# Patient Record
Sex: FEMALE | Race: OTHER | HISPANIC OR LATINO | URBAN - METROPOLITAN AREA
[De-identification: names, ages, dates, MRNs, and addresses within clinical notes are randomized per-mention and may not be internally consistent; named-entity substitution may affect disease eponyms.]

---

## 2017-07-21 ENCOUNTER — EMERGENCY (EMERGENCY)
Facility: HOSPITAL | Age: 77
LOS: 0 days | Discharge: HOME | End: 2017-07-21
Admitting: INTERNAL MEDICINE

## 2017-07-21 DIAGNOSIS — N39.0 URINARY TRACT INFECTION, SITE NOT SPECIFIED: ICD-10-CM

## 2017-07-21 DIAGNOSIS — M81.0 AGE-RELATED OSTEOPOROSIS WITHOUT CURRENT PATHOLOGICAL FRACTURE: ICD-10-CM

## 2017-07-21 DIAGNOSIS — R11.0 NAUSEA: ICD-10-CM

## 2017-07-21 DIAGNOSIS — S72.009A FRACTURE OF UNSPECIFIED PART OF NECK OF UNSPECIFIED FEMUR, INITIAL ENCOUNTER FOR CLOSED FRACTURE: ICD-10-CM

## 2017-07-21 DIAGNOSIS — Z98.890 OTHER SPECIFIED POSTPROCEDURAL STATES: ICD-10-CM

## 2017-07-21 DIAGNOSIS — G20 PARKINSON'S DISEASE: ICD-10-CM

## 2017-07-21 DIAGNOSIS — J45.909 UNSPECIFIED ASTHMA, UNCOMPLICATED: ICD-10-CM

## 2017-07-21 DIAGNOSIS — H72.92 UNSPECIFIED PERFORATION OF TYMPANIC MEMBRANE, LEFT EAR: ICD-10-CM

## 2017-07-21 DIAGNOSIS — E78.5 HYPERLIPIDEMIA, UNSPECIFIED: ICD-10-CM

## 2017-07-21 DIAGNOSIS — R42 DIZZINESS AND GIDDINESS: ICD-10-CM

## 2017-07-21 DIAGNOSIS — E03.9 HYPOTHYROIDISM, UNSPECIFIED: ICD-10-CM

## 2017-07-21 DIAGNOSIS — M47.816 SPONDYLOSIS WITHOUT MYELOPATHY OR RADICULOPATHY, LUMBAR REGION: ICD-10-CM

## 2017-07-21 DIAGNOSIS — K21.9 GASTRO-ESOPHAGEAL REFLUX DISEASE WITHOUT ESOPHAGITIS: ICD-10-CM

## 2017-12-14 ENCOUNTER — INPATIENT (INPATIENT)
Facility: HOSPITAL | Age: 77
LOS: 6 days | Discharge: NURSING, REHAB OR LONG-TERM CARE | End: 2017-12-21
Attending: HOSPITALIST | Admitting: INTERNAL MEDICINE

## 2017-12-14 DIAGNOSIS — M47.816 SPONDYLOSIS WITHOUT MYELOPATHY OR RADICULOPATHY, LUMBAR REGION: ICD-10-CM

## 2017-12-14 DIAGNOSIS — G20 PARKINSON'S DISEASE: ICD-10-CM

## 2017-12-14 DIAGNOSIS — E03.9 HYPOTHYROIDISM, UNSPECIFIED: ICD-10-CM

## 2017-12-14 DIAGNOSIS — S72.009A FRACTURE OF UNSPECIFIED PART OF NECK OF UNSPECIFIED FEMUR, INITIAL ENCOUNTER FOR CLOSED FRACTURE: ICD-10-CM

## 2017-12-14 DIAGNOSIS — R11.0 NAUSEA: ICD-10-CM

## 2017-12-14 DIAGNOSIS — M81.0 AGE-RELATED OSTEOPOROSIS WITHOUT CURRENT PATHOLOGICAL FRACTURE: ICD-10-CM

## 2017-12-22 ENCOUNTER — OUTPATIENT (OUTPATIENT)
Dept: OUTPATIENT SERVICES | Facility: HOSPITAL | Age: 77
LOS: 1 days | Discharge: HOME | End: 2017-12-22

## 2017-12-22 DIAGNOSIS — Z00.00 ENCOUNTER FOR GENERAL ADULT MEDICAL EXAMINATION WITHOUT ABNORMAL FINDINGS: ICD-10-CM

## 2017-12-22 DIAGNOSIS — G20 PARKINSON'S DISEASE: ICD-10-CM

## 2017-12-22 DIAGNOSIS — R11.0 NAUSEA: ICD-10-CM

## 2017-12-22 DIAGNOSIS — S72.009A FRACTURE OF UNSPECIFIED PART OF NECK OF UNSPECIFIED FEMUR, INITIAL ENCOUNTER FOR CLOSED FRACTURE: ICD-10-CM

## 2017-12-22 DIAGNOSIS — M81.0 AGE-RELATED OSTEOPOROSIS WITHOUT CURRENT PATHOLOGICAL FRACTURE: ICD-10-CM

## 2017-12-22 DIAGNOSIS — M47.816 SPONDYLOSIS WITHOUT MYELOPATHY OR RADICULOPATHY, LUMBAR REGION: ICD-10-CM

## 2017-12-22 DIAGNOSIS — E03.9 HYPOTHYROIDISM, UNSPECIFIED: ICD-10-CM

## 2017-12-26 DIAGNOSIS — S72.001A FRACTURE OF UNSPECIFIED PART OF NECK OF RIGHT FEMUR, INITIAL ENCOUNTER FOR CLOSED FRACTURE: ICD-10-CM

## 2017-12-26 DIAGNOSIS — I21.A1 MYOCARDIAL INFARCTION TYPE 2: ICD-10-CM

## 2017-12-26 DIAGNOSIS — M62.82 RHABDOMYOLYSIS: ICD-10-CM
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
## 2017-12-26 DIAGNOSIS — R26.9 UNSPECIFIED ABNORMALITIES OF GAIT AND MOBILITY: ICD-10-CM

## 2017-12-26 DIAGNOSIS — G20 PARKINSON'S DISEASE: ICD-10-CM

## 2017-12-26 DIAGNOSIS — D62 ACUTE POSTHEMORRHAGIC ANEMIA: ICD-10-CM
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
## 2017-12-26 DIAGNOSIS — K59.03 DRUG INDUCED CONSTIPATION: ICD-10-CM

## 2017-12-26 DIAGNOSIS — E03.9 HYPOTHYROIDISM, UNSPECIFIED: ICD-10-CM

## 2017-12-26 DIAGNOSIS — T40.605A ADVERSE EFFECT OF UNSPECIFIED NARCOTICS, INITIAL ENCOUNTER: ICD-10-CM
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
## 2018-05-05 ENCOUNTER — OUTPATIENT (OUTPATIENT)
Dept: OUTPATIENT SERVICES | Facility: HOSPITAL | Age: 78
LOS: 1 days | Discharge: HOME | End: 2018-05-05

## 2018-05-05 DIAGNOSIS — S72.031A DISPLACED MIDCERVICAL FRACTURE OF RIGHT FEMUR, INITIAL ENCOUNTER FOR CLOSED FRACTURE: ICD-10-CM

## 2018-07-09 ENCOUNTER — OUTPATIENT (OUTPATIENT)
Dept: OUTPATIENT SERVICES | Facility: HOSPITAL | Age: 78
LOS: 1 days | Discharge: HOME | End: 2018-07-09

## 2018-07-09 DIAGNOSIS — M54.5 LOW BACK PAIN: ICD-10-CM

## 2018-07-13 ENCOUNTER — OUTPATIENT (OUTPATIENT)
Dept: OUTPATIENT SERVICES | Facility: HOSPITAL | Age: 78
LOS: 1 days | Discharge: HOME | End: 2018-07-13

## 2018-07-16 DIAGNOSIS — Z78.0 ASYMPTOMATIC MENOPAUSAL STATE: ICD-10-CM

## 2018-07-16 DIAGNOSIS — Z13.820 ENCOUNTER FOR SCREENING FOR OSTEOPOROSIS: ICD-10-CM

## 2018-07-16 DIAGNOSIS — M81.0 AGE-RELATED OSTEOPOROSIS WITHOUT CURRENT PATHOLOGICAL FRACTURE: ICD-10-CM

## 2018-07-16 DIAGNOSIS — Z87.310 PERSONAL HISTORY OF (HEALED) OSTEOPOROSIS FRACTURE: ICD-10-CM

## 2019-01-25 PROBLEM — Z00.00 ENCOUNTER FOR PREVENTIVE HEALTH EXAMINATION: Status: ACTIVE | Noted: 2019-01-25

## 2019-01-28 ENCOUNTER — RX RENEWAL (OUTPATIENT)
Age: 79
End: 2019-01-28

## 2019-03-09 ENCOUNTER — RECORD ABSTRACTING (OUTPATIENT)
Age: 79
End: 2019-03-09

## 2019-03-09 DIAGNOSIS — J45.909 UNSPECIFIED ASTHMA, UNCOMPLICATED: ICD-10-CM

## 2019-03-09 DIAGNOSIS — Z85.828 PERSONAL HISTORY OF OTHER MALIGNANT NEOPLASM OF SKIN: ICD-10-CM

## 2019-03-12 ENCOUNTER — APPOINTMENT (OUTPATIENT)
Dept: CARDIOLOGY | Facility: CLINIC | Age: 79
End: 2019-03-12

## 2019-03-12 ENCOUNTER — APPOINTMENT (OUTPATIENT)
Dept: CARDIOLOGY | Facility: CLINIC | Age: 79
End: 2019-03-12
Payer: MEDICARE

## 2019-03-12 VITALS
HEIGHT: 63 IN | DIASTOLIC BLOOD PRESSURE: 70 MMHG | WEIGHT: 115 LBS | BODY MASS INDEX: 20.38 KG/M2 | SYSTOLIC BLOOD PRESSURE: 125 MMHG

## 2019-03-12 PROCEDURE — 99213 OFFICE O/P EST LOW 20 MIN: CPT

## 2019-03-12 RX ORDER — FLUTICASONE PROPIONATE AND SALMETEROL 50; 250 UG/1; UG/1
250-50 POWDER RESPIRATORY (INHALATION)
Refills: 0 | Status: DISCONTINUED | COMMUNITY
End: 2019-03-12

## 2019-03-12 RX ORDER — GABAPENTIN 100 MG/1
100 CAPSULE ORAL
Refills: 0 | Status: DISCONTINUED | COMMUNITY
End: 2019-03-12

## 2019-03-12 NOTE — HISTORY OF PRESENT ILLNESS
[FreeTextEntry1] : pt is feeling better \par  ghazala sx are stable\par  no chest pain\par  no sdyspnoea\par  echo done in 12/18 showed lvef 40% and mod as asymptomatic

## 2019-03-12 NOTE — PHYSICAL EXAM
[General Appearance - Well Developed] : well developed [Normal Appearance] : normal appearance [Well Groomed] : well groomed [General Appearance - Well Nourished] : well nourished [No Deformities] : no deformities [General Appearance - In No Acute Distress] : no acute distress [Heart Rate And Rhythm] : heart rate and rhythm were normal [Heart Sounds] : normal S1 and S2 [Arterial Pulses Normal] : the arterial pulses were normal [Edema] : no peripheral edema present [Veins - Varicosity Changes] : no varicosital changes were noted in the lower extremities [FreeTextEntry1] : syst m as

## 2019-03-12 NOTE — ASSESSMENT
[FreeTextEntry1] : moderate as asymptomatic\par  no chf \par  parkisonism sx are stable \par  htn is well controlled\par  meds reviewed

## 2019-05-07 ENCOUNTER — APPOINTMENT (OUTPATIENT)
Dept: NEUROLOGY | Facility: CLINIC | Age: 79
End: 2019-05-07

## 2019-06-04 ENCOUNTER — MEDICATION RENEWAL (OUTPATIENT)
Age: 79
End: 2019-06-04

## 2019-08-13 ENCOUNTER — APPOINTMENT (OUTPATIENT)
Dept: NEUROLOGY | Facility: CLINIC | Age: 79
End: 2019-08-13
Payer: MEDICARE

## 2019-08-13 VITALS
BODY MASS INDEX: 20.2 KG/M2 | WEIGHT: 114 LBS | HEIGHT: 63 IN | OXYGEN SATURATION: 99 % | DIASTOLIC BLOOD PRESSURE: 70 MMHG | HEART RATE: 75 BPM | TEMPERATURE: 92.6 F | SYSTOLIC BLOOD PRESSURE: 120 MMHG

## 2019-08-13 PROCEDURE — 99214 OFFICE O/P EST MOD 30 MIN: CPT

## 2019-08-13 RX ORDER — ESCITALOPRAM OXALATE 20 MG/1
20 TABLET ORAL DAILY
Qty: 90 | Refills: 2 | Status: DISCONTINUED | COMMUNITY
Start: 2019-03-12 | End: 2019-08-13

## 2019-08-13 RX ORDER — ESCITALOPRAM OXALATE 20 MG/1
20 TABLET ORAL DAILY
Qty: 90 | Refills: 0 | Status: DISCONTINUED | COMMUNITY
Start: 2019-01-28 | End: 2019-08-13

## 2019-08-15 NOTE — PHYSICAL EXAM
[FreeTextEntry1] : She is happy in a good mood with the full facial expression she is not hypophonic and does not have tremor. She does have a slight increased tone mostly to the right side and then she walks her right foot turns in slightly. She is able to stand up from sitting position without using her hand. There is no dysmetria no drift and her gait is stable Romberg is negative.\par As she talks she uses accessory muscles significantly ivy if she is short of breath with long sentences.

## 2019-08-15 NOTE — HISTORY OF PRESENT ILLNESS
[FreeTextEntry1] : Janine is here for followup. She appears to be happy and content with her current status\par She now lives with her daughter and as it appears they are helping her a lot. She did not have a fall she is quite functional and her mood is good.\par She says his gait pattern is also good there memory overall is good she does not have any issues with swallowing. There is no dizzy or light headed spells.\par She completely takes care of herself. She does have a cane but is able to walk without using it.\par She denies having headache.\par She has had regular followup with her primary physician. She says she is a slightly short of breath and range of motion of the neck is also quite limited there is no significant radiating pain to the legs

## 2019-08-15 NOTE — ASSESSMENT
[FreeTextEntry1] : Janine is here for followup she is doing significantly better with a good mood and she is quite functional. We will continue her medications and I will see her in followup in 6 months

## 2019-09-03 ENCOUNTER — APPOINTMENT (OUTPATIENT)
Dept: CARDIOLOGY | Facility: CLINIC | Age: 79
End: 2019-09-03
Payer: MEDICARE

## 2019-09-03 VITALS
DIASTOLIC BLOOD PRESSURE: 70 MMHG | SYSTOLIC BLOOD PRESSURE: 115 MMHG | BODY MASS INDEX: 20.02 KG/M2 | WEIGHT: 113 LBS | HEIGHT: 63 IN

## 2019-09-03 PROCEDURE — 93000 ELECTROCARDIOGRAM COMPLETE: CPT

## 2019-09-03 PROCEDURE — 99213 OFFICE O/P EST LOW 20 MIN: CPT

## 2019-09-03 RX ORDER — NIFEDIPINE 30 MG/1
30 TABLET, FILM COATED, EXTENDED RELEASE ORAL
Refills: 0 | Status: DISCONTINUED | COMMUNITY
End: 2019-09-03

## 2019-09-03 NOTE — HISTORY OF PRESENT ILLNESS
[FreeTextEntry1] : pt is feeling well\par  no chest pains\par  meds reviewed\par  parkinson sx are stable

## 2019-09-19 ENCOUNTER — RX RENEWAL (OUTPATIENT)
Age: 79
End: 2019-09-19

## 2019-10-03 ENCOUNTER — MEDICATION RENEWAL (OUTPATIENT)
Age: 79
End: 2019-10-03

## 2019-10-07 ENCOUNTER — INPATIENT (INPATIENT)
Facility: HOSPITAL | Age: 79
LOS: 6 days | Discharge: NURSING, REHAB OR LONG-TERM CARE | End: 2019-10-14
Attending: INTERNAL MEDICINE | Admitting: INTERNAL MEDICINE
Payer: MEDICARE

## 2019-10-07 VITALS
SYSTOLIC BLOOD PRESSURE: 161 MMHG | WEIGHT: 160.06 LBS | HEART RATE: 100 BPM | TEMPERATURE: 96 F | RESPIRATION RATE: 18 BRPM | OXYGEN SATURATION: 98 % | DIASTOLIC BLOOD PRESSURE: 83 MMHG

## 2019-10-07 LAB
ALBUMIN SERPL ELPH-MCNC: 4.7 G/DL — SIGNIFICANT CHANGE UP (ref 3.5–5.2)
ALP SERPL-CCNC: 72 U/L — SIGNIFICANT CHANGE UP (ref 30–115)
ALT FLD-CCNC: 10 U/L — SIGNIFICANT CHANGE UP (ref 0–41)
ANION GAP SERPL CALC-SCNC: 11 MMOL/L — SIGNIFICANT CHANGE UP (ref 7–14)
ANION GAP SERPL CALC-SCNC: 14 MMOL/L — SIGNIFICANT CHANGE UP (ref 7–14)
APPEARANCE UR: CLEAR — SIGNIFICANT CHANGE UP
AST SERPL-CCNC: 19 U/L — SIGNIFICANT CHANGE UP (ref 0–41)
BACTERIA # UR AUTO: ABNORMAL
BASOPHILS # BLD AUTO: 0.08 K/UL — SIGNIFICANT CHANGE UP (ref 0–0.2)
BASOPHILS NFR BLD AUTO: 1 % — SIGNIFICANT CHANGE UP (ref 0–1)
BILIRUB SERPL-MCNC: 0.3 MG/DL — SIGNIFICANT CHANGE UP (ref 0.2–1.2)
BILIRUB UR-MCNC: NEGATIVE — SIGNIFICANT CHANGE UP
BUN SERPL-MCNC: 19 MG/DL — SIGNIFICANT CHANGE UP (ref 10–20)
BUN SERPL-MCNC: 21 MG/DL — HIGH (ref 10–20)
CALCIUM SERPL-MCNC: 8.8 MG/DL — SIGNIFICANT CHANGE UP (ref 8.5–10.1)
CALCIUM SERPL-MCNC: 9.7 MG/DL — SIGNIFICANT CHANGE UP (ref 8.5–10.1)
CHLORIDE SERPL-SCNC: 101 MMOL/L — SIGNIFICANT CHANGE UP (ref 98–110)
CHLORIDE SERPL-SCNC: 103 MMOL/L — SIGNIFICANT CHANGE UP (ref 98–110)
CO2 SERPL-SCNC: 26 MMOL/L — SIGNIFICANT CHANGE UP (ref 17–32)
CO2 SERPL-SCNC: 26 MMOL/L — SIGNIFICANT CHANGE UP (ref 17–32)
COLOR SPEC: SIGNIFICANT CHANGE UP
CREAT SERPL-MCNC: 0.9 MG/DL — SIGNIFICANT CHANGE UP (ref 0.7–1.5)
CREAT SERPL-MCNC: 1.3 MG/DL — SIGNIFICANT CHANGE UP (ref 0.7–1.5)
DIFF PNL FLD: ABNORMAL
EOSINOPHIL # BLD AUTO: 0.21 K/UL — SIGNIFICANT CHANGE UP (ref 0–0.7)
EOSINOPHIL NFR BLD AUTO: 2.5 % — SIGNIFICANT CHANGE UP (ref 0–8)
EPI CELLS # UR: 2 /HPF — SIGNIFICANT CHANGE UP (ref 0–5)
GLUCOSE SERPL-MCNC: 101 MG/DL — HIGH (ref 70–99)
GLUCOSE SERPL-MCNC: 116 MG/DL — HIGH (ref 70–99)
GLUCOSE UR QL: NEGATIVE — SIGNIFICANT CHANGE UP
HCT VFR BLD CALC: 44.8 % — SIGNIFICANT CHANGE UP (ref 37–47)
HGB BLD-MCNC: 14.4 G/DL — SIGNIFICANT CHANGE UP (ref 12–16)
HYALINE CASTS # UR AUTO: 9 /LPF — HIGH (ref 0–7)
IMM GRANULOCYTES NFR BLD AUTO: 0.8 % — HIGH (ref 0.1–0.3)
KETONES UR-MCNC: SIGNIFICANT CHANGE UP
LEUKOCYTE ESTERASE UR-ACNC: NEGATIVE — SIGNIFICANT CHANGE UP
LYMPHOCYTES # BLD AUTO: 3.08 K/UL — SIGNIFICANT CHANGE UP (ref 1.2–3.4)
LYMPHOCYTES # BLD AUTO: 36.7 % — SIGNIFICANT CHANGE UP (ref 20.5–51.1)
MCHC RBC-ENTMCNC: 31 PG — SIGNIFICANT CHANGE UP (ref 27–31)
MCHC RBC-ENTMCNC: 32.1 G/DL — SIGNIFICANT CHANGE UP (ref 32–37)
MCV RBC AUTO: 96.3 FL — SIGNIFICANT CHANGE UP (ref 81–99)
MONOCYTES # BLD AUTO: 0.73 K/UL — HIGH (ref 0.1–0.6)
MONOCYTES NFR BLD AUTO: 8.7 % — SIGNIFICANT CHANGE UP (ref 1.7–9.3)
NEUTROPHILS # BLD AUTO: 4.23 K/UL — SIGNIFICANT CHANGE UP (ref 1.4–6.5)
NEUTROPHILS NFR BLD AUTO: 50.3 % — SIGNIFICANT CHANGE UP (ref 42.2–75.2)
NITRITE UR-MCNC: POSITIVE
NRBC # BLD: 0 /100 WBCS — SIGNIFICANT CHANGE UP (ref 0–0)
PH UR: 7.5 — SIGNIFICANT CHANGE UP (ref 5–8)
PLATELET # BLD AUTO: 204 K/UL — SIGNIFICANT CHANGE UP (ref 130–400)
POTASSIUM SERPL-MCNC: 4.4 MMOL/L — SIGNIFICANT CHANGE UP (ref 3.5–5)
POTASSIUM SERPL-MCNC: 4.4 MMOL/L — SIGNIFICANT CHANGE UP (ref 3.5–5)
POTASSIUM SERPL-SCNC: 4.4 MMOL/L — SIGNIFICANT CHANGE UP (ref 3.5–5)
POTASSIUM SERPL-SCNC: 4.4 MMOL/L — SIGNIFICANT CHANGE UP (ref 3.5–5)
PROT SERPL-MCNC: 7.1 G/DL — SIGNIFICANT CHANGE UP (ref 6–8)
PROT UR-MCNC: SIGNIFICANT CHANGE UP
RBC # BLD: 4.65 M/UL — SIGNIFICANT CHANGE UP (ref 4.2–5.4)
RBC # FLD: 12.8 % — SIGNIFICANT CHANGE UP (ref 11.5–14.5)
RBC CASTS # UR COMP ASSIST: 4 /HPF — SIGNIFICANT CHANGE UP (ref 0–4)
SODIUM SERPL-SCNC: 140 MMOL/L — SIGNIFICANT CHANGE UP (ref 135–146)
SODIUM SERPL-SCNC: 141 MMOL/L — SIGNIFICANT CHANGE UP (ref 135–146)
SP GR SPEC: >1.05 (ref 1.01–1.02)
TROPONIN T SERPL-MCNC: <0.01 NG/ML — SIGNIFICANT CHANGE UP
TROPONIN T SERPL-MCNC: <0.01 NG/ML — SIGNIFICANT CHANGE UP
UROBILINOGEN FLD QL: SIGNIFICANT CHANGE UP
WBC # BLD: 8.4 K/UL — SIGNIFICANT CHANGE UP (ref 4.8–10.8)
WBC # FLD AUTO: 8.4 K/UL — SIGNIFICANT CHANGE UP (ref 4.8–10.8)
WBC UR QL: 2 /HPF — SIGNIFICANT CHANGE UP (ref 0–5)

## 2019-10-07 PROCEDURE — 71260 CT THORAX DX C+: CPT | Mod: 26

## 2019-10-07 PROCEDURE — 71045 X-RAY EXAM CHEST 1 VIEW: CPT | Mod: 26

## 2019-10-07 PROCEDURE — 73080 X-RAY EXAM OF ELBOW: CPT | Mod: 26,LT

## 2019-10-07 PROCEDURE — 73060 X-RAY EXAM OF HUMERUS: CPT | Mod: 26,LT

## 2019-10-07 PROCEDURE — 72125 CT NECK SPINE W/O DYE: CPT | Mod: 26

## 2019-10-07 PROCEDURE — 74177 CT ABD & PELVIS W/CONTRAST: CPT | Mod: 26

## 2019-10-07 PROCEDURE — 72170 X-RAY EXAM OF PELVIS: CPT | Mod: 26

## 2019-10-07 PROCEDURE — 99223 1ST HOSP IP/OBS HIGH 75: CPT

## 2019-10-07 PROCEDURE — 99291 CRITICAL CARE FIRST HOUR: CPT

## 2019-10-07 PROCEDURE — 70450 CT HEAD/BRAIN W/O DYE: CPT | Mod: 26

## 2019-10-07 PROCEDURE — 73030 X-RAY EXAM OF SHOULDER: CPT | Mod: 26,LT

## 2019-10-07 PROCEDURE — 73000 X-RAY EXAM OF COLLAR BONE: CPT | Mod: 26,LT

## 2019-10-07 RX ORDER — CARBIDOPA AND LEVODOPA 25; 100 MG/1; MG/1
1 TABLET ORAL
Refills: 0 | Status: DISCONTINUED | OUTPATIENT
Start: 2019-10-07 | End: 2019-10-14

## 2019-10-07 RX ORDER — ACETAMINOPHEN 500 MG
650 TABLET ORAL EVERY 6 HOURS
Refills: 0 | Status: COMPLETED | OUTPATIENT
Start: 2019-10-07 | End: 2019-10-10

## 2019-10-07 RX ORDER — DONEPEZIL HYDROCHLORIDE 10 MG/1
10 TABLET, FILM COATED ORAL AT BEDTIME
Refills: 0 | Status: DISCONTINUED | OUTPATIENT
Start: 2019-10-07 | End: 2019-10-07

## 2019-10-07 RX ORDER — LEVOTHYROXINE SODIUM 125 MCG
88 TABLET ORAL DAILY
Refills: 0 | Status: DISCONTINUED | OUTPATIENT
Start: 2019-10-07 | End: 2019-10-14

## 2019-10-07 RX ORDER — SENNA PLUS 8.6 MG/1
2 TABLET ORAL AT BEDTIME
Refills: 0 | Status: DISCONTINUED | OUTPATIENT
Start: 2019-10-07 | End: 2019-10-14

## 2019-10-07 RX ORDER — CHLORHEXIDINE GLUCONATE 213 G/1000ML
1 SOLUTION TOPICAL EVERY 12 HOURS
Refills: 0 | Status: COMPLETED | OUTPATIENT
Start: 2019-10-07 | End: 2019-10-08

## 2019-10-07 RX ORDER — IBUPROFEN 200 MG
600 TABLET ORAL EVERY 6 HOURS
Refills: 0 | Status: DISCONTINUED | OUTPATIENT
Start: 2019-10-07 | End: 2019-10-07

## 2019-10-07 RX ORDER — KETOROLAC TROMETHAMINE 30 MG/ML
15 SYRINGE (ML) INJECTION ONCE
Refills: 0 | Status: DISCONTINUED | OUTPATIENT
Start: 2019-10-07 | End: 2019-10-07

## 2019-10-07 RX ORDER — TETANUS TOXOID, REDUCED DIPHTHERIA TOXOID AND ACELLULAR PERTUSSIS VACCINE, ADSORBED 5; 2.5; 8; 8; 2.5 [IU]/.5ML; [IU]/.5ML; UG/.5ML; UG/.5ML; UG/.5ML
0.5 SUSPENSION INTRAMUSCULAR ONCE
Refills: 0 | Status: COMPLETED | OUTPATIENT
Start: 2019-10-07 | End: 2019-10-07

## 2019-10-07 RX ORDER — MORPHINE SULFATE 50 MG/1
2 CAPSULE, EXTENDED RELEASE ORAL ONCE
Refills: 0 | Status: DISCONTINUED | OUTPATIENT
Start: 2019-10-07 | End: 2019-10-07

## 2019-10-07 RX ORDER — SIMVASTATIN 20 MG/1
20 TABLET, FILM COATED ORAL AT BEDTIME
Refills: 0 | Status: DISCONTINUED | OUTPATIENT
Start: 2019-10-07 | End: 2019-10-07

## 2019-10-07 RX ORDER — ESCITALOPRAM OXALATE 10 MG/1
20 TABLET, FILM COATED ORAL DAILY
Refills: 0 | Status: DISCONTINUED | OUTPATIENT
Start: 2019-10-07 | End: 2019-10-14

## 2019-10-07 RX ORDER — PANTOPRAZOLE SODIUM 20 MG/1
40 TABLET, DELAYED RELEASE ORAL
Refills: 0 | Status: DISCONTINUED | OUTPATIENT
Start: 2019-10-07 | End: 2019-10-14

## 2019-10-07 RX ORDER — DONEPEZIL HYDROCHLORIDE 10 MG/1
10 TABLET, FILM COATED ORAL AT BEDTIME
Refills: 0 | Status: DISCONTINUED | OUTPATIENT
Start: 2019-10-07 | End: 2019-10-14

## 2019-10-07 RX ORDER — CARBIDOPA AND LEVODOPA 25; 100 MG/1; MG/1
1 TABLET ORAL THREE TIMES A DAY
Refills: 0 | Status: DISCONTINUED | OUTPATIENT
Start: 2019-10-07 | End: 2019-10-07

## 2019-10-07 RX ORDER — SIMVASTATIN 20 MG/1
20 TABLET, FILM COATED ORAL AT BEDTIME
Refills: 0 | Status: DISCONTINUED | OUTPATIENT
Start: 2019-10-07 | End: 2019-10-14

## 2019-10-07 RX ORDER — DOCUSATE SODIUM 100 MG
100 CAPSULE ORAL DAILY
Refills: 0 | Status: DISCONTINUED | OUTPATIENT
Start: 2019-10-07 | End: 2019-10-14

## 2019-10-07 RX ORDER — SODIUM CHLORIDE 9 MG/ML
1000 INJECTION, SOLUTION INTRAVENOUS
Refills: 0 | Status: DISCONTINUED | OUTPATIENT
Start: 2019-10-07 | End: 2019-10-08

## 2019-10-07 RX ORDER — OXYCODONE HYDROCHLORIDE 5 MG/1
5 TABLET ORAL EVERY 6 HOURS
Refills: 0 | Status: DISCONTINUED | OUTPATIENT
Start: 2019-10-07 | End: 2019-10-14

## 2019-10-07 RX ORDER — CEFTRIAXONE 500 MG/1
1000 INJECTION, POWDER, FOR SOLUTION INTRAMUSCULAR; INTRAVENOUS ONCE
Refills: 0 | Status: COMPLETED | OUTPATIENT
Start: 2019-10-07 | End: 2019-10-07

## 2019-10-07 RX ORDER — INFLUENZA VIRUS VACCINE 15; 15; 15; 15 UG/.5ML; UG/.5ML; UG/.5ML; UG/.5ML
0.5 SUSPENSION INTRAMUSCULAR ONCE
Refills: 0 | Status: COMPLETED | OUTPATIENT
Start: 2019-10-07 | End: 2019-10-12

## 2019-10-07 RX ORDER — ACETAMINOPHEN 500 MG
650 TABLET ORAL EVERY 6 HOURS
Refills: 0 | Status: DISCONTINUED | OUTPATIENT
Start: 2019-10-07 | End: 2019-10-07

## 2019-10-07 RX ORDER — HEPARIN SODIUM 5000 [USP'U]/ML
5000 INJECTION INTRAVENOUS; SUBCUTANEOUS EVERY 8 HOURS
Refills: 0 | Status: DISCONTINUED | OUTPATIENT
Start: 2019-10-07 | End: 2019-10-14

## 2019-10-07 RX ORDER — TETANUS TOXOID, REDUCED DIPHTHERIA TOXOID AND ACELLULAR PERTUSSIS VACCINE, ADSORBED 5; 2.5; 8; 8; 2.5 [IU]/.5ML; [IU]/.5ML; UG/.5ML; UG/.5ML; UG/.5ML
0.5 SUSPENSION INTRAMUSCULAR ONCE
Refills: 0 | Status: DISCONTINUED | OUTPATIENT
Start: 2019-10-07 | End: 2019-10-07

## 2019-10-07 RX ADMIN — Medication 650 MILLIGRAM(S): at 12:50

## 2019-10-07 RX ADMIN — CARBIDOPA AND LEVODOPA 1 TABLET(S): 25; 100 TABLET ORAL at 13:50

## 2019-10-07 RX ADMIN — MORPHINE SULFATE 2 MILLIGRAM(S): 50 CAPSULE, EXTENDED RELEASE ORAL at 01:55

## 2019-10-07 RX ADMIN — SENNA PLUS 2 TABLET(S): 8.6 TABLET ORAL at 21:38

## 2019-10-07 RX ADMIN — Medication 15 MILLIGRAM(S): at 03:29

## 2019-10-07 RX ADMIN — Medication 650 MILLIGRAM(S): at 17:21

## 2019-10-07 RX ADMIN — Medication 88 MICROGRAM(S): at 06:26

## 2019-10-07 RX ADMIN — DONEPEZIL HYDROCHLORIDE 10 MILLIGRAM(S): 10 TABLET, FILM COATED ORAL at 21:38

## 2019-10-07 RX ADMIN — OXYCODONE HYDROCHLORIDE 5 MILLIGRAM(S): 5 TABLET ORAL at 21:37

## 2019-10-07 RX ADMIN — OXYCODONE HYDROCHLORIDE 5 MILLIGRAM(S): 5 TABLET ORAL at 22:00

## 2019-10-07 RX ADMIN — MORPHINE SULFATE 2 MILLIGRAM(S): 50 CAPSULE, EXTENDED RELEASE ORAL at 04:29

## 2019-10-07 RX ADMIN — Medication 650 MILLIGRAM(S): at 07:02

## 2019-10-07 RX ADMIN — MORPHINE SULFATE 2 MILLIGRAM(S): 50 CAPSULE, EXTENDED RELEASE ORAL at 01:15

## 2019-10-07 RX ADMIN — HEPARIN SODIUM 5000 UNIT(S): 5000 INJECTION INTRAVENOUS; SUBCUTANEOUS at 06:26

## 2019-10-07 RX ADMIN — MORPHINE SULFATE 2 MILLIGRAM(S): 50 CAPSULE, EXTENDED RELEASE ORAL at 01:20

## 2019-10-07 RX ADMIN — CARBIDOPA AND LEVODOPA 1 TABLET(S): 25; 100 TABLET ORAL at 21:38

## 2019-10-07 RX ADMIN — SIMVASTATIN 20 MILLIGRAM(S): 20 TABLET, FILM COATED ORAL at 21:38

## 2019-10-07 RX ADMIN — CARBIDOPA AND LEVODOPA 1 TABLET(S): 25; 100 TABLET ORAL at 17:22

## 2019-10-07 RX ADMIN — MORPHINE SULFATE 2 MILLIGRAM(S): 50 CAPSULE, EXTENDED RELEASE ORAL at 01:04

## 2019-10-07 RX ADMIN — MORPHINE SULFATE 2 MILLIGRAM(S): 50 CAPSULE, EXTENDED RELEASE ORAL at 01:47

## 2019-10-07 RX ADMIN — CEFTRIAXONE 100 MILLIGRAM(S): 500 INJECTION, POWDER, FOR SOLUTION INTRAMUSCULAR; INTRAVENOUS at 04:37

## 2019-10-07 RX ADMIN — CEFTRIAXONE 1000 MILLIGRAM(S): 500 INJECTION, POWDER, FOR SOLUTION INTRAMUSCULAR; INTRAVENOUS at 05:09

## 2019-10-07 RX ADMIN — Medication 600 MILLIGRAM(S): at 07:02

## 2019-10-07 RX ADMIN — Medication 100 MILLIGRAM(S): at 12:20

## 2019-10-07 RX ADMIN — TETANUS TOXOID, REDUCED DIPHTHERIA TOXOID AND ACELLULAR PERTUSSIS VACCINE, ADSORBED 0.5 MILLILITER(S): 5; 2.5; 8; 8; 2.5 SUSPENSION INTRAMUSCULAR at 01:19

## 2019-10-07 RX ADMIN — SODIUM CHLORIDE 90 MILLILITER(S): 9 INJECTION, SOLUTION INTRAVENOUS at 14:01

## 2019-10-07 RX ADMIN — Medication 650 MILLIGRAM(S): at 12:20

## 2019-10-07 RX ADMIN — HEPARIN SODIUM 5000 UNIT(S): 5000 INJECTION INTRAVENOUS; SUBCUTANEOUS at 14:01

## 2019-10-07 RX ADMIN — HEPARIN SODIUM 5000 UNIT(S): 5000 INJECTION INTRAVENOUS; SUBCUTANEOUS at 21:38

## 2019-10-07 RX ADMIN — Medication 650 MILLIGRAM(S): at 17:53

## 2019-10-07 RX ADMIN — MORPHINE SULFATE 2 MILLIGRAM(S): 50 CAPSULE, EXTENDED RELEASE ORAL at 01:00

## 2019-10-07 RX ADMIN — SODIUM CHLORIDE 90 MILLILITER(S): 9 INJECTION, SOLUTION INTRAVENOUS at 06:26

## 2019-10-07 RX ADMIN — Medication 15 MILLIGRAM(S): at 03:24

## 2019-10-07 RX ADMIN — MORPHINE SULFATE 2 MILLIGRAM(S): 50 CAPSULE, EXTENDED RELEASE ORAL at 04:01

## 2019-10-07 RX ADMIN — Medication 600 MILLIGRAM(S): at 06:25

## 2019-10-07 RX ADMIN — Medication 650 MILLIGRAM(S): at 06:26

## 2019-10-07 RX ADMIN — CARBIDOPA AND LEVODOPA 1 TABLET(S): 25; 100 TABLET ORAL at 06:47

## 2019-10-07 RX ADMIN — PANTOPRAZOLE SODIUM 40 MILLIGRAM(S): 20 TABLET, DELAYED RELEASE ORAL at 06:26

## 2019-10-07 RX ADMIN — ESCITALOPRAM OXALATE 20 MILLIGRAM(S): 10 TABLET, FILM COATED ORAL at 12:20

## 2019-10-07 NOTE — H&P ADULT - NSHPPHYSICALEXAM_GEN_ALL_CORE
PHYSICAL EXAM:  GENERAL: A&O, NAD, GCS 15  HEENT: Normocephalic, atraumatic  BACK: No stepoffs, No tenderness   CHEST/LUNG: Bilateral breath sounds  HEART: Regular rate and rhythm  ABDOMEN: Soft, Nondistended, Nontender, Pelvis stable  EXTREMITIES:  Moving all extremities, pulses throughout, + obvious deformity over L clavicle area with associated tenderness.   RECTAL: not performed

## 2019-10-07 NOTE — ED PROVIDER NOTE - NS ED ROS FT
Constitutional: See HPI.  Eyes: No visual changes, eye pain or discharge.  ENMT: No hearing changes, pain, discharge or infections. No neck pain or stiffness.  Cardiac: No chest pain, SOB or edema. No chest pain with exertion.  Respiratory: No cough or respiratory distress.   GI: No nausea, vomiting, diarrhea or abdominal pain.  : No dysuria, frequency or burning.  MS: +left shoulder pain; No myalgia, muscle weakness, joint pain or back pain.  Neuro: No headache or weakness. No LOC.  Skin: No skin rash.  Endo: No hx of DM, thyroid disease  Except as documented in HPI, all other review of systems is negative

## 2019-10-07 NOTE — ED ADULT NURSE NOTE - NSIMPLEMENTINTERV_GEN_ALL_ED
Implemented All Fall with Harm Risk Interventions:  [place] to call system. Call bell, personal items and telephone within reach. Instruct patient to call for assistance. Room bathroom lighting operational. Non-slip footwear when patient is off stretcher. Physically safe environment: no spills, clutter or unnecessary equipment. Stretcher in lowest position, wheels locked, appropriate side rails in place. Provide visual cue, wrist band, yellow gown, etc. Monitor gait and stability. Monitor for mental status changes and reorient to person, place, and time. Review medications for side effects contributing to fall risk. Reinforce activity limits and safety measures with patient and family. Provide visual clues: red socks.

## 2019-10-07 NOTE — H&P ADULT - NSHPLABSRESULTS_GEN_ALL_CORE
14.4   8.40  )-----------( 204      ( 10-07 @ 00:46 )             44.8                    141   |  101   |  19                 Ca: 9.7    BMP:   ----------------------------< 116    Mg: x     (10-07-19 @ 00:46)             4.4    |  26    | 1.3                Ph: x        LFT:     TPro: 7.1 / Alb: 4.7 / TBili: 0.3 / DBili: x / AST: 19 / ALT: 10 / AlkPhos: 72   (10-07-19 @ 00:46)              CARDIAC MARKERS ( 07 Oct 2019 00:46 )  x     / <0.01 ng/mL / x     / x     / x            Urinalysis Basic - ( 07 Oct 2019 03:50 )    Color: Light Yellow / Appearance: Clear / SG: >1.050 / pH: x  Gluc: x / Ketone: Trace  / Bili: Negative / Urobili: <2 mg/dL   Blood: x / Protein: Trace / Nitrite: Positive   Leuk Esterase: Negative / RBC: 4 /HPF / WBC 2 /HPF   Sq Epi: x / Non Sq Epi: 2 /HPF / Bacteria: Many        RADIOLOGY:   < from: CT Head No Cont (10.07.19 @ 02:04) >  IMPRESSION:  No CT evidence of acute intracranial pathology.      < from: CT Cervical Spine No Cont (10.07.19 @ 02:06) >  IMPRESSION:  No acute cervical spine fracture or subluxation.  Acute left clavicular fracture.See CT chest, abdomen and pelvis report   from the same day.  < end of copied text >      < from: CT Chest w/ IV Cont (10.07.19 @ 02:09) >  IMPRESSION:   Acute, displaced midshaft fracture of the left clavicle.  Ascending thoracic aorta aneurysm measuring 4.1 cm.  No CT evidence of acute traumatic intra-abdominal injury.  Additional Findings/Recommendations After Attending Radiologist Review:    Acute fractures of the left third, fourth, fifth, sixth, and seventh   ribs. The sixth rib is fractured in 2 places, flail segment. The third   rib fracture is displaced.  < end of copied text >      < from: CT Abdomen and Pelvis w/ IV Cont (10.07.19 @ 02:09) >  IMPRESSION:   Acute, displaced midshaft fracture of the left clavicle.    Ascending thoracic aorta aneurysm measuring 4.1 cm.    No CT evidence of acute traumatic intra-abdominal injury.    Additional Findings/Recommendations After Attending Radiologist Review:    Acute fractures of the left third, fourth, fifth, sixth, and seventh   ribs. The sixth rib is fractured in 2 places, flail segment. The third   rib fracture is displaced.  < end of copied text >      < from: Xray Pelvis AP only (10.07.19 @ 01:56) >  IMPRESSION:  No acute displaced fracture.  < end of copied text > 14.4   8.40  )-----------( 204      ( 10-07 @ 00:46 )             44.8                    141   |  101   |  19                 Ca: 9.7    BMP:   ----------------------------< 116    Mg: x     (10-07-19 @ 00:46)             4.4    |  26    | 1.3                Ph: x        LFT:     TPro: 7.1 / Alb: 4.7 / TBili: 0.3 / DBili: x / AST: 19 / ALT: 10 / AlkPhos: 72   (10-07-19 @ 00:46)    CARDIAC MARKERS ( 07 Oct 2019 00:46 )  x     / <0.01 ng/mL / x     / x     / x        Urinalysis Basic - ( 07 Oct 2019 03:50 )    Color: Light Yellow / Appearance: Clear / SG: >1.050 / pH: x  Gluc: x / Ketone: Trace  / Bili: Negative / Urobili: <2 mg/dL   Blood: x / Protein: Trace / Nitrite: Positive   Leuk Esterase: Negative / RBC: 4 /HPF / WBC 2 /HPF   Sq Epi: x / Non Sq Epi: 2 /HPF / Bacteria: Many    RADIOLOGY:   < from: CT Head No Cont (10.07.19 @ 02:04) >  IMPRESSION:  No CT evidence of acute intracranial pathology.    < from: CT Cervical Spine No Cont (10.07.19 @ 02:06) >  IMPRESSION:  No acute cervical spine fracture or subluxation.  Acute left clavicular fracture.See CT chest, abdomen and pelvis report   from the same day.  < end of copied text >    < from: CT Chest w/ IV Cont (10.07.19 @ 02:09) >  IMPRESSION:   Acute, displaced midshaft fracture of the left clavicle.  Ascending thoracic aorta aneurysm measuring 4.1 cm.  No CT evidence of acute traumatic intra-abdominal injury.  Additional Findings/Recommendations After Attending Radiologist Review:    Acute fractures of the left third, fourth, fifth, sixth, and seventh   ribs. The sixth rib is fractured in 2 places, flail segment. The third   rib fracture is displaced.  < end of copied text >    < from: CT Abdomen and Pelvis w/ IV Cont (10.07.19 @ 02:09) >  IMPRESSION:   Acute, displaced midshaft fracture of the left clavicle.  Ascending thoracic aorta aneurysm measuring 4.1 cm.  No CT evidence of acute traumatic intra-abdominal injury.  Additional Findings/Recommendations After Attending Radiologist Review:    Acute fractures of the left third, fourth, fifth, sixth, and seventh   ribs. The sixth rib is fractured in 2 places, flail segment. The third   rib fracture is displaced.  < end of copied text >    < from: Xray Pelvis AP only (10.07.19 @ 01:56) >  IMPRESSION:  No acute displaced fracture.  < end of copied text >

## 2019-10-07 NOTE — ED PROVIDER NOTE - CRITICAL CARE PROVIDED
consultation with other physicians/documentation/consult w/ pt's family directly relating to pts condition/additional history taking/interpretation of diagnostic studies

## 2019-10-07 NOTE — ED PROVIDER NOTE - CLINICAL SUMMARY MEDICAL DECISION MAKING FREE TEXT BOX
Pt s/p fall, now with multiple rib fractures and clavicle fracture. Will admit to SICU for pain control.

## 2019-10-07 NOTE — CONSULT NOTE ADULT - SUBJECTIVE AND OBJECTIVE BOX
SICU Consultation Note  =====================================================  HPI:    78 y/o F PMHx Parkinsons and Hypothyroidism presents to the ED s/p mechanical fall unwitnessed. As per family, they heard the patient fall at 11:30 PM and patient was found down lying on her side. +HT, ?LOC, -AC. Patient has had multiple falls as per family 2/2 to parkinsons. Patient does not recall how she fall. Patient also presented with L shoulder pain. PAN Scan performed all negative except L sided 3-7 rib fx . Sixth rib is broken in two places, flail segment. 3rd rib is displaced.  IS pulling >1500, satting well on NC. SICU consulted for respiratory monitoring and pain control. Patient hemodynamically stable.     CT Chest w/ IV Contrast   IMPRESSION:   Acute, displaced midshaft fracture of the left clavicle.  Ascending thoracic aorta aneurysm measuring 4.1 cm.  No CT evidence of acute traumatic intra-abdominal injury.  Additional Findings/Recommendations After Attending Radiologist Review:    Acute fractures of the left third, fourth, fifth, sixth, and seventh   ribs. The sixth rib is fractured in 2 places, flail segment. The third   rib fracture is displaced.        PAST MEDICAL & SURGICAL HISTORY:  Hypothyroid  Parkinsonian syndrome      Allergies  No Known Allergies    SH:  Home Medications:  CARB/LEVO    TAB 25-100MG:  (07 Oct 2019 04:45)  CARB/LEVO ER TAB 50-200MG:  (07 Oct 2019 04:45)  DONEPEZIL    TAB 10MG:  (07 Oct 2019 04:45)  ESCITALOPRAM TAB 20MG:  (07 Oct 2019 04:45)  LEVOTHYROXIN TAB 88MCG:  (07 Oct 2019 04:45)  SIMVASTATIN  TAB 20MG:  (07 Oct 2019 04:45)    Advanced Directives: Full Code     ROS:  [x ] A ten-point review of systems was otherwise negative except as noted.  [ ] Due to altered mental status/intubation, subjective information were not able to be obtained from the patient. History was obtained, to the extent possible, from review of the chart and collateral sources of information.      CURRENT MEDICATIONS:   --------------------------------------------------------------------------------------  Neurologic Medications  acetaminophen   Tablet .. 650 milliGRAM(s) Oral every 6 hours  carbidopa/levodopa  25/100 1 Tablet(s) Oral three times a day  carbidopa/levodopa CR 50/200 1 Tablet(s) Oral <User Schedule>  donepezil 10 milliGRAM(s) Oral at bedtime  escitalopram 20 milliGRAM(s) Oral daily  ibuprofen  Tablet. 600 milliGRAM(s) Oral every 6 hours    Gastrointestinal Medications  pantoprazole    Tablet 40 milliGRAM(s) Oral before breakfast    Hematologic/Oncologic Medications  heparin  Injectable 5000 Unit(s) SubCutaneous every 8 hours    Endocrine/Metabolic Medications  levothyroxine 88 MICROGram(s) Oral daily  simvastatin 20 milliGRAM(s) Oral at bedtime    Topical/Other Medications  chlorhexidine 4% Liquid 1 Application(s) Topical every 12 hours    --------------------------------------------------------------------------------------    Vital Signs Last 24 Hrs  T(F): 96.3  HR: 85  BP: 139/71   RR: 16   SpO2: 98%       I&O's Summary      LABS:                          14.4   8.40  )-----------( 204      ( 07 Oct 2019 00:46 )             44.8     10-07    141  |  101  |  19  ----------------------------<  116<H>  4.4   |  26  |  1.3    Ca    9.7      07 Oct 2019 00:46    TPro  7.1  /  Alb  4.7  /  TBili  0.3  /  DBili  x   /  AST  19  /  ALT  10  /  AlkPhos  72  10-07    LIVER FUNCTIONS - ( 07 Oct 2019 00:46 )  Alb: 4.7 g/dL / Pro: 7.1 g/dL / ALK PHOS: 72 U/L / ALT: 10 U/L / AST: 19 U/L / GGT: x             CARDIAC MARKERS ( 07 Oct 2019 00:46 )  x     / <0.01 ng/mL / x     / x     / x            EXAM:  General/Neuro: A&Ox3. Follows commands. No neuro focal deficits.     Respiratory  Exam: Lungs clear to auscultation, Normal expansion/effort. Pulling >1500 on IS  NC satting >98%. No ecchymosis noted. Tenderness to palpation at rib fx sites     Cardiovascular  Exam: S1, S2.  Regular rate and rhythm.   Cardiac Rhythm: Normal Sinus Rhythm      GI  Exam: Abdomen soft, Non-tender, Non-distended.  Current Diet: Regular diet     Extremities  Exam: Extremities warm, pink, well-perfused.      Derm:  Exam: Good skin turgor, no skin breakdown.      :   Exam: No torres     MSK: L clavicle has visible deformity w/ point tenderness.     Tubes/Lines/Drains  ***  [x] Peripheral IV

## 2019-10-07 NOTE — CONSULT NOTE ADULT - ASSESSMENT
IMPRESSION: Rehab of gait ab fall parkinsons with decline, multitrauma    PRECAUTIONS: [x    ] Cardiac  [  x  ] Respiratory  [    ] Seizures [    ] Contact Isolation  [    ] Droplet Isolation  [    ] Other    Weight Bearing Status: NWB LUE    RECOMMENDATION: INCENTIVE SPIROMETRY    Out of Bed to Chair     DVT/Decubiti Prophylaxis    REHAB PLAN:     [  x   ] Bedside P/T 3-5 times a week   [ x    ] Bedside O/T  2-3 times a week   [     ] No Rehab Therapy Indicated   [     ]  Speech Therapy   Conditioning/ROM                                 ADL  Bed Mobility                                            Conditioning/ROM  Transfers                                                  Bed Mobility  Sitting /Standing Balance                      Transfers                                        Gait Training                                            Sitting/Standing Balance  Stair Training [ x  ]Applicable                 Home equipment Eval                                                                     Splinting  [   ] Only      GOALS:   ADL   [ x   ]   Independent         Transfers  [x    ] Independent            Ambulation  [ x    ] Independent     [   x  ] With device                            [    ]  CG                                               [    ]  CG                                                    [     ] CG                            [    ] Min A                                          [    ] Min A                                                [     ] Min  A          DISCHARGE PLAN:   [     ]  Good candidate for Intensive Rehabilitation/Hospital based                                             Will tolerate 3hrs Intensive Rehab Daily                                       [    x  ]  Short Term Rehab in Skilled Nursing Facility                                 VS                                     [  x    ]  Home with Outpatient or VN services                                  VS                                     [   x   ]  Possible Candidate for Intensive Hospital based Rehab IMPRESSION: Rehab of gait ab fall parkinsons with decline, multitrauma    PRECAUTIONS: [x    ] Cardiac  [  x  ] Respiratory  [    ] Seizures [    ] Contact Isolation  [    ] Droplet Isolation  [    ] Other    Weight Bearing Status: NWB LUE    RECOMMENDATION: INCENTIVE SPIROMETRY    Out of Bed to Chair     DVT/Decubiti Prophylaxis    REHAB PLAN:     [  x   ] Bedside P/T 3-5 times a week   [ x    ] Bedside O/T  2-3 times a week   [     ] No Rehab Therapy Indicated   [     ]  Speech Therapy   Conditioning/ROM                                 ADL  Bed Mobility                                            Conditioning/ROM  Transfers                                                  Bed Mobility  Sitting /Standing Balance                      Transfers                                        Gait Training                                            Sitting/Standing Balance  Stair Training [ x  ]Applicable                 Home equipment Eval                                                                     Splinting  [   ] Only      GOALS:   ADL   [ x   ]   Independent         Transfers  [x    ] Independent            Ambulation  [ x    ] Independent     [   x  ] With device                            [    ]  CG                                               [    ]  CG                                                    [     ] CG                            [    ] Min A                                          [    ] Min A                                                [     ] Min  A          DISCHARGE PLAN:   [  x   ]  Good candidate for Intensive Rehabilitation/Hospital based                                             Will tolerate 3hrs Intensive Rehab Daily                                       [     ]  Short Term Rehab in Skilled Nursing Facility                                                                      [      ]  Home with Outpatient or  services                                                                       [      ]  Possible Candidate for Intensive Hospital based Rehab

## 2019-10-07 NOTE — ED ADULT NURSE NOTE - OBJECTIVE STATEMENT
Pt s/p unwitnessed fall, Pt cannot recall the fall. Has pain LUE (clavicle), redness on L head. No blood thinners. Abrasion on L elbow.

## 2019-10-07 NOTE — CONSULT NOTE ADULT - SUBJECTIVE AND OBJECTIVE BOX
Patient is a 79y old  Female who presents with a chief complaint of fall (07 Oct 2019 10:39)    HPI:  79F, PMHx Parkinsons, Hypothyroid, presents to ED s/p fall. As per family, they heard the pt fall around 1130pm, pt was found down lying onto her left side, +HT, ?LOC, -AC. Pt does not recall how she fell. Pt presents w/ Left shoulder pain. Otherwise no HA, no CP, no SOB, no abdominal pain, no dysuria. (07 Oct 2019 04:27)      PAST MEDICAL & SURGICAL HISTORY:  Hypothyroid  Parkinsonian syndrome      Hospital Course: trauma up-Acute, displaced midshaft fracture of the left clavicle. Acute fractures of the left third, fourth, fifth, sixth, and seventh   	ribs. The sixth rib is fractured in 2 places, flail segment. The third   	rib fracture is displaced.  Seen by ortho- NWB LUE- IN ICU    TODAY'S SUBJECTIVE & REVIEW OF SYMPTOMS:     Constitutional WNL   Cardio WNL   Resp WNL   GI WNL  Heme WNL  Endo WNL  Skin WNL  MSK WNL  Neuro WNL  Cognitive confusion  Psych WNL  + incontinence urine    MEDICATIONS  (STANDING):  acetaminophen   Tablet .. 650 milliGRAM(s) Oral every 6 hours  carbidopa/levodopa  25/100 1 Tablet(s) Oral three times a day  carbidopa/levodopa CR 50/200 1 Tablet(s) Oral <User Schedule>  chlorhexidine 4% Liquid 1 Application(s) Topical every 12 hours  docusate sodium 100 milliGRAM(s) Oral daily  donepezil 10 milliGRAM(s) Oral at bedtime  escitalopram 20 milliGRAM(s) Oral daily  heparin  Injectable 5000 Unit(s) SubCutaneous every 8 hours  influenza   Vaccine 0.5 milliLiter(s) IntraMuscular once  lactated ringers. 1000 milliLiter(s) (90 mL/Hr) IV Continuous <Continuous>  levothyroxine 88 MICROGram(s) Oral daily  pantoprazole    Tablet 40 milliGRAM(s) Oral before breakfast  senna 2 Tablet(s) Oral at bedtime  simvastatin 20 milliGRAM(s) Oral at bedtime    MEDICATIONS  (PRN):      FAMILY HISTORY:      Allergies    No Known Allergies    Intolerances        SOCIAL HISTORY:    [    ] Etoh  [    ] Smoking  [    ] Substance abuse     Home Environment:  [    ] Home Alone  [ x   ] Lives with Family  [    ] Home Health Aid    Dwelling:  [    ] Apartment  [  x  ] Private House  [    ] Adult Home  [    ] Skilled Nursing Facility      [    ] Short Term  [    ] Long Term  [  x  ] Stairs                           [    ] Elevator     FUNCTIONAL STATUS PTA: (Check all that apply)  Ambulation: [ x    ]Independent    [    ] Dependent     [    ] Non-Ambulatory  Assistive Device: [x   ] SA Cane  [    ]  Q Cane  [  x  ] Walker  [    ]  Wheelchair  ADL : [  x  ] Independent  [    ]  Dependent       Vital Signs Last 24 Hrs  T(C): 37 (07 Oct 2019 12:00), Max: 37.2 (07 Oct 2019 10:15)  T(F): 98.6 (07 Oct 2019 12:00), Max: 99 (07 Oct 2019 10:15)  HR: 66 (07 Oct 2019 14:00) (66 - 100)  BP: 107/50 (07 Oct 2019 14:00) (99/42 - 187/85)  BP(mean): 71 (07 Oct 2019 14:00) (56 - 94)  RR: 19 (07 Oct 2019 14:00) (16 - 22)  SpO2: 99% (07 Oct 2019 14:00) (97% - 100%)      PHYSICAL EXAM: Alert & Oriented X2 sl confused  GENERAL: NAD, well-groomed, well-developed  HEAD:  Atraumatic, Normocephalic  EYES: EOMI, PERRLA, conjunctiva and sclera clear  NECK: Supple  CHEST/LUNG: Clear bilaterally  HEART: Regular rate and rhythm  ABDOMEN: Soft, Nontender, Nondistended; Bowel sounds present  EXTREMITIES:  no calf tenderness,no edema BLES  L shoulder ecchymoses    NERVOUS SYSTEM:  Cranial Nerves 2-12 intact [ x   ] Abnormal  [    ]  ROM: WFL all extremities [    ]  Abnormal [     ] L shoulder not tested otherwise WFL  Motor Strength: WFL all extremities  [    ]  Abnormal [ x   ]L shoulder not tested otherwise 4/5- + rigidity  Sensation: intact to light touch [ x   ] Abnormal [    ]      FUNCTIONAL STATUS:  Bed Mobility: [   ]  Independent [    ]  Supervision [  x  ]  Needs Assistance [  ]  N/A  Transfers: [    ]  Independent [    ]  Supervision [    ]  Needs Assistance [   x ]  N/A    Ambulation:  [    ]  Independent [    ]  Supervision [    ]  Needs Assistance [x    ]  N/A   ADL:  [    ]   Independent [ x   ] Requires Assistance [    ] N/A   CG BED MOBS, GOOD SITTING BALANCE    LABS:                        14.4   8.40  )-----------( 204      ( 07 Oct 2019 00:46 )             44.8     10-07    141  |  101  |  19  ----------------------------<  116<H>  4.4   |  26  |  1.3    Ca    9.7      07 Oct 2019 00:46    TPro  7.1  /  Alb  4.7  /  TBili  0.3  /  DBili  x   /  AST  19  /  ALT  10  /  AlkPhos  72  10-07      Urinalysis Basic - ( 07 Oct 2019 03:50 )    Color: Light Yellow / Appearance: Clear / SG: >1.050 / pH: x  Gluc: x / Ketone: Trace  / Bili: Negative / Urobili: <2 mg/dL   Blood: x / Protein: Trace / Nitrite: Positive   Leuk Esterase: Negative / RBC: 4 /HPF / WBC 2 /HPF   Sq Epi: x / Non Sq Epi: 2 /HPF / Bacteria: Many        RADIOLOGY & ADDITIONAL STUDIES:

## 2019-10-07 NOTE — ED PROVIDER NOTE - ATTENDING CONTRIBUTION TO CARE
79y f h/o PD, R hip fx s/p repair in past, p/w L shoulder pain s/p fall. Pt lives in apartment upstairs from her family, granddaughter heard her fall around 11:30PM. Family went upstairs and found her lying face down on R side, awake but appeared stunned. Helped her up, c/o L shoulder pain. +Clavicle deformity appreciated. Pt does not recall details of fall. Unable to say if she tripped or passed out. Uses walker PRN @ baseline, h/o previous falls from PD. Denies any current ha, neck pain, cp/sob, nv, abd pain, back pain, focal numbness or weakness. PE: frail elderly f nad, ncat, perrl/eomi, neck supple, chest- +hematoma overlying distal clavicle, no chest wall crepitus, rrr nl s1s2 no mrg, ctab no wrr, abd soft ntnd no palpable masses no rgr, back no midline or paraspinal ttp, pelvis stable, ext- +LUE +distal clavicle hematoma, unable to range shoulder 2/2 pain, humerus/elbow non-tender, +abrasions noted to L elbow, remainder of LUE with full rom/strength/sensation throughout, dpi, remainder of ext exam nl, neuro aaox3 ext exam as documented, grossly nf exam.

## 2019-10-07 NOTE — H&P ADULT - HISTORY OF PRESENT ILLNESS
79F, PMHx Parkinsons, Hypothyroid, presents to ED s/p fall. As per family, they heard the pt fall around 1130pm, pt was found down lying onto her left side, +HT, ?LOC, -AC. Pt does not recall how she fell. Pt presents w/ Left shoulder pain. Otherwise no HA, no CP, no SOB, no abdominal pain, no dysuria.

## 2019-10-07 NOTE — H&P ADULT - ASSESSMENT
ASSESSMENT/PLAN:   79F, PMHx Parkinsons, Hypothyroid, presents to ED s/p unwitnessed fall at 1130pm, found down by family lying onto her Left side, +HT, ?LOC, -AC, unsure how she fell. Presents w/ L shoulder pain. Pulling 1000 on IS.   - Acute displaced midshaft L clavicle fx   - Acute L 3,4,5,6,7 rib fx  - Ascending thoracic aorta aneurysm 4.1cm        - SICU consult   - incentive spirometry   - pain control   - F/U orthopedics consult for clavicle fx   - F/U CT consult for Ascending thoracic aorta aneurysm 4.1cm Trauma Senior Resident Note, PGY3    ASSESSMENT:  This is a 79y Female presenting as a Trauma Consult , PMHx Parkinsons, Hypothyroid, presents to ED s/p unwitnessed fall at 1130pm, found down by family lying onto her Left side, +HT, ?LOC, -AC, unsure how she fell. Presents w/ L shoulder pain. Pulling 1000 on IS.   - Acute displaced midshaft L clavicle fx   - Acute L 3,4,5,6,7 rib fx  - Ascending thoracic aorta aneurysm 4.1cm      PLAN:   Trauma Labs reviewed, as above, no significant abnormalities  Trauma Imaging reviewed by trauma and ED teams, significant abnormalities as below:  - CXR no PTX/effusion  - CT Chest/Ab/Pelvis - multiple rib frx as above, incidental thoracic aortic aneurysm    Additional studies:  EKG  UA (+) Nitrite  Extremity films:  LUE (-) Frx  - SICU consulted for pain control, consideration of epidural for pain control  Otherwise Po regimen includes Tylenol, Motrin standing, Oxy PRN  - incentive spirometry 10x/h  - F/U orthopedics consult for clavicle fx, Sling LUE  - F/U CT consult for Ascending thoracic aorta aneurysm 4.1cm, incidental finding, normotensive  - PT/Rehab/SW  - Hospitalis C/S placed  Trauma surgery admission    Above plan discussed with Trauma attending, Dr. Cronin, patient/patient family, and ED team  --------------------------------------------------------------------------------------  10-07-19 @ 08:54

## 2019-10-07 NOTE — CONSULT NOTE ADULT - SUBJECTIVE AND OBJECTIVE BOX
HPI:  79F, PMHx Parkinsons, Hypothyroid, presents to ED s/p fall. As per family, they heard the pt fall around 1130pm, pt was found down lying onto her left side, +HT, ?LOC, -AC. Pt does not recall how she fell. Pt presents w/ Left shoulder pain. Otherwise no HA, no CP, no SOB, no abdominal pain, no dysuria. (07 Oct 2019 04:27) Ortho called for consult for left clavicle fracture     PAST MEDICAL & SURGICAL HISTORY:  Hypothyroid  Parkinsonian syndrome      MEDICATIONS  (STANDING):  acetaminophen   Tablet .. 650 milliGRAM(s) Oral every 6 hours  carbidopa/levodopa  25/100 1 Tablet(s) Oral three times a day  carbidopa/levodopa CR 50/200 1 Tablet(s) Oral <User Schedule>  chlorhexidine 4% Liquid 1 Application(s) Topical every 12 hours  docusate sodium 100 milliGRAM(s) Oral daily  donepezil 10 milliGRAM(s) Oral at bedtime  escitalopram 20 milliGRAM(s) Oral daily  heparin  Injectable 5000 Unit(s) SubCutaneous every 8 hours  influenza   Vaccine 0.5 milliLiter(s) IntraMuscular once  lactated ringers. 1000 milliLiter(s) (90 mL/Hr) IV Continuous <Continuous>  levothyroxine 88 MICROGram(s) Oral daily  pantoprazole    Tablet 40 milliGRAM(s) Oral before breakfast  senna 2 Tablet(s) Oral at bedtime  simvastatin 20 milliGRAM(s) Oral at bedtime                            14.4   8.40  )-----------( 204      ( 07 Oct 2019 00:46 )             44.8       10-07    141  |  101  |  19  ----------------------------<  116<H>  4.4   |  26  |  1.3    Ca    9.7      07 Oct 2019 00:46    TPro  7.1  /  Alb  4.7  /  TBili  0.3  /  DBili  x   /  AST  19  /  ALT  10  /  AlkPhos  72  10-07      < from: Xray Shoulder 2 Views, Left (10.07.19 @ 01:58) >  EXAM:  XR ELBOW COMP MIN 3V LT        EXAM:  XR HUMERUS MIN 2 VIEWS LT        EXAM:  XR SHOULDER COMP MIN 2V LT        EXAM:  XR CLAVICLE COMPLETE LT        EXAM:  XR CHEST FRONTAL 1V            PROCEDURE DATE:  10/07/2019            INTERPRETATION:  Clinical History / Reason for exam: Trauma    Comparison : Chest radiograph None.    Technique/Positioning: Single frontal chest radiograph. 2 radiographs of   the left clavicle. 3 radiographs of the left shoulder. 2 radiographs of   the left humerus. 3 radiographs of the left elbow.    Findings:    Skeleton/soft tissues: Acute displaced midshaft fracture of the left   clavicle. Multiple left-sided rib fractures. No shoulder dislocation.   Glenohumeral joint space maintained. No significant elbow joint effusion.  Support devices: None.  Cardiac/mediastinum/hilum: Atherosclerotic aorta. Normal size heart.  Lung parenchyma/Pleura: No focal pulmonary consolidation, pleural   effusion, or pneumothorax.  Impression:    Acute displaced midshaft fracture of the left clavicle. Multiple   left-sided rib fractures.   LUCIANA TORRES M.D., ATTENDING RADIOLOGIST    < end of copied text >      Vital Signs Last 24 Hrs  T(C): 36.9 (07 Oct 2019 09:00), Max: 36.9 (07 Oct 2019 07:21)  T(F): 98.4 (07 Oct 2019 09:00), Max: 98.4 (07 Oct 2019 07:21)  HR: 85 (07 Oct 2019 09:00) (81 - 100)  BP: 133/74 (07 Oct 2019 09:00) (130/63 - 187/85)  BP(mean): --  RR: 18 (07 Oct 2019 09:00) (16 - 18)  SpO2: 98% (07 Oct 2019 09:00) (97% - 99%)      Physical Exam:           Assessment: left clavicle fracture                         sling for comfort                        nwb left ue                         pain control                        outpatient follow up [phone]

## 2019-10-07 NOTE — CONSULT NOTE ADULT - ASSESSMENT
ASSESSMENT/PLAN: 79yFemale s/p mechanical fall. L rib fx 3-7. 6th rib flail segment, 3 rib displaced. L displaced clavicle fracture      Neurologic: Dx of Parkinsons restarted Sinemet and Aricept. Monitor for changes in mental status. Pain controlled with Tylenol/Motrin.     Respiratory: L sided 3-7 rib fx (6th rib flail segment, 3rd rib displaced). No hx of lung disease. NC @ 2L satting 98%. Pulling 1500 on IS, encourage IS 10x per hour. AM CXR.     Cardiovascular: No dx. Keep normotensive. CE x3 ordered, 1st negative.     Gastrointestinal/Nutrition: Regular diet. LR @ 100cc. PPI/Senna.     Genitourinary/Renal: No torres. Voiding. Monitor lytes replete as needed.     Hematologic: HSQ. Monitor H/H.     Infectious Disease: Afebrile. Trend WBC.     Endocrine: No dx     Disposition: SICU ASSESSMENT/PLAN: 79yFemale s/p mechanical fall. L rib fx 3-7. 6th rib flail segment, 3 rib displaced. L displaced clavicle fracture      Neurologic: Dx of Parkinsons restarted Sinemet and Aricept. Monitor for changes in mental status. Pain controlled with Tylenol/Motrin.     Respiratory: L sided 3-7 rib fx (6th rib flail segment, 3rd rib displaced). No hx of lung disease. NC @ 2L satting 98%. Pulling 1500 on IS, encourage IS 10x per hour. AM CXR.     Cardiovascular: No dx. Keep normotensive. CE x3 ordered, 1st negative.     Gastrointestinal/Nutrition: Regular diet. LR @ 100cc. PPI/Senna.     Genitourinary/Renal: No torres. Voiding. Monitor lytes replete as needed.     Hematologic: HSQ. Monitor H/H.     MSK: L displaced clavicle fracture. Ortho consulted.     Infectious Disease: Afebrile. Trend WBC.     Endocrine: No dx     Disposition: SICU ASSESSMENT/PLAN: 79yFemale s/p mechanical fall. L rib fx 3-7. 6th rib flail segment, 3 rib displaced. L displaced clavicle fracture      Neurologic: Dx of Parkinsons restarted Sinemet and Aricept. Monitor for changes in mental status. Pain controlled with Tylenol/Motrin.     Respiratory: L sided 3-7 rib fx (6th rib flail segment, 3rd rib displaced). No hx of lung disease. NC @ 2L satting 98%. Pulling 1500 on IS, encourage IS 10x per hour. AM CXR.     Cardiovascular: No dx. Keep normotensive. CE x3 ordered, 1st negative.     Gastrointestinal/Nutrition: Regular diet. LR @ 100cc. PPI/Senna.     Genitourinary/Renal: No torres. Voiding. Monitor lytes replete as needed. UA nitrite+ and leuko +. Urine culture sent.     Hematologic: HSQ. Monitor H/H.     MSK: L displaced clavicle fracture. Ortho consulted.     Infectious Disease: Afebrile. Trend WBC.     Endocrine: No dx     Disposition: SICU

## 2019-10-07 NOTE — ED PROVIDER NOTE - PHYSICAL EXAMINATION
CONSTITUTIONAL: Well-developed; well-nourished; in no acute distress.   SKIN: warm, dry  HEAD: Normocephalic; no skull indentations, no contusions or lacerations  EYES: no gross trauma bilaterally, no proptosis  ENT: No nasal discharge; airway clear. no racoon eyes no martins sign  NECK: no midline tenderness, normal ROM  CHEST: no crepitus or bruising  CARD: S1, S2 normal; no murmurs, gallops, or rubs. Regular rate and rhythm.   RESP: No wheezes, rales or rhonchi.  ABD: soft ntnd  BACK: no midline tenderness or step offs  PELVIS: no laxity with lateral compression  EXT: + deformity to left clavicle; no tenderness to palpation to glenoid; +abrasion to left elbow and left pinky; ROM intact to left elbow and left wrist, no tenderness to palpation to left elbow or wrist  NEURO: gcs 15, moving all extremities grossly, following commands  PSYCH: Cooperative, appropriate

## 2019-10-07 NOTE — ED PROVIDER NOTE - CARE PLAN
Principal Discharge DX:	Fall  Secondary Diagnosis:	Rib fractures  Secondary Diagnosis:	Clavicular fracture

## 2019-10-07 NOTE — ED PROVIDER NOTE - OBJECTIVE STATEMENT
80 y/o female with pmhx of Parkinson's presents s/p fall. Per family, they heard patient fall around 11:30pm, found her lying face down on right side. Unknown head trauma, LOC. Patient is currently complaining of L shoulder pain - does not recall exactly how she fell. Patient is a poor historian. Denies headache, neck pain, sob, chest pain, abdominal pain, n/v/d, leg pain.

## 2019-10-07 NOTE — H&P ADULT - ATTENDING COMMENTS
Trauma Senior Resident Note, PGY3    ASSESSMENT:  This is a 79y Female presenting as a Trauma Consult , PMHx Parkinsons, Hypothyroid, presents to ED s/p unwitnessed fall at 1130pm, found down by family lying onto her Left side, +HT, ?LOC, -AC, unsure how she fell. Presents w/ L shoulder pain. Pulling 1000 on IS.   - Acute displaced midshaft L clavicle fx   - Acute L 3,4,5,6,7 rib fx  - Ascending thoracic aorta aneurysm 4.1cm      PLAN:   Trauma Labs reviewed, as above, no significant abnormalities  Trauma Imaging reviewed by trauma and ED teams, significant abnormalities as below:  - CXR no PTX/effusion  - CT Chest/Ab/Pelvis - multiple rib frx as above, incidental thoracic aortic aneurysm    Additional studies:  EKG  UA (+) Nitrite  Extremity films:  LUE (-) Frx  - SICU consulted for pain control, consideration of epidural for pain control  Otherwise Po regimen includes Tylenol, Motrin standing, Oxy PRN  - incentive spirometry 10x/h  - F/U orthopedics consult for clavicle fx, Sling LUE  - F/U CT consult for Ascending thoracic aorta aneurysm 4.1cm, incidental finding, normotensive  - PT/Rehab/SW  - Hospitalis C/S placed  Trauma surgery admission

## 2019-10-07 NOTE — ED ADULT NURSE NOTE - CHPI ED NUR SYMPTOMS NEG
no fever/no tingling/no confusion/no deformity/no vomiting/no weakness/no loss of consciousness/no numbness/no bleeding/no abrasion

## 2019-10-08 LAB
ANION GAP SERPL CALC-SCNC: 11 MMOL/L — SIGNIFICANT CHANGE UP (ref 7–14)
ANION GAP SERPL CALC-SCNC: 15 MMOL/L — HIGH (ref 7–14)
APTT BLD: 30.6 SEC — SIGNIFICANT CHANGE UP (ref 27–39.2)
BUN SERPL-MCNC: 13 MG/DL — SIGNIFICANT CHANGE UP (ref 10–20)
BUN SERPL-MCNC: 14 MG/DL — SIGNIFICANT CHANGE UP (ref 10–20)
CALCIUM SERPL-MCNC: 8.4 MG/DL — LOW (ref 8.5–10.1)
CALCIUM SERPL-MCNC: 8.9 MG/DL — SIGNIFICANT CHANGE UP (ref 8.5–10.1)
CHLORIDE SERPL-SCNC: 102 MMOL/L — SIGNIFICANT CHANGE UP (ref 98–110)
CHLORIDE SERPL-SCNC: 98 MMOL/L — SIGNIFICANT CHANGE UP (ref 98–110)
CK MB CFR SERPL CALC: 2.8 NG/ML — SIGNIFICANT CHANGE UP (ref 0.6–6.3)
CK SERPL-CCNC: 280 U/L — HIGH (ref 0–225)
CO2 SERPL-SCNC: 24 MMOL/L — SIGNIFICANT CHANGE UP (ref 17–32)
CO2 SERPL-SCNC: 24 MMOL/L — SIGNIFICANT CHANGE UP (ref 17–32)
CREAT SERPL-MCNC: 0.7 MG/DL — SIGNIFICANT CHANGE UP (ref 0.7–1.5)
CREAT SERPL-MCNC: 0.7 MG/DL — SIGNIFICANT CHANGE UP (ref 0.7–1.5)
GLUCOSE SERPL-MCNC: 104 MG/DL — HIGH (ref 70–99)
GLUCOSE SERPL-MCNC: 89 MG/DL — SIGNIFICANT CHANGE UP (ref 70–99)
HCT VFR BLD CALC: 35.9 % — LOW (ref 37–47)
HCT VFR BLD CALC: 38.8 % — SIGNIFICANT CHANGE UP (ref 37–47)
HGB BLD-MCNC: 11.4 G/DL — LOW (ref 12–16)
HGB BLD-MCNC: 12.3 G/DL — SIGNIFICANT CHANGE UP (ref 12–16)
INR BLD: 1.01 RATIO — SIGNIFICANT CHANGE UP (ref 0.65–1.3)
MAGNESIUM SERPL-MCNC: 2.1 MG/DL — SIGNIFICANT CHANGE UP (ref 1.8–2.4)
MAGNESIUM SERPL-MCNC: 2.1 MG/DL — SIGNIFICANT CHANGE UP (ref 1.8–2.4)
MCHC RBC-ENTMCNC: 30.4 PG — SIGNIFICANT CHANGE UP (ref 27–31)
MCHC RBC-ENTMCNC: 30.6 PG — SIGNIFICANT CHANGE UP (ref 27–31)
MCHC RBC-ENTMCNC: 31.7 G/DL — LOW (ref 32–37)
MCHC RBC-ENTMCNC: 31.8 G/DL — LOW (ref 32–37)
MCV RBC AUTO: 96 FL — SIGNIFICANT CHANGE UP (ref 81–99)
MCV RBC AUTO: 96.5 FL — SIGNIFICANT CHANGE UP (ref 81–99)
NRBC # BLD: 0 /100 WBCS — SIGNIFICANT CHANGE UP (ref 0–0)
NRBC # BLD: 0 /100 WBCS — SIGNIFICANT CHANGE UP (ref 0–0)
PHOSPHATE SERPL-MCNC: 2.7 MG/DL — SIGNIFICANT CHANGE UP (ref 2.1–4.9)
PHOSPHATE SERPL-MCNC: 3.1 MG/DL — SIGNIFICANT CHANGE UP (ref 2.1–4.9)
PLATELET # BLD AUTO: 149 K/UL — SIGNIFICANT CHANGE UP (ref 130–400)
PLATELET # BLD AUTO: 167 K/UL — SIGNIFICANT CHANGE UP (ref 130–400)
POTASSIUM SERPL-MCNC: 4 MMOL/L — SIGNIFICANT CHANGE UP (ref 3.5–5)
POTASSIUM SERPL-MCNC: 4.3 MMOL/L — SIGNIFICANT CHANGE UP (ref 3.5–5)
POTASSIUM SERPL-SCNC: 4 MMOL/L — SIGNIFICANT CHANGE UP (ref 3.5–5)
POTASSIUM SERPL-SCNC: 4.3 MMOL/L — SIGNIFICANT CHANGE UP (ref 3.5–5)
PROTHROM AB SERPL-ACNC: 11.6 SEC — SIGNIFICANT CHANGE UP (ref 9.95–12.87)
RBC # BLD: 3.72 M/UL — LOW (ref 4.2–5.4)
RBC # BLD: 4.04 M/UL — LOW (ref 4.2–5.4)
RBC # FLD: 12.8 % — SIGNIFICANT CHANGE UP (ref 11.5–14.5)
RBC # FLD: 12.9 % — SIGNIFICANT CHANGE UP (ref 11.5–14.5)
SODIUM SERPL-SCNC: 137 MMOL/L — SIGNIFICANT CHANGE UP (ref 135–146)
SODIUM SERPL-SCNC: 137 MMOL/L — SIGNIFICANT CHANGE UP (ref 135–146)
TROPONIN T SERPL-MCNC: <0.01 NG/ML — SIGNIFICANT CHANGE UP
WBC # BLD: 7.86 K/UL — SIGNIFICANT CHANGE UP (ref 4.8–10.8)
WBC # BLD: 9.03 K/UL — SIGNIFICANT CHANGE UP (ref 4.8–10.8)
WBC # FLD AUTO: 7.86 K/UL — SIGNIFICANT CHANGE UP (ref 4.8–10.8)
WBC # FLD AUTO: 9.03 K/UL — SIGNIFICANT CHANGE UP (ref 4.8–10.8)

## 2019-10-08 PROCEDURE — 99291 CRITICAL CARE FIRST HOUR: CPT

## 2019-10-08 PROCEDURE — 93880 EXTRACRANIAL BILAT STUDY: CPT | Mod: 26

## 2019-10-08 PROCEDURE — 71045 X-RAY EXAM CHEST 1 VIEW: CPT | Mod: 26

## 2019-10-08 RX ORDER — CEFTRIAXONE 500 MG/1
1000 INJECTION, POWDER, FOR SOLUTION INTRAMUSCULAR; INTRAVENOUS ONCE
Refills: 0 | Status: COMPLETED | OUTPATIENT
Start: 2019-10-08 | End: 2019-10-08

## 2019-10-08 RX ORDER — CEFTRIAXONE 500 MG/1
1000 INJECTION, POWDER, FOR SOLUTION INTRAMUSCULAR; INTRAVENOUS ONCE
Refills: 0 | Status: DISCONTINUED | OUTPATIENT
Start: 2019-10-08 | End: 2019-10-08

## 2019-10-08 RX ADMIN — OXYCODONE HYDROCHLORIDE 5 MILLIGRAM(S): 5 TABLET ORAL at 20:45

## 2019-10-08 RX ADMIN — ESCITALOPRAM OXALATE 20 MILLIGRAM(S): 10 TABLET, FILM COATED ORAL at 11:32

## 2019-10-08 RX ADMIN — CARBIDOPA AND LEVODOPA 1 TABLET(S): 25; 100 TABLET ORAL at 05:50

## 2019-10-08 RX ADMIN — CEFTRIAXONE 100 MILLIGRAM(S): 500 INJECTION, POWDER, FOR SOLUTION INTRAMUSCULAR; INTRAVENOUS at 11:31

## 2019-10-08 RX ADMIN — Medication 650 MILLIGRAM(S): at 11:30

## 2019-10-08 RX ADMIN — OXYCODONE HYDROCHLORIDE 5 MILLIGRAM(S): 5 TABLET ORAL at 14:30

## 2019-10-08 RX ADMIN — SIMVASTATIN 20 MILLIGRAM(S): 20 TABLET, FILM COATED ORAL at 21:43

## 2019-10-08 RX ADMIN — PANTOPRAZOLE SODIUM 40 MILLIGRAM(S): 20 TABLET, DELAYED RELEASE ORAL at 06:51

## 2019-10-08 RX ADMIN — HEPARIN SODIUM 5000 UNIT(S): 5000 INJECTION INTRAVENOUS; SUBCUTANEOUS at 13:58

## 2019-10-08 RX ADMIN — Medication 650 MILLIGRAM(S): at 05:50

## 2019-10-08 RX ADMIN — CARBIDOPA AND LEVODOPA 1 TABLET(S): 25; 100 TABLET ORAL at 11:32

## 2019-10-08 RX ADMIN — Medication 650 MILLIGRAM(S): at 06:20

## 2019-10-08 RX ADMIN — DONEPEZIL HYDROCHLORIDE 10 MILLIGRAM(S): 10 TABLET, FILM COATED ORAL at 21:43

## 2019-10-08 RX ADMIN — CHLORHEXIDINE GLUCONATE 1 APPLICATION(S): 213 SOLUTION TOPICAL at 05:51

## 2019-10-08 RX ADMIN — Medication 650 MILLIGRAM(S): at 01:21

## 2019-10-08 RX ADMIN — HEPARIN SODIUM 5000 UNIT(S): 5000 INJECTION INTRAVENOUS; SUBCUTANEOUS at 05:50

## 2019-10-08 RX ADMIN — Medication 100 MILLIGRAM(S): at 11:32

## 2019-10-08 RX ADMIN — CARBIDOPA AND LEVODOPA 1 TABLET(S): 25; 100 TABLET ORAL at 20:54

## 2019-10-08 RX ADMIN — HEPARIN SODIUM 5000 UNIT(S): 5000 INJECTION INTRAVENOUS; SUBCUTANEOUS at 21:43

## 2019-10-08 RX ADMIN — Medication 650 MILLIGRAM(S): at 00:51

## 2019-10-08 RX ADMIN — Medication 88 MICROGRAM(S): at 05:50

## 2019-10-08 RX ADMIN — Medication 650 MILLIGRAM(S): at 19:00

## 2019-10-08 RX ADMIN — Medication 650 MILLIGRAM(S): at 18:30

## 2019-10-08 RX ADMIN — OXYCODONE HYDROCHLORIDE 5 MILLIGRAM(S): 5 TABLET ORAL at 14:00

## 2019-10-08 RX ADMIN — OXYCODONE HYDROCHLORIDE 5 MILLIGRAM(S): 5 TABLET ORAL at 04:48

## 2019-10-08 RX ADMIN — Medication 650 MILLIGRAM(S): at 12:00

## 2019-10-08 RX ADMIN — SENNA PLUS 2 TABLET(S): 8.6 TABLET ORAL at 21:43

## 2019-10-08 RX ADMIN — OXYCODONE HYDROCHLORIDE 5 MILLIGRAM(S): 5 TABLET ORAL at 04:18

## 2019-10-08 RX ADMIN — OXYCODONE HYDROCHLORIDE 5 MILLIGRAM(S): 5 TABLET ORAL at 20:15

## 2019-10-08 RX ADMIN — CARBIDOPA AND LEVODOPA 1 TABLET(S): 25; 100 TABLET ORAL at 18:37

## 2019-10-08 NOTE — PHYSICAL THERAPY INITIAL EVALUATION ADULT - GENERAL OBSERVATIONS, REHAB EVAL
13:44-14:35 51 min  pt rec in bed in NAD, EEG tech finishing with pt, pt agreeable to PT, all lines intact, VSS, pt c/o L UE/LE/rib pain, 9/10, RN gave pain meds

## 2019-10-08 NOTE — CHART NOTE - NSCHARTNOTEFT_GEN_A_CORE
SICU Transfer Note:    Transfer from: SICU  Transfer to:  ( x) Surgery    (  ) Telemetry    (  ) Medicine    (  ) Palliative    (  ) Stroke Unit    (  ) _______________      SICU COURSE:  78 y/o F PMHx Parkinsons and Hypothyroidism presents to the ED s/p mechanical fall unwitnessed. As per family, they heard the patient fall at 11:30 PM and patient was found down lying on her side. +HT, ?LOC, -AC. Patient has had multiple falls as per family 2/2 to parkinsons. Patient does not recall how she fall. Patient also presented with L shoulder pain. PAN Scan performed all negative except L sided 3-7 rib fx . Sixth rib is broken in two places. 3rd rib is displaced.  IS pulling >1500, satting well on NC. SICU consulted for respiratory monitoring and pain control. Patient hemodynamically stable.     PAST MEDICAL & SURGICAL HISTORY:  Hypothyroid  Parkinsonian syndrome    Allergies    No Known Allergies    Intolerances      MEDICATIONS  (STANDING):  acetaminophen   Tablet .. 650 milliGRAM(s) Oral every 6 hours  carbidopa/levodopa  25/100 1 Tablet(s) Oral <User Schedule>  carbidopa/levodopa CR 50/200 1 Tablet(s) Oral <User Schedule>  docusate sodium 100 milliGRAM(s) Oral daily  donepezil 10 milliGRAM(s) Oral at bedtime  escitalopram 20 milliGRAM(s) Oral daily  heparin  Injectable 5000 Unit(s) SubCutaneous every 8 hours  influenza   Vaccine 0.5 milliLiter(s) IntraMuscular once  levothyroxine 88 MICROGram(s) Oral daily  pantoprazole    Tablet 40 milliGRAM(s) Oral before breakfast  senna 2 Tablet(s) Oral at bedtime  simvastatin 20 milliGRAM(s) Oral at bedtime    MEDICATIONS  (PRN):  oxyCODONE    IR 5 milliGRAM(s) Oral every 6 hours PRN Moderate Pain (4 - 6)        Vital Signs Last 24 Hrs  T(C): 36.5 (08 Oct 2019 08:00), Max: 37 (07 Oct 2019 12:00)  T(F): 97.7 (08 Oct 2019 08:00), Max: 98.6 (07 Oct 2019 12:00)  HR: 70 (08 Oct 2019 10:00) (58 - 90)  BP: 134/60 (08 Oct 2019 10:00) (107/50 - 166/73)  BP(mean): 82 (08 Oct 2019 10:00) (71 - 125)  RR: 17 (08 Oct 2019 10:00) (14 - 34)  SpO2: 97% (08 Oct 2019 10:00) (93% - 99%)  I&O's Summary    07 Oct 2019 07:01  -  08 Oct 2019 07:00  --------------------------------------------------------  IN: 2540 mL / OUT: 1300 mL / NET: 1240 mL        LABS                                            11.4                  Neurophils% (auto):   x      (10-08 @ 00:37):    7.86 )-----------(149          Lymphocytes% (auto):  x                                             35.9                   Eosinphils% (auto):   x        Manual%: Neutrophils x    ; Lymphocytes x    ; Eosinophils x    ; Bands%: x    ; Blasts x                                    137    |  102    |  14                  Calcium: 8.4   / iCa: x      (10-08 @ 00:37)    ----------------------------<  89        Magnesium: 2.1                              4.0     |  24     |  0.7              Phosphorous: 3.1      Daily     Daily Weight in k.1 (08 Oct 2019 04:00)    Diet, Regular (10-07-19 @ 06:18)      CURRENT MEDS:  Neurologic Medications  acetaminophen   Tablet .. 650 milliGRAM(s) Oral every 6 hours  carbidopa/levodopa  25/100 1 Tablet(s) Oral <User Schedule>  carbidopa/levodopa CR 50/200 1 Tablet(s) Oral <User Schedule>  donepezil 10 milliGRAM(s) Oral at bedtime  escitalopram 20 milliGRAM(s) Oral daily  oxyCODONE    IR 5 milliGRAM(s) Oral every 6 hours PRN Moderate Pain (4 - 6)    Respiratory Medications    Cardiovascular Medications    Gastrointestinal Medications  docusate sodium 100 milliGRAM(s) Oral daily  pantoprazole    Tablet 40 milliGRAM(s) Oral before breakfast  senna 2 Tablet(s) Oral at bedtime    Genitourinary Medications    Hematologic/Oncologic Medications  heparin  Injectable 5000 Unit(s) SubCutaneous every 8 hours  influenza   Vaccine 0.5 milliLiter(s) IntraMuscular once    Antimicrobial/Immunologic Medications    Endocrine/Metabolic Medications  levothyroxine 88 MICROGram(s) Oral daily  simvastatin 20 milliGRAM(s) Oral at bedtime    Topical/Other Medications      ICU Vital Signs Last 24 Hrs  T(C): 36.5 (08 Oct 2019 08:00), Max: 37 (07 Oct 2019 12:00)  T(F): 97.7 (08 Oct 2019 08:00), Max: 98.6 (07 Oct 2019 12:00)  HR: 70 (08 Oct 2019 10:00) (58 - 90)  BP: 134/60 (08 Oct 2019 10:00) (107/50 - 166/73)  BP(mean): 82 (08 Oct 2019 10:00) (71 - 125)  ABP: --  ABP(mean): --  RR: 17 (08 Oct 2019 10:00) (14 - 34)  SpO2: 97% (08 Oct 2019 10:00) (93% - 99%)        I&O's Summary    07 Oct 2019 07:01  -  08 Oct 2019 07:00  --------------------------------------------------------  IN: 2540 mL / OUT: 1300 mL / NET: 1240 mL      I&O's Detail    07 Oct 2019 07:01  -  08 Oct 2019 07:00  --------------------------------------------------------  IN:    lactated ringers.: 1980 mL    Oral Fluid: 560 mL  Total IN: 2540 mL    OUT:    Incontinent per Collection Ba mL  Total OUT: 1300 mL    Total NET: 1240 mL          PHYSICAL EXAM:  NEURO: NAD, alert, oriented x 3, no focal deficits    RESP: lungs clear to auscultation, normal expansion/effort      CV: S1, S2, regular rate and rhythm  Cardiac rhythm: normal sinus rhythm    GI: abdomen soft, nontender, nondistended,    Current diet:  PO    EXTREMITIES: extremities warm, pink, well perfused    DERM: good skin turgor, no skin breakdown      LINES/TUBES/DRAINS:  - peripheral IV      LABS:  CAPILLARY BLOOD GLUCOSE                              11.4   7.86  )-----------( 149      ( 08 Oct 2019 00:37 )             35.9       10    137  |  102  |  14  ----------------------------<  89  4.0   |  24  |  0.7    Ca    8.4<L>      08 Oct 2019 00:37  Phos  3.1     10  Mg     2.1     10-08    TPro  7.1  /  Alb  4.7  /  TBili  0.3  /  DBili  x   /  AST  19  /  ALT  10  /  AlkPhos  72  10        CARDIAC MARKERS ( 08 Oct 2019 00:37 )  x     / <0.01 ng/mL / 280 U/L / x     / 2.8 ng/mL  CARDIAC MARKERS ( 07 Oct 2019 16:26 )  x     / <0.01 ng/mL / x     / x     / x      CARDIAC MARKERS ( 07 Oct 2019 00:46 )  x     / <0.01 ng/mL / x     / x     / x          Urinalysis Basic - ( 07 Oct 2019 03:50 )    Color: Light Yellow / Appearance: Clear / SG: >1.050 / pH: x  Gluc: x / Ketone: Trace  / Bili: Negative / Urobili: <2 mg/dL   Blood: x / Protein: Trace / Nitrite: Positive   Leuk Esterase: Negative / RBC: 4 /HPF / WBC 2 /HPF   Sq Epi: x / Non Sq Epi: 2 /HPF / Bacteria: Many        Culture - Urine (collected 07 Oct 2019 03:50)  Source: .Urine Clean Catch (Midstream)  Preliminary Report (08 Oct 2019 09:02):    >100,000 CFU/ml Gram Negative Rods        ASSESSMENT/PLAN: 79yFemale    Neurologic: Dx of Parkinsons restarted Sinemet and Aricept, also back on Lexapro.  Monitor for changes in mental status. Pain controlled with Tylenol ATC, d/c'd Ibuprofen, added oxy prn.    Respiratory: L sided 3-7 rib fx (6th rib flail segment, 3rd rib displaced). No hx of lung disease. NC @ 2L satting 98%. Pulling 750-1000 on IS.     Cardiovascular: No dx. Keep normotensive. CE neg x3    Gastrointestinal/Nutrition: Regular diet. Kept LR @ 90cc for KALPANA. PPI/Senna.     Genitourinary/Renal: No torres. Voiding.  UA nitrite+ and leuko +. Urine culture still pending  -KALPANA with Cr 1.3 (baseline 0.7 in 2017), continue to trend, kept IVF: LR @ 90/hr, d/c'd ibuprofen.  1600 BMP- Cr 0.9  Lytes-Na 137 // K 4.0 // Phos 3.1 //  Mag 2.1    Hematologic: HSQ. Stable H/H 11.4    MSK: L displaced clavicle fracture. Ortho consulted -> no surgical intervention, recom sling, NWB LUE and f/up as outpt.    Infectious Disease: Afebrile. WBC 8.4> 7.8 trend, + UA-->UA+ve, given 1g ceftriaxone x1    Endocrine: hypothyroid -> cont levothyroxine    T/L/D: PIV     Dispo: to floor    Signed out to Lisa 12:00PM 10/8/19

## 2019-10-08 NOTE — PROGRESS NOTE ADULT - SUBJECTIVE AND OBJECTIVE BOX
RODGER REYNA  [id number]  79y Female    Indication for ICU admission: s/p mechanical fall L rib fx 3-7 (6th rib flailing, 3 rib fx displaced) L displaced clavical fracture     Admit Date:10-07-19  ICU Date: 10/7  OR Date:    No Known Allergies    PAST MEDICAL & SURGICAL HISTORY:  Hypothyroid  Parkinsonian syndrome    Home Medications:  CARB/LEVO    TAB 25-100MG:  (07 Oct 2019 04:45)  CARB/LEVO ER TAB 50-200MG:  (07 Oct 2019 04:45)  DONEPEZIL    TAB 10MG:  (07 Oct 2019 04:45)  ESCITALOPRAM TAB 20MG:  (07 Oct 2019 04:45)  LEVOTHYROXIN TAB 88MCG:  (07 Oct 2019 04:45)  SIMVASTATIN  TAB 20MG:  (07 Oct 2019 04:45)    24HRS EVENT:    CE neg x3, urine culture still pending, Left shoulder is in a sling. No repletions needed.     Neurologic: Dx of Parkinsons restarted Sinemet and Aricept, also back on Lexapro.  Monitor for changes in mental status. Pain controlled with Tylenol ATC, d/c'd Ibuprofen, added oxy prn.    Respiratory: L sided 3-7 rib fx (6th rib flail segment, 3rd rib displaced). No hx of lung disease. NC @ 2L satting 98%. Pulling 750-1000 on IS.     Cardiovascular: No dx. Keep normotensive. CE neg x3    Gastrointestinal/Nutrition: Regular diet. Kept LR @ 90cc for KALPANA. PPI/Senna.     Genitourinary/Renal: No torres. Voiding.  UA nitrite+ and leuko +. Urine culture still pending  -KALPANA with Cr 1.3 (baseline 0.7 in 2017), continue to trend, kept IVF: LR @ 90/hr, d/c'd ibuprofen.  1600 BMP- Cr 0.9  Lytes-Na 137 // K 4.0 // Phos 3.1 //  Mag 2.1    Hematologic: HSQ. Stable H/H 11.4    MSK: L displaced clavicle fracture. Ortho consulted -> no surgical intervention, recom sling, NWB LUE and f/up as outpt.    Infectious Disease: Afebrile. WBC 8.4> 7.8 trend, + UA-->urine cx sent, still pending    Endocrine: hypothyroid -> cont levothyroxine      DVT PTX: Hep sq    GI PTX: PPI    ***Tubes/Lines/Drains  ***  Peripheral IV  Central Venous Line     	Date   Arterial Line		                Date   [] PICC:         	[] Midline		[] Mediport             Urinary Catheter		Indication: Strict I&O    Date Placed:       REVIEW OF SYSTEMS    [ X] A ten-point review of systems was otherwise negative except as noted.  [ ] Due to altered mental status/intubation, subjective information were not able to be obtained from the patient. History was obtained, to the extent possible, from review of the chart and collateral sources of information. RODGER REYNA  [id number]  79y Female    Indication for ICU admission: s/p mechanical fall L rib fx 3-7 (6th rib flailing, 3 rib fx displaced) L displaced clavical fracture     Admit Date:10-07-19  ICU Date: 10/7  OR Date:    No Known Allergies    PAST MEDICAL & SURGICAL HISTORY:  Hypothyroid  Parkinsonian syndrome    Home Medications:  CARB/LEVO    TAB 25-100MG:  (07 Oct 2019 04:45)  CARB/LEVO ER TAB 50-200MG:  (07 Oct 2019 04:45)  DONEPEZIL    TAB 10MG:  (07 Oct 2019 04:45)  ESCITALOPRAM TAB 20MG:  (07 Oct 2019 04:45)  LEVOTHYROXIN TAB 88MCG:  (07 Oct 2019 04:45)  SIMVASTATIN  TAB 20MG:  (07 Oct 2019 04:45)    24HRS EVENT:    CE neg x3, urine culture still pending, Left shoulder is in a sling. No repletions needed.     Neurologic: Dx of Parkinsons restarted Sinemet and Aricept, also back on Lexapro.  Monitor for changes in mental status. Pain controlled with Tylenol ATC, d/c'd Ibuprofen, added oxy prn.    Respiratory: L sided 3-7 rib fx (6th rib flail segment, 3rd rib displaced). No hx of lung disease. NC @ 2L satting 98%. Pulling 750-1000 on IS.     Cardiovascular: No dx. Keep normotensive. CE neg x3    Gastrointestinal/Nutrition: Regular diet. Kept LR @ 90cc for KALPANA. PPI/Senna.     Genitourinary/Renal: No torres. Voiding.  UA nitrite+ and leuko +. Urine culture still pending  -KALPANA with Cr 1.3 (baseline 0.7 in 2017), continue to trend, kept IVF: LR @ 90/hr, d/c'd ibuprofen.  1600 BMP- Cr 0.9  Lytes-Na 137 // K 4.0 // Phos 3.1 //  Mag 2.1    Hematologic: HSQ. Stable H/H 11.4    MSK: L displaced clavicle fracture. Ortho consulted -> no surgical intervention, recom sling, NWB LUE and f/up as outpt.    Infectious Disease: Afebrile. WBC 8.4> 7.8 trend, + UA-->urine cx sent, still pending    Endocrine: hypothyroid -> cont levothyroxine      DVT PTX: Hep sq    GI PTX: PPI    ***Tubes/Lines/Drains  ***  Peripheral IV  Central Venous Line     	Date   Arterial Line		                Date   [] PICC:         	[] Midline		[] Mediport             Urinary Catheter		Indication: Strict I&O    Date Placed:       REVIEW OF SYSTEMS    [ X] A ten-point review of systems was otherwise negative except as noted.  [ ] Due to altered mental status/intubation, subjective information were not able to be obtained from the patient. History was obtained, to the extent possible, from review of the chart and collateral sources of information.      Daily Height in cm: 160.02 (07 Oct 2019 10:15)    Daily Weight in k.1 (08 Oct 2019 04:00)    Diet, Regular (10-07-19 @ 06:18)      CURRENT MEDS:  Neurologic Medications  acetaminophen   Tablet .. 650 milliGRAM(s) Oral every 6 hours  carbidopa/levodopa  25/100 1 Tablet(s) Oral <User Schedule>  carbidopa/levodopa CR 50/200 1 Tablet(s) Oral <User Schedule>  donepezil 10 milliGRAM(s) Oral at bedtime  escitalopram 20 milliGRAM(s) Oral daily  oxyCODONE    IR 5 milliGRAM(s) Oral every 6 hours PRN Moderate Pain (4 - 6)    Respiratory Medications    Cardiovascular Medications    Gastrointestinal Medications  docusate sodium 100 milliGRAM(s) Oral daily  lactated ringers. 1000 milliLiter(s) IV Continuous <Continuous>  pantoprazole    Tablet 40 milliGRAM(s) Oral before breakfast  senna 2 Tablet(s) Oral at bedtime    Genitourinary Medications    Hematologic/Oncologic Medications  heparin  Injectable 5000 Unit(s) SubCutaneous every 8 hours  influenza   Vaccine 0.5 milliLiter(s) IntraMuscular once    Antimicrobial/Immunologic Medications    Endocrine/Metabolic Medications  levothyroxine 88 MICROGram(s) Oral daily  simvastatin 20 milliGRAM(s) Oral at bedtime    Topical/Other Medications      ICU Vital Signs Last 24 Hrs  T(C): 36.7 (08 Oct 2019 04:00), Max: 37.2 (07 Oct 2019 10:15)  T(F): 98 (08 Oct 2019 04:00), Max: 99 (07 Oct 2019 10:15)  HR: 58 (08 Oct 2019 07:00) (58 - 90)  BP: 130/56 (08 Oct 2019 07:00) (99/42 - 166/73)  BP(mean): 97 (08 Oct 2019 07:00) (56 - 125)  ABP: --  ABP(mean): --  RR: 15 (08 Oct 2019 07:00) (14 - 34)  SpO2: 96% (08 Oct 2019 07:00) (93% - 100%)        I&O's Summary    07 Oct 2019 07:01  -  08 Oct 2019 07:00  --------------------------------------------------------  IN: 2540 mL / OUT: 1300 mL / NET: 1240 mL      I&O's Detail    07 Oct 2019 07:01  -  08 Oct 2019 07:00  --------------------------------------------------------  IN:    lactated ringers.: 1980 mL    Oral Fluid: 560 mL  Total IN: 2540 mL    OUT:    Incontinent per Collection Ba mL  Total OUT: 1300 mL    Total NET: 1240 mL          PHYSICAL EXAM:    General/Neuro  alert & oriented x 3, no focal deficits  Pupils: PERRLA, EOMI    Lungs:    clear to auscultation, Normal expansion/effort.     Cardiovascular : S1, S2.  Regular rate and rhythm.   Cardiac Rhythm: Normal Sinus Rhythm    GI: Abdomen soft, Non-tender, Non-distended.      Extremities: Extremities warm, pink, well-perfused. Pulses palp b/l  LUE in sling, clavicle TTP    Derm: Good skin turgor, no skin breakdown.      :       Torres catheter in place.      CXR:       LABS:  CAPILLARY BLOOD GLUCOSE                              11.4   7.86  )-----------( 149      ( 08 Oct 2019 00:37 )             35.9         10-08    137  |  102  |  14  ----------------------------<  89  4.0   |  24  |  0.7    Ca    8.4<L>      08 Oct 2019 00:37  Phos  3.1     10-08  Mg     2.1     10-08    TPro  7.1  /  Alb  4.7  /  TBili  0.3  /  DBili  x   /  AST  19  /  ALT  10  /  AlkPhos  72  10-07        CARDIAC MARKERS ( 08 Oct 2019 00:37 )  x     / <0.01 ng/mL / 280 U/L / x     / 2.8 ng/mL  CARDIAC MARKERS ( 07 Oct 2019 16:26 )  x     / <0.01 ng/mL / x     / x     / x      CARDIAC MARKERS ( 07 Oct 2019 00:46 )  x     / <0.01 ng/mL / x     / x     / x          Urinalysis Basic - ( 07 Oct 2019 03:50 )    Color: Light Yellow / Appearance: Clear / SG: >1.050 / pH: x  Gluc: x / Ketone: Trace  / Bili: Negative / Urobili: <2 mg/dL   Blood: x / Protein: Trace / Nitrite: Positive   Leuk Esterase: Negative / RBC: 4 /HPF / WBC 2 /HPF   Sq Epi: x / Non Sq Epi: 2 /HPF / Bacteria: Many

## 2019-10-08 NOTE — PROGRESS NOTE ADULT - ASSESSMENT
ASSESSMENT:    Neurologic: Dx of Parkinsons restarted Sinemet and Aricept, also back on Lexapro.  Monitor for changes in mental status. Pain controlled with Tylenol ATC, d/c'd Ibuprofen, added oxy prn.    Respiratory: L sided 3-7 rib fx (6th rib flail segment, 3rd rib displaced). No hx of lung disease. NC @ 2L satting 98%. Pulling 750-1000 on IS.     Cardiovascular: No dx. Keep normotensive. CE neg x3    Gastrointestinal/Nutrition: Regular diet. Kept LR @ 90cc for KALPANA. PPI/Senna.     Genitourinary/Renal: No torres. Voiding.  UA nitrite+ and leuko +. Urine culture still pending  -KALPANA with Cr 1.3 (baseline 0.7 in 2017), continue to trend, kept IVF: LR @ 90/hr, d/c'd ibuprofen.  1600 BMP- Cr 0.9  Lytes-Na 137 // K 4.0 // Phos 3.1 //  Mag 2.1    Hematologic: HSQ. Stable H/H 11.4    MSK: L displaced clavicle fracture. Ortho consulted -> no surgical intervention, recom sling, NWB LUE and f/up as outpt.    Infectious Disease: Afebrile. WBC 8.4> 7.8 trend, + UA-->urine cx sent, still pending    Endocrine: hypothyroid -> cont levothyroxine

## 2019-10-09 PROCEDURE — 71045 X-RAY EXAM CHEST 1 VIEW: CPT | Mod: 26

## 2019-10-09 PROCEDURE — 93306 TTE W/DOPPLER COMPLETE: CPT | Mod: 26

## 2019-10-09 PROCEDURE — 95816 EEG AWAKE AND DROWSY: CPT | Mod: 26

## 2019-10-09 PROCEDURE — 99232 SBSQ HOSP IP/OBS MODERATE 35: CPT

## 2019-10-09 RX ADMIN — CARBIDOPA AND LEVODOPA 1 TABLET(S): 25; 100 TABLET ORAL at 21:31

## 2019-10-09 RX ADMIN — DONEPEZIL HYDROCHLORIDE 10 MILLIGRAM(S): 10 TABLET, FILM COATED ORAL at 21:31

## 2019-10-09 RX ADMIN — Medication 650 MILLIGRAM(S): at 17:10

## 2019-10-09 RX ADMIN — SENNA PLUS 2 TABLET(S): 8.6 TABLET ORAL at 21:31

## 2019-10-09 RX ADMIN — Medication 650 MILLIGRAM(S): at 05:33

## 2019-10-09 RX ADMIN — CARBIDOPA AND LEVODOPA 1 TABLET(S): 25; 100 TABLET ORAL at 17:11

## 2019-10-09 RX ADMIN — Medication 88 MICROGRAM(S): at 05:33

## 2019-10-09 RX ADMIN — Medication 650 MILLIGRAM(S): at 06:10

## 2019-10-09 RX ADMIN — Medication 650 MILLIGRAM(S): at 23:47

## 2019-10-09 RX ADMIN — CARBIDOPA AND LEVODOPA 1 TABLET(S): 25; 100 TABLET ORAL at 11:07

## 2019-10-09 RX ADMIN — ESCITALOPRAM OXALATE 20 MILLIGRAM(S): 10 TABLET, FILM COATED ORAL at 13:22

## 2019-10-09 RX ADMIN — HEPARIN SODIUM 5000 UNIT(S): 5000 INJECTION INTRAVENOUS; SUBCUTANEOUS at 21:32

## 2019-10-09 RX ADMIN — HEPARIN SODIUM 5000 UNIT(S): 5000 INJECTION INTRAVENOUS; SUBCUTANEOUS at 13:22

## 2019-10-09 RX ADMIN — Medication 650 MILLIGRAM(S): at 11:07

## 2019-10-09 RX ADMIN — CARBIDOPA AND LEVODOPA 1 TABLET(S): 25; 100 TABLET ORAL at 05:33

## 2019-10-09 RX ADMIN — PANTOPRAZOLE SODIUM 40 MILLIGRAM(S): 20 TABLET, DELAYED RELEASE ORAL at 05:33

## 2019-10-09 RX ADMIN — Medication 62.5 MILLIMOLE(S): at 05:34

## 2019-10-09 RX ADMIN — HEPARIN SODIUM 5000 UNIT(S): 5000 INJECTION INTRAVENOUS; SUBCUTANEOUS at 05:33

## 2019-10-09 RX ADMIN — Medication 100 MILLIGRAM(S): at 11:08

## 2019-10-09 RX ADMIN — SIMVASTATIN 20 MILLIGRAM(S): 20 TABLET, FILM COATED ORAL at 21:31

## 2019-10-09 NOTE — OCCUPATIONAL THERAPY INITIAL EVALUATION ADULT - PLANNED THERAPY INTERVENTIONS, OT EVAL
balance training/motor coordination training/ROM/parent/caregiver training.../ADL retraining/bed mobility training/fine motor coordination training/strengthening/stretching/transfer training/cognitive, visual perceptual/neuromuscular re-education

## 2019-10-09 NOTE — PROGRESS NOTE ADULT - SUBJECTIVE AND OBJECTIVE BOX
GENERAL SURGERY PROGRESS NOTE     RODGER REYNA  79y  Female  Hospital day :2d    OVERNIGHT EVENTS: Patient downgraded to floor. Pain well    T(F): 98.2 (10-08-19 @ 23:00), Max: 99.3 (10-08-19 @ 21:31)  HR: 71 (10-08-19 @ 23:00) (58 - 84)  BP: 142/67 (10-08-19 @ 23:00) (112/66 - 149/71)  RR: 18 (10-08-19 @ 23:00) (14 - 30)  SpO2: 96% (10-08-19 @ 20:00) (95% - 98%)    URINE:   10-07-19 @ 07:01  -  10-08-19 @ 07:00  --------------------------------------------------------  OUT: 1300 mL       GI proph:  pantoprazole    Tablet 40 milliGRAM(s) Oral before breakfast    AC/ proph: heparin  Injectable 5000 Unit(s) SubCutaneous every 8 hours    ABx:     PHYSICAL EXAM:  GENERAL: NAD, well-appearing  CHEST/LUNG: Clear to auscultation bilaterally, left chest wall tenderness  HEART: Regular rate and rhythm  ABDOMEN: Soft, Nontender, Nondistended;   EXTREMITIES:  No clubbing, cyanosis, or edema. Left art in sling      LABS  Labs:  CAPILLARY BLOOD GLUCOSE                              12.3   9.03  )-----------( 167      ( 08 Oct 2019 20:36 )             38.8         10-08    137  |  98  |  13  ----------------------------<  104<H>  4.3   |  24  |  0.7      Calcium, Total Serum: 8.9 mg/dL (10-08-19 @ 20:36)          Coags:     11.60  ----< 1.01    ( 08 Oct 2019 20:36 )     30.6        CARDIAC MARKERS ( 08 Oct 2019 00:37 )  x     / <0.01 ng/mL / 280 U/L / x     / 2.8 ng/mL  CARDIAC MARKERS ( 07 Oct 2019 16:26 )  x     / <0.01 ng/mL / x     / x     / x              Urinalysis Basic - ( 07 Oct 2019 03:50 )    Color: Light Yellow / Appearance: Clear / SG: >1.050 / pH: x  Gluc: x / Ketone: Trace  / Bili: Negative / Urobili: <2 mg/dL   Blood: x / Protein: Trace / Nitrite: Positive   Leuk Esterase: Negative / RBC: 4 /HPF / WBC 2 /HPF   Sq Epi: x / Non Sq Epi: 2 /HPF / Bacteria: Many        Culture - Urine (collected 07 Oct 2019 03:50)  Source: .Urine Clean Catch (Midstream)  Preliminary Report (08 Oct 2019 09:02):    >100,000 CFU/ml Gram Negative Rods          RADIOLOGY & ADDITIONAL TESTS:      < from: [facility] Duplex Carotid, Shadi (10.08.19 @ 13:19) >  Impression:    20-39% stenosis of the right internal carotid artery.    20-39% stenosis of the left internal carotid artery.    < end of copied text >

## 2019-10-09 NOTE — OCCUPATIONAL THERAPY INITIAL EVALUATION ADULT - NS ASR FOLLOW COMMAND OT EVAL
increased time to perform tasks due to parkinsons at baseline/75% of the time/able to follow single-step instructions

## 2019-10-09 NOTE — OCCUPATIONAL THERAPY INITIAL EVALUATION ADULT - GENERAL OBSERVATIONS, REHAB EVAL
pt received seated in b/s chair in NAD, +IV lock, +LUE sling readjusted prior to evaluation, pt agreeable to OT eval, left seated in b/s chair RN aware

## 2019-10-09 NOTE — PROGRESS NOTE ADULT - ASSESSMENT
79y Female presenting as a Trauma Consult , PMHx Parkinsons, Hypothyroid, presents to ED s/p unwitnessed fall at 1130pm, found down by family lying onto her Left side, +HT, ?LOC, -AC, unsure how she fell. Presents w/ L shoulder pain. Pulling 1000 on IS. Downgraded. Pain controlled. Carotid duplex negative.  - Acute displaced midshaft L clavicle fx   - Acute L 3,4,5,6,7 rib fx  - Ascending thoracic aorta aneurysm 4.1cm      PLAN:  -Pain management  -Encourage IS - inspiring 1000 mL  -PT: rehab facility  -F/U OT  -F/U Echo  -F/U EEG  -Ortho: out patient follow up with Dr. Pranay Rodriguez 79y Female presenting as a Trauma Consult , PMHx Parkinsons, Hypothyroid, presents to ED s/p unwitnessed fall at 1130pm, found down by family lying onto her Left side, +HT, ?LOC, -AC, unsure how she fell. Presents w/ L shoulder pain. Pulling 1000 on IS. Downgraded. Pain controlled. Carotid duplex negative.  - Acute displaced midshaft L clavicle fx   - Acute L 3,4,5,6,7 rib fx  - Ascending thoracic aorta aneurysm 4.1cm      PLAN:  -Pain management  -Encourage IS - inspiring 1000 mL  -PT: rehab facility  -F/U OT  -F/U Echo  -F/U EEG  -Ortho: out patient follow up with Dr. Pranay Rodriguez  -hypophosphatemia: repleted

## 2019-10-09 NOTE — OCCUPATIONAL THERAPY INITIAL EVALUATION ADULT - ADDITIONAL COMMENTS
pt reports she lives with her daughter, she did not perform stairs unless family was present to assist her, she was able to get to the bathroom and back independently with use of RW, family assisted with ADLs as needed

## 2019-10-09 NOTE — OCCUPATIONAL THERAPY INITIAL EVALUATION ADULT - RANGE OF MOTION EXAMINATION, UPPER EXTREMITY
LUE elbow ~1/2 AROM until painful, did not attempt increased PROM due to pain, wrist/digits WFL, shoulder not assessed secondary to NWB and no ROM at this time/Right UE Active ROM was WNL (within normal limits)

## 2019-10-10 PROCEDURE — 99222 1ST HOSP IP/OBS MODERATE 55: CPT

## 2019-10-10 PROCEDURE — 72141 MRI NECK SPINE W/O DYE: CPT | Mod: 26

## 2019-10-10 PROCEDURE — 70551 MRI BRAIN STEM W/O DYE: CPT | Mod: 26

## 2019-10-10 PROCEDURE — 99231 SBSQ HOSP IP/OBS SF/LOW 25: CPT

## 2019-10-10 RX ORDER — INSULIN GLARGINE 100 [IU]/ML
20 INJECTION, SOLUTION SUBCUTANEOUS
Qty: 2 | Refills: 0
Start: 2019-10-10 | End: 2019-11-08

## 2019-10-10 RX ORDER — INSULIN LISPRO 100/ML
8 VIAL (ML) SUBCUTANEOUS
Qty: 1 | Refills: 0
Start: 2019-10-10 | End: 2019-11-08

## 2019-10-10 RX ADMIN — CARBIDOPA AND LEVODOPA 1 TABLET(S): 25; 100 TABLET ORAL at 18:58

## 2019-10-10 RX ADMIN — PANTOPRAZOLE SODIUM 40 MILLIGRAM(S): 20 TABLET, DELAYED RELEASE ORAL at 05:26

## 2019-10-10 RX ADMIN — HEPARIN SODIUM 5000 UNIT(S): 5000 INJECTION INTRAVENOUS; SUBCUTANEOUS at 21:02

## 2019-10-10 RX ADMIN — Medication 100 MILLIGRAM(S): at 11:36

## 2019-10-10 RX ADMIN — DONEPEZIL HYDROCHLORIDE 10 MILLIGRAM(S): 10 TABLET, FILM COATED ORAL at 21:02

## 2019-10-10 RX ADMIN — ESCITALOPRAM OXALATE 20 MILLIGRAM(S): 10 TABLET, FILM COATED ORAL at 11:36

## 2019-10-10 RX ADMIN — HEPARIN SODIUM 5000 UNIT(S): 5000 INJECTION INTRAVENOUS; SUBCUTANEOUS at 13:34

## 2019-10-10 RX ADMIN — CARBIDOPA AND LEVODOPA 1 TABLET(S): 25; 100 TABLET ORAL at 11:36

## 2019-10-10 RX ADMIN — Medication 88 MICROGRAM(S): at 05:26

## 2019-10-10 RX ADMIN — SENNA PLUS 2 TABLET(S): 8.6 TABLET ORAL at 21:02

## 2019-10-10 RX ADMIN — CARBIDOPA AND LEVODOPA 1 TABLET(S): 25; 100 TABLET ORAL at 21:02

## 2019-10-10 RX ADMIN — HEPARIN SODIUM 5000 UNIT(S): 5000 INJECTION INTRAVENOUS; SUBCUTANEOUS at 05:26

## 2019-10-10 RX ADMIN — Medication 650 MILLIGRAM(S): at 05:26

## 2019-10-10 RX ADMIN — SIMVASTATIN 20 MILLIGRAM(S): 20 TABLET, FILM COATED ORAL at 21:01

## 2019-10-10 RX ADMIN — CARBIDOPA AND LEVODOPA 1 TABLET(S): 25; 100 TABLET ORAL at 05:26

## 2019-10-10 NOTE — PROGRESS NOTE ADULT - ASSESSMENT
79y Female presenting as a Trauma Consult , PMHx Parkinsons, Hypothyroid, presents to ED s/p unwitnessed fall at 1130pm, found down by family lying onto her Left side, +HT, ?LOC, -AC, unsure how she fell. Presents w/ L shoulder pain. Pulling 1000 on IS. Downgraded. Pain controlled. Carotid duplex negative.  - Acute displaced midshaft L clavicle fx   - Acute L 3,4,5,6,7 rib fx  - Ascending thoracic aorta aneurysm 4.1cm      PLAN:  -Pain management  -Encourage IS - inspiring 1000 mL  -PT: rehab facility  -OT: return to prior level of independence  -Ortho: out patient follow up with Dr. Pranay Rodriguez 79y Female presenting as a Trauma Consult , PMHx Parkinsons, Hypothyroid, presents to ED s/p unwitnessed fall at 1130pm, found down by family lying onto her Left side, +HT, ?LOC, -AC, unsure how she fell. Presents w/ L shoulder pain. Pulling 1000 on IS. Downgraded. Pain controlled. Carotid duplex negative.  - Acute displaced midshaft L clavicle fx   - Acute L 3,4,5,6,7 rib fx  - Ascending thoracic aorta aneurysm 4.1cm      PLAN:  -Pain management  -Encourage IS - inspiring 1000 mL  -PT: rehab facility  -OT: return to prior level of independence  -Ortho: out patient follow up with Dr. Pranay Rodriguez  -Neurology: EEG negative for epileptiform activity. Neuro will follow.

## 2019-10-10 NOTE — CHART NOTE - NSCHARTNOTEFT_GEN_A_CORE
Called by RN due to change in patient's behavior and possible hallucination. I quickly addressed the patient, and no neurological deficits were identified. Patient is AAOx2, not knowing her current location, which could be contributed to her hospital stay or her history of Parkinsons. We will continue to follow closely.     Trauma Surgery  [phone]

## 2019-10-10 NOTE — PROGRESS NOTE ADULT - SUBJECTIVE AND OBJECTIVE BOX
GENERAL SURGERY PROGRESS NOTE     RODGER REYNA  79y  Female  Hospital day :3d    OVERNIGHT EVENTS: No acute events    T(F): 99.3 (10-10-19 @ 00:30), Max: 99.3 (10-10-19 @ 00:30)  HR: 69 (10-10-19 @ 00:30) (69 - 79)  BP: 155/70 (10-10-19 @ 00:30) (113/74 - 155/70)  RR: 18 (10-10-19 @ 00:30) (18 - 20)        URINE:   10-08-19 @ 07:01  -  10-09-19 @ 07:00  --------------------------------------------------------  OUT: 600 mL       GI proph:  pantoprazole    Tablet 40 milliGRAM(s) Oral before breakfast    AC/ proph: heparin  Injectable 5000 Unit(s) SubCutaneous every 8 hours    ABx:     PHYSICAL EXAM:  GENERAL: NAD, well-appearing  CHEST/LUNG: Clear to auscultation bilaterally. Chest wall tenderness  HEART: Regular rate and rhythm  ABDOMEN: Soft, Nontender, Nondistended;   EXTREMITIES:  No clubbing, cyanosis, or edema. Left arm sling      LABS  Labs:  CAPILLARY BLOOD GLUCOSE                              12.3   9.03  )-----------( 167      ( 08 Oct 2019 20:36 )             38.8         10-08    137  |  98  |  13  ----------------------------<  104<H>  4.3   |  24  |  0.7          LFTs:         Coags:     11.60  ----< 1.01    ( 08 Oct 2019 20:36 )     30.6                        RADIOLOGY & ADDITIONAL TESTS:      ECHO: LVED 60-65%, normal LV function, sclerotic aortic valve with normal function, mild Aortic valve regurg  EEG: generalized slowing moderate - severe

## 2019-10-10 NOTE — CONSULT NOTE ADULT - SUBJECTIVE AND OBJECTIVE BOX
CC: Abnormal EEG      HPI:  80 YO female with parkinson's disease sents to ED s/p fall. She did not recall how she fell. REEG was negative for epileptiform activity and showed generalized slowing.      Social history  Negative x 3      Neuro Exam:  Orientation: oriented to person place and time, speech ,fluent  Cranial Nerves:Intact  Motor:   5/5 throughout no drift               No resting tremors or cogwheeling  Sensory exam: intact   Coordination:. no dysmetria or limb ataxia  Deep tendon reflexes: brisk reflexes throughout  gait: deferred    NIHSS:     Allergies    No Known Allergies    Intolerances      MEDICATIONS  (STANDING):  acetaminophen   Tablet .. 650 milliGRAM(s) Oral every 6 hours  carbidopa/levodopa  25/100 1 Tablet(s) Oral <User Schedule>  carbidopa/levodopa CR 50/200 1 Tablet(s) Oral <User Schedule>  docusate sodium 100 milliGRAM(s) Oral daily  donepezil 10 milliGRAM(s) Oral at bedtime  escitalopram 20 milliGRAM(s) Oral daily  heparin  Injectable 5000 Unit(s) SubCutaneous every 8 hours  influenza   Vaccine 0.5 milliLiter(s) IntraMuscular once  levothyroxine 88 MICROGram(s) Oral daily  pantoprazole    Tablet 40 milliGRAM(s) Oral before breakfast  senna 2 Tablet(s) Oral at bedtime  simvastatin 20 milliGRAM(s) Oral at bedtime    MEDICATIONS  (PRN):  oxyCODONE    IR 5 milliGRAM(s) Oral every 6 hours PRN Moderate Pain (4 - 6)      LABS:                        12.3   9.03  )-----------( 167      ( 08 Oct 2019 20:36 )             38.8     10-08    137  |  98  |  13  ----------------------------<  104<H>  4.3   |  24  |  0.7    Ca    8.9      08 Oct 2019 20:36  Phos  2.7     10-08  Mg     2.1     10-08      PT/INR - ( 08 Oct 2019 20:36 )   PT: 11.60 sec;   INR: 1.01 ratio         PTT - ( 08 Oct 2019 20:36 )  PTT:30.6 sec        Neuro Imaging:  < from: CT Head No Cont (10.07.19 @ 02:04) >  FINDINGS:    The ventricles and cerebral sulci are age-appropriate.    There is no evidence of acute intracranial hemorrhage, mass effect or   midline shift.    Gray-white differentiation is maintained.    There is no fracture to the calvarium. Mastoid air cells are well aerated   bilaterally. The visualized portions of the sinuses are unremarkable.      IMPRESSION:    No CT evidence of acute intracranial pathology.    < end of copied text >    EEG:   < from: EEG (10.08.19 @ 14:00) >  History  Possible Seizures  Syncope, Hypothyroid, Parkinsonian syndrome, Rib fx, Clavicle fx    Medications    Medication  Date  Oxycodone  2019/10/08  Protonix  2019/10/08  Synthroid  2019/10/08  Heparin  2019/10/08  Lexapro  2019/10/08  Colace  2019/10/08  Rocephin  2019/10/08  Sinemet  2019/10/08  Tylenol  2019/10/08    State  Awake and Drowsy    Symmetry  Symmetric    Organization  Less than optimally organized     PDR   Continuous  Background: 6 hz    Generalized Slowing  Yes  moderate - severe    Focal Slowing  No    Breach Artifact:   No      Activation Procedure  Hyper Ventilation  No    Photic Stimulation  No    Epileptiform Activity  No    Events:  No    Impression  Abnormal due to the presence of: generalized slowing as above    Clinical Correlation & Recommendations   Consistent with diffuse cerebral electrophysiological dysfunction   secondary to nonspecific cause.        < end of copied text >      Echo:   Carotid Doppler: N/A  Telemetry: CC: Abnormal EEG      HPI:  80 YO female with parkinson's disease sents to ED s/p fall. She did not recall how she fell. REEG was negative for epileptiform activity and showed generalized slowing.  sees Dr. Gannon as outpt for IPD on Sinemet 25/100 mg TID.  Reports mixed results with sinemet.  Denies any acute neck pain currently.  Has had some cognitive issues recently but able to provide detailed history.  Has had progressive ataxia over the last several weeks to months despite sinemet.  Pt unclear if any recent neuroimaging.  Denies any incontinence.    Social history  Negative x 3    Neuro Exam:  Orientation: oriented to person place and time, speech ,fluent  Cranial Nerves:Intact  Motor:   5/5 throughout no drift               No resting tremors or cogwheeling               Spastic LE > UE with scissoring	  Sensory exam: intact   Coordination:. no dysmetria or limb ataxia  Deep tendon reflexes: brisk reflexes throughout 3+ with upgoing L toe, no clonus, no Hoff.    gait: deferred    NIHSS:     Allergies    No Known Allergies    Intolerances    MEDICATIONS  (STANDING):  acetaminophen   Tablet .. 650 milliGRAM(s) Oral every 6 hours  carbidopa/levodopa  25/100 1 Tablet(s) Oral <User Schedule>  carbidopa/levodopa CR 50/200 1 Tablet(s) Oral <User Schedule>  docusate sodium 100 milliGRAM(s) Oral daily  donepezil 10 milliGRAM(s) Oral at bedtime  escitalopram 20 milliGRAM(s) Oral daily  heparin  Injectable 5000 Unit(s) SubCutaneous every 8 hours  influenza   Vaccine 0.5 milliLiter(s) IntraMuscular once  levothyroxine 88 MICROGram(s) Oral daily  pantoprazole    Tablet 40 milliGRAM(s) Oral before breakfast  senna 2 Tablet(s) Oral at bedtime  simvastatin 20 milliGRAM(s) Oral at bedtime    MEDICATIONS  (PRN):  oxyCODONE    IR 5 milliGRAM(s) Oral every 6 hours PRN Moderate Pain (4 - 6)    LABS:                        12.3   9.03  )-----------( 167      ( 08 Oct 2019 20:36 )             38.8     10-08    137  |  98  |  13  ----------------------------<  104<H>  4.3   |  24  |  0.7    Ca    8.9      08 Oct 2019 20:36  Phos  2.7     10-08  Mg     2.1     10-08      PT/INR - ( 08 Oct 2019 20:36 )   PT: 11.60 sec;   INR: 1.01 ratio         PTT - ( 08 Oct 2019 20:36 )  PTT:30.6 sec      Neuro Imaging:  < from: CT Head No Cont (10.07.19 @ 02:04) >  FINDINGS:    The ventricles and cerebral sulci are age-appropriate.    There is no evidence of acute intracranial hemorrhage, mass effect or   midline shift.    Gray-white differentiation is maintained.    There is no fracture to the calvarium. Mastoid air cells are well aerated   bilaterally. The visualized portions of the sinuses are unremarkable.      IMPRESSION:    No CT evidence of acute intracranial pathology.    < end of copied text >    EEG:   < from: EEG (10.08.19 @ 14:00) >  History  Possible Seizures  Syncope, Hypothyroid, Parkinsonian syndrome, Rib fx, Clavicle fx    Medications    Medication  Date  Oxycodone  2019/10/08  Protonix  2019/10/08  Synthroid  2019/10/08  Heparin  2019/10/08  Lexapro  2019/10/08  Colace  2019/10/08  Rocephin  2019/10/08  Sinemet  2019/10/08  Tylenol  2019/10/08    State  Awake and Drowsy    Symmetry  Symmetric    Organization  Less than optimally organized     PDR   Continuous  Background: 6 hz    Generalized Slowing  Yes  moderate - severe    Focal Slowing  No    Breach Artifact:   No      Activation Procedure  Hyper Ventilation  No    Photic Stimulation  No    Epileptiform Activity  No    Events:  No    Impression  Abnormal due to the presence of: generalized slowing as above    Clinical Correlation & Recommendations   Consistent with diffuse cerebral electrophysiological dysfunction   secondary to nonspecific cause.        < end of copied text >      Echo:   Carotid Doppler: N/A  Telemetry:

## 2019-10-10 NOTE — CONSULT NOTE ADULT - ASSESSMENT
78 YO female with parkinson's disease presented to ED s/p fall. She did not recall how she fell.  REEG was negative for epileptiform activity and showed generalized slowing.     Neuro attending note will follow 80 YO female with parkinson's disease presented to ED s/p fall. She did not recall how she fell.  REEG was negative for epileptiform activity and showed generalized slowing. Has evidence of myelopathy on exam with increased tone and hyperreflexia.  r/o upper motor process.      Plan:  - MRI Brain and Cervical Spine w/w/o malcom  - Sinemet IR 25/100 mg PO TID (home dose)  - Sinemet CR 50/200 mg PO QHS   - PT eval and treat pending MRI

## 2019-10-10 NOTE — CHART NOTE - NSCHARTNOTEFT_GEN_A_CORE
SURGERY FLOOR TRANSFER NOTE TO MEDICINE    SUBJECTIVE: 79 year old female pmhx as below presented s/p fall sustaining left clavicle fracture and left 3-7 rib fractures, monitored in SICU for respiratory status and pain control, downgraded, non-weightbearing in sling LUE, per ortho nonop treatment and outpatient follow up. Patient treated for UTI on this admission. KALPANA resolved. Syncope workup done, EEG showed slowing, upon neurology exam some deficits noted, MRIs ordered. Patient transferred to medical service for further neurology work up.     PMHX  Hypothyroid  Parkinsonian syndrome    No Known Allergies    carbidopa/levodopa  25/100 1 Tablet(s) Oral <User Schedule>  carbidopa/levodopa CR 50/200 1 Tablet(s) Oral <User Schedule>  docusate sodium 100 milliGRAM(s) Oral daily  donepezil 10 milliGRAM(s) Oral at bedtime  escitalopram 20 milliGRAM(s) Oral daily  heparin  Injectable 5000 Unit(s) SubCutaneous every 8 hours  influenza   Vaccine 0.5 milliLiter(s) IntraMuscular once  levothyroxine 88 MICROGram(s) Oral daily  oxyCODONE    IR 5 milliGRAM(s) Oral every 6 hours PRN  pantoprazole    Tablet 40 milliGRAM(s) Oral before breakfast  senna 2 Tablet(s) Oral at bedtime  simvastatin 20 milliGRAM(s) Oral at bedtime      OBJECTIVE:  Vital Signs  T(C): 36.6 (10 Oct 2019 07:30), Max: 37.4 (10 Oct 2019 00:30)  T(F): 97.9 (10 Oct 2019 07:30), Max: 99.3 (10 Oct 2019 00:30)  HR: 71 (10 Oct 2019 07:30) (69 - 71)  BP: 167/74 (10 Oct 2019 07:30) (155/70 - 167/74)  RR: 19 (10 Oct 2019 07:30) (18 - 19)        I&O's Summary    09 Oct 2019 07:01  -  10 Oct 2019 07:00  --------------------------------------------------------  IN: 320 mL / OUT: 1500 mL / NET: -1180 mL    10 Oct 2019 07:01  -  10 Oct 2019 16:07  --------------------------------------------------------  IN: 320 mL / OUT: 300 mL / NET: 20 mL                              12.3   9.03  )-----------( 167      ( 08 Oct 2019 20:36 )             38.8       10-08    137  |  98  |  13  ----------------------------<  104<H>  4.3   |  24  |  0.7    Ca    8.9      08 Oct 2019 20:36  Phos  2.7     10-08  Mg     2.1     10-08        MEDICATIONS  (STANDING):  carbidopa/levodopa  25/100 1 Tablet(s) Oral <User Schedule>  carbidopa/levodopa CR 50/200 1 Tablet(s) Oral <User Schedule>  docusate sodium 100 milliGRAM(s) Oral daily  donepezil 10 milliGRAM(s) Oral at bedtime  escitalopram 20 milliGRAM(s) Oral daily  heparin  Injectable 5000 Unit(s) SubCutaneous every 8 hours  influenza   Vaccine 0.5 milliLiter(s) IntraMuscular once  levothyroxine 88 MICROGram(s) Oral daily  pantoprazole    Tablet 40 milliGRAM(s) Oral before breakfast  senna 2 Tablet(s) Oral at bedtime  simvastatin 20 milliGRAM(s) Oral at bedtime    MEDICATIONS  (PRN):  oxyCODONE    IR 5 milliGRAM(s) Oral every 6 hours PRN Moderate Pain (4 - 6)      Basic Metabolic Panel: 20:00 (10-10-19 @ 13:25)  Complete Blood Count + Automated Diff: 20:00 (10-10-19 @ 13:25)  Magnesium, Serum: 20:00 (10-10-19 @ 13:25)  Phosphorus Level, Serum: 20:00 (10-10-19 @ 13:25)      A/P: 79 year old female pmhx as below presented s/p fall sustaining left clavicle fracture and left 3-7 rib fractures, neuro deficits on exam    -GABREILA SALDANA in sling, outpatient follow up with Dr. Rodriguez  -Pain control for rib fractures, continue incentive spirometer  -MRI brain and cspine pending  -AM labs pending  -follow up with [facility] after work up for possible rehab admission    Patient accepted to medical service by Dr. Cota    Sign out given to Dr. Zabala (spectra [phone])

## 2019-10-11 LAB
ANION GAP SERPL CALC-SCNC: 8 MMOL/L — SIGNIFICANT CHANGE UP (ref 7–14)
BASOPHILS # BLD AUTO: 0.06 K/UL — SIGNIFICANT CHANGE UP (ref 0–0.2)
BASOPHILS NFR BLD AUTO: 0.7 % — SIGNIFICANT CHANGE UP (ref 0–1)
BUN SERPL-MCNC: 11 MG/DL — SIGNIFICANT CHANGE UP (ref 10–20)
CALCIUM SERPL-MCNC: 8.5 MG/DL — SIGNIFICANT CHANGE UP (ref 8.5–10.1)
CHLORIDE SERPL-SCNC: 103 MMOL/L — SIGNIFICANT CHANGE UP (ref 98–110)
CO2 SERPL-SCNC: 30 MMOL/L — SIGNIFICANT CHANGE UP (ref 17–32)
CREAT SERPL-MCNC: 0.7 MG/DL — SIGNIFICANT CHANGE UP (ref 0.7–1.5)
EOSINOPHIL # BLD AUTO: 0.24 K/UL — SIGNIFICANT CHANGE UP (ref 0–0.7)
EOSINOPHIL NFR BLD AUTO: 2.9 % — SIGNIFICANT CHANGE UP (ref 0–8)
GLUCOSE SERPL-MCNC: 96 MG/DL — SIGNIFICANT CHANGE UP (ref 70–99)
HCT VFR BLD CALC: 35.9 % — LOW (ref 37–47)
HGB BLD-MCNC: 11.5 G/DL — LOW (ref 12–16)
IMM GRANULOCYTES NFR BLD AUTO: 0.4 % — HIGH (ref 0.1–0.3)
LYMPHOCYTES # BLD AUTO: 1.65 K/UL — SIGNIFICANT CHANGE UP (ref 1.2–3.4)
LYMPHOCYTES # BLD AUTO: 19.6 % — LOW (ref 20.5–51.1)
MAGNESIUM SERPL-MCNC: 2.2 MG/DL — SIGNIFICANT CHANGE UP (ref 1.8–2.4)
MCHC RBC-ENTMCNC: 30.5 PG — SIGNIFICANT CHANGE UP (ref 27–31)
MCHC RBC-ENTMCNC: 32 G/DL — SIGNIFICANT CHANGE UP (ref 32–37)
MCV RBC AUTO: 95.2 FL — SIGNIFICANT CHANGE UP (ref 81–99)
MONOCYTES # BLD AUTO: 0.88 K/UL — HIGH (ref 0.1–0.6)
MONOCYTES NFR BLD AUTO: 10.5 % — HIGH (ref 1.7–9.3)
NEUTROPHILS # BLD AUTO: 5.55 K/UL — SIGNIFICANT CHANGE UP (ref 1.4–6.5)
NEUTROPHILS NFR BLD AUTO: 65.9 % — SIGNIFICANT CHANGE UP (ref 42.2–75.2)
NRBC # BLD: 0 /100 WBCS — SIGNIFICANT CHANGE UP (ref 0–0)
PHOSPHATE SERPL-MCNC: 2.6 MG/DL — SIGNIFICANT CHANGE UP (ref 2.1–4.9)
PLATELET # BLD AUTO: 173 K/UL — SIGNIFICANT CHANGE UP (ref 130–400)
POTASSIUM SERPL-MCNC: 3.9 MMOL/L — SIGNIFICANT CHANGE UP (ref 3.5–5)
POTASSIUM SERPL-SCNC: 3.9 MMOL/L — SIGNIFICANT CHANGE UP (ref 3.5–5)
RBC # BLD: 3.77 M/UL — LOW (ref 4.2–5.4)
RBC # FLD: 13 % — SIGNIFICANT CHANGE UP (ref 11.5–14.5)
SODIUM SERPL-SCNC: 141 MMOL/L — SIGNIFICANT CHANGE UP (ref 135–146)
WBC # BLD: 8.41 K/UL — SIGNIFICANT CHANGE UP (ref 4.8–10.8)
WBC # FLD AUTO: 8.41 K/UL — SIGNIFICANT CHANGE UP (ref 4.8–10.8)

## 2019-10-11 PROCEDURE — 99233 SBSQ HOSP IP/OBS HIGH 50: CPT

## 2019-10-11 PROCEDURE — 99232 SBSQ HOSP IP/OBS MODERATE 35: CPT

## 2019-10-11 RX ADMIN — OXYCODONE HYDROCHLORIDE 5 MILLIGRAM(S): 5 TABLET ORAL at 14:02

## 2019-10-11 RX ADMIN — HEPARIN SODIUM 5000 UNIT(S): 5000 INJECTION INTRAVENOUS; SUBCUTANEOUS at 05:10

## 2019-10-11 RX ADMIN — SENNA PLUS 2 TABLET(S): 8.6 TABLET ORAL at 21:19

## 2019-10-11 RX ADMIN — HEPARIN SODIUM 5000 UNIT(S): 5000 INJECTION INTRAVENOUS; SUBCUTANEOUS at 21:19

## 2019-10-11 RX ADMIN — DONEPEZIL HYDROCHLORIDE 10 MILLIGRAM(S): 10 TABLET, FILM COATED ORAL at 21:19

## 2019-10-11 RX ADMIN — ESCITALOPRAM OXALATE 20 MILLIGRAM(S): 10 TABLET, FILM COATED ORAL at 13:31

## 2019-10-11 RX ADMIN — CARBIDOPA AND LEVODOPA 1 TABLET(S): 25; 100 TABLET ORAL at 13:30

## 2019-10-11 RX ADMIN — Medication 88 MICROGRAM(S): at 05:10

## 2019-10-11 RX ADMIN — OXYCODONE HYDROCHLORIDE 5 MILLIGRAM(S): 5 TABLET ORAL at 13:28

## 2019-10-11 RX ADMIN — SIMVASTATIN 20 MILLIGRAM(S): 20 TABLET, FILM COATED ORAL at 21:19

## 2019-10-11 RX ADMIN — CARBIDOPA AND LEVODOPA 1 TABLET(S): 25; 100 TABLET ORAL at 05:10

## 2019-10-11 RX ADMIN — Medication 100 MILLIGRAM(S): at 13:31

## 2019-10-11 RX ADMIN — PANTOPRAZOLE SODIUM 40 MILLIGRAM(S): 20 TABLET, DELAYED RELEASE ORAL at 05:10

## 2019-10-11 RX ADMIN — CARBIDOPA AND LEVODOPA 1 TABLET(S): 25; 100 TABLET ORAL at 17:06

## 2019-10-11 RX ADMIN — HEPARIN SODIUM 5000 UNIT(S): 5000 INJECTION INTRAVENOUS; SUBCUTANEOUS at 13:31

## 2019-10-11 RX ADMIN — CARBIDOPA AND LEVODOPA 1 TABLET(S): 25; 100 TABLET ORAL at 21:19

## 2019-10-11 NOTE — PROGRESS NOTE ADULT - SUBJECTIVE AND OBJECTIVE BOX
Neurology Progress Note    Interval History:      HPI:  79F, PMHx Parkinsons, Hypothyroid, presents to ED s/p fall. As per family, they heard the pt fall around 1130pm, pt was found down lying onto her left side, +HT, ?LOC, -AC. Pt does not recall how she fell. Pt presents w/ Left shoulder pain. Otherwise no HA, no CP, no SOB, no abdominal pain, no dysuria. (07 Oct 2019 04:27)      PAST MEDICAL & SURGICAL HISTORY:  Hypothyroid  Parkinsonian syndrome          Medications:  carbidopa/levodopa  25/100 1 Tablet(s) Oral <User Schedule>  carbidopa/levodopa CR 50/200 1 Tablet(s) Oral <User Schedule>  docusate sodium 100 milliGRAM(s) Oral daily  donepezil 10 milliGRAM(s) Oral at bedtime  escitalopram 20 milliGRAM(s) Oral daily  heparin  Injectable 5000 Unit(s) SubCutaneous every 8 hours  influenza   Vaccine 0.5 milliLiter(s) IntraMuscular once  levothyroxine 88 MICROGram(s) Oral daily  oxyCODONE    IR 5 milliGRAM(s) Oral every 6 hours PRN  pantoprazole    Tablet 40 milliGRAM(s) Oral before breakfast  senna 2 Tablet(s) Oral at bedtime  simvastatin 20 milliGRAM(s) Oral at bedtime      Vital Signs Last 24 Hrs  T(C): 37.1 (11 Oct 2019 07:44), Max: 37.1 (10 Oct 2019 23:00)  T(F): 98.7 (11 Oct 2019 07:44), Max: 98.8 (10 Oct 2019 23:00)  HR: 68 (11 Oct 2019 07:44) (64 - 79)  BP: 153/70 (11 Oct 2019 07:44) (130/62 - 154/74)  BP(mean): --  RR: 18 (11 Oct 2019 07:44) (18 - 18)  SpO2: --    Neurological Exam:   Mental status: Awake, alert and oriented x3. Naming, repetition and comprehension intact.  Attention/concentration intact.  No dysarthria, no aphasia.  Fund of knowledge appropriate.    Cranial nerves: Pupils equally round and reactive to light, extraocular muscles intact, face symmetric, hearing intact to finger rub, tongue was midline.  Motor:  MRC grading 5/5 R UE/LE and L LE (Left UE limited due to pain).   strength 5/5. Increased tone in lower extremities. No abnormal movements.    Sensation: Intact.   Reflexes: 3+ in R UE/LE and L LE (Left UE limited due to pain), downgoing toes bilaterally.      Labs:  CBC Full  -  ( 11 Oct 2019 06:28 )  WBC Count : 8.41 K/uL  RBC Count : 3.77 M/uL  Hemoglobin : 11.5 g/dL  Hematocrit : 35.9 %  Platelet Count - Automated : 173 K/uL  Mean Cell Volume : 95.2 fL  Mean Cell Hemoglobin : 30.5 pg  Mean Cell Hemoglobin Concentration : 32.0 g/dL  Auto Neutrophil # : 5.55 K/uL  Auto Lymphocyte # : 1.65 K/uL  Auto Monocyte # : 0.88 K/uL  Auto Eosinophil # : 0.24 K/uL  Auto Basophil # : 0.06 K/uL  Auto Neutrophil % : 65.9 %  Auto Lymphocyte % : 19.6 %  Auto Monocyte % : 10.5 %  Auto Eosinophil % : 2.9 %  Auto Basophil % : 0.7 %    10-11    141  |  103  |  11  ----------------------------<  96  3.9   |  30  |  0.7    Ca    8.5      11 Oct 2019 06:28  Phos  2.6     10-11  Mg     2.2     10-11 Neurology Progress Note    Interval History:  Pt stable.  No new deficits overnight.  Per family has had progressive decline in ambulation over the last several months with recurrent falls.     HPI:  79F, PMHx Parkinsons, Hypothyroid, presents to ED s/p fall. As per family, they heard the pt fall around 1130pm, pt was found down lying onto her left side, +HT, ?LOC, -AC. Pt does not recall how she fell. Pt presents w/ Left shoulder pain. Otherwise no HA, no CP, no SOB, no abdominal pain, no dysuria. (07 Oct 2019 04:27)      PAST MEDICAL & SURGICAL HISTORY:  Hypothyroid  Parkinsonian syndrome    Medications:  carbidopa/levodopa  25/100 1 Tablet(s) Oral <User Schedule>  carbidopa/levodopa CR 50/200 1 Tablet(s) Oral <User Schedule>  docusate sodium 100 milliGRAM(s) Oral daily  donepezil 10 milliGRAM(s) Oral at bedtime  escitalopram 20 milliGRAM(s) Oral daily  heparin  Injectable 5000 Unit(s) SubCutaneous every 8 hours  influenza   Vaccine 0.5 milliLiter(s) IntraMuscular once  levothyroxine 88 MICROGram(s) Oral daily  oxyCODONE    IR 5 milliGRAM(s) Oral every 6 hours PRN  pantoprazole    Tablet 40 milliGRAM(s) Oral before breakfast  senna 2 Tablet(s) Oral at bedtime  simvastatin 20 milliGRAM(s) Oral at bedtime      Vital Signs Last 24 Hrs  T(C): 37.1 (11 Oct 2019 07:44), Max: 37.1 (10 Oct 2019 23:00)  T(F): 98.7 (11 Oct 2019 07:44), Max: 98.8 (10 Oct 2019 23:00)  HR: 68 (11 Oct 2019 07:44) (64 - 79)  BP: 153/70 (11 Oct 2019 07:44) (130/62 - 154/74)  BP(mean): --  RR: 18 (11 Oct 2019 07:44) (18 - 18)  SpO2: --    Neurological Exam:   Mental status: Awake, alert and oriented x3. Naming, repetition and comprehension intact.  Attention/concentration intact.  No dysarthria, no aphasia.  Fund of knowledge appropriate.    Cranial nerves: Pupils equally round and reactive to light, extraocular muscles intact, face symmetric, hearing intact to finger rub, tongue was midline.  Motor:  MRC grading 5/5 R UE/LE and L LE (Left UE limited due to pain).   strength 5/5. Increased tone in lower extremities. No abnormal movements.    Sensation: Intact.   Reflexes: 3+ in R UE/LE and L LE (Left UE limited due to pain), downgoing toes bilaterally.      Labs:  CBC Full  -  ( 11 Oct 2019 06:28 )  WBC Count : 8.41 K/uL  RBC Count : 3.77 M/uL  Hemoglobin : 11.5 g/dL  Hematocrit : 35.9 %  Platelet Count - Automated : 173 K/uL  Mean Cell Volume : 95.2 fL  Mean Cell Hemoglobin : 30.5 pg  Mean Cell Hemoglobin Concentration : 32.0 g/dL  Auto Neutrophil # : 5.55 K/uL  Auto Lymphocyte # : 1.65 K/uL  Auto Monocyte # : 0.88 K/uL  Auto Eosinophil # : 0.24 K/uL  Auto Basophil # : 0.06 K/uL  Auto Neutrophil % : 65.9 %  Auto Lymphocyte % : 19.6 %  Auto Monocyte % : 10.5 %  Auto Eosinophil % : 2.9 %  Auto Basophil % : 0.7 %    10-11    141  |  103  |  11  ----------------------------<  96  3.9   |  30  |  0.7    Ca    8.5      11 Oct 2019 06:28  Phos  2.6     10-11  Mg     2.2     10-11

## 2019-10-11 NOTE — PROGRESS NOTE ADULT - ASSESSMENT
Patient is a 79y old  Female who presents with a chief complaint of Fall (11 Oct 2019 11:33)    #Fall secondary to suspicion for syncope (unable to recall event and found on floor)   neurology following     #Spinal stenosis   neurosurgery consult     #CKD 2 stable , creatinine at 0.7     #Parkinson's disease on sinemet    #Hypothyroid on synthroid    #Dyslipidemia on zocor     #mild aortic regurgitaion stable     #Anemia no indication for transfusion     #Acute displaced midshaft fracture of the left clavicle    #Left sided rib fracture no acute intervention    Progress Note Handoff    Pending:  neurosurgery eval  Family discussion: patient is of sound mind and verbalizes understanding , family aware of plan of care    Disposition: [facility]

## 2019-10-11 NOTE — PROGRESS NOTE ADULT - ASSESSMENT
Assessment: 79 year old female with history of parkinson's disease and hypothyroidism presented to ED s/p fall. She did not recall how she fell but denies loss of consciousness.  REEG was negative for epileptiform activity and showed generalized slowing. MRI Cervical Spine revealed severe spinal canal stenosis without cord compression at C6-C7 and osteoarthritic changes. MRI Brain revealed no evidence of acute intracranial pathology but does show mild chronic microvascular ischemic changes. Her neurological examination is unremarkable except for hypertonicity and hyperreflexia with negative Babinsky.    Plan: Will discuss with attending Assessment: 79 year old female with history of parkinson's disease and hypothyroidism presented to ED s/p fall. She did not recall how she fell but denies loss of consciousness.  REEG was negative for epileptiform activity and showed generalized slowing. MRI Cervical Spine revealed severe spinal canal stenosis without cord compression at C6-C7 and osteoarthritic changes. MRI Brain revealed no evidence of acute intracranial pathology but does show mild chronic microvascular ischemic changes. Her neurological examination is unremarkable except for hypertonicity and hyperreflexia with negative Babinsky. Symptoms likely due to worsening Parkinson's Disease.    Plan:   Increase Levodopa/Carbidopa dose as follows for Parkinson's control  Tablet Sinemet 25/100mg 4 times daily per oral at 8 am, 12 pm, 4 pm, and 8 pm  Tablet Sinemet CR 50/200mg once daily at 12 am midnight. Assessment: 79 year old female with history of parkinson's disease and hypothyroidism presented to ED s/p fall. She did not recall how she fell but denies loss of consciousness.  REEG was negative for epileptiform activity and showed generalized slowing. MRI Cervical Spine revealed severe spinal canal stenosis without cord compression at C6-C7 and osteoarthritic changes. MRI Brain revealed no evidence of acute intracranial pathology but does show mild chronic microvascular ischemic changes. Her neurological examination is unremarkable except for hypertonicity and hyperreflexia with negative Babinsky. Symptoms likely due to worsening Parkinson's Disease.    Plan:   Increase Levodopa/Carbidopa dose as follows for Parkinson's control  Tablet Sinemet 25/100mg 4 times daily per oral at 8 am, 12 pm, 4 pm, and 8 pm  Tablet Sinemet CR 50/200mg once daily at 12 am midnight.  f/u with Dr. Gannon in 2 - 4 wks after discharge

## 2019-10-11 NOTE — PROGRESS NOTE ADULT - SUBJECTIVE AND OBJECTIVE BOX
[facility]YNA  79[id number]      Patient is a 79y old  Female who presents with a chief complaint of Fall (11 Oct 2019 11:33)      INTERVAL HPI/OVERNIGHT EVENTS:    no over night events, sitting in chair    REVIEW OF SYSTEMS:  CONSTITUTIONAL: No fever, weight loss, or fatigue  EYES: No eye pain, visual disturbances, or discharge  ENMT:  No difficulty hearing, tinnitus, vertigo; No sinus or throat pain  NECK: No pain or stiffness  BREASTS: No pain, masses, or nipple discharge  RESPIRATORY: No cough, wheezing, chills or hemoptysis; No shortness of breath  CARDIOVASCULAR: No chest pain, palpitations, dizziness, or leg swelling  GASTROINTESTINAL: No abdominal or epigastric pain. No nausea, vomiting, or hematemesis; No diarrhea or constipation. No melena or hematochezia.  GENITOURINARY: No dysuria, frequency, hematuria, or incontinence  NEUROLOGICAL: No headaches, memory loss, loss of strength, numbness, or tremors  SKIN: No itching, burning, rashes, or lesions   LYMPH NODES: No enlarged glands  ENDOCRINE: No heat or cold intolerance; No hair loss  MUSCULOSKELETAL: left side pain , including lue  PSYCHIATRIC: No depression, anxiety, mood swings, or difficulty sleeping  HEME/LYMPH: No easy bruising, or bleeding gums  ALLERY AND IMMUNOLOGIC: No hives or eczema  FAMILY HISTORY:    T(C): 37.1 (10-11-19 @ 07:44), Max: 37.1 (10-10-19 @ 23:00)  HR: 68 (10-11-19 @ 07:44) (64 - 79)  BP: 153/70 (10-11-19 @ 07:44) (130/62 - 154/74)  RR: 18 (10-11-19 @ 07:44) (18 - 18)  SpO2: --  Wt(kg): --Vital Signs Last 24 Hrs  T(C): 37.1 (11 Oct 2019 07:44), Max: 37.1 (10 Oct 2019 23:00)  T(F): 98.7 (11 Oct 2019 07:44), Max: 98.8 (10 Oct 2019 23:00)  HR: 68 (11 Oct 2019 07:44) (64 - 79)  BP: 153/70 (11 Oct 2019 07:44) (130/62 - 154/74)  BP(mean): --  RR: 18 (11 Oct 2019 07:44) (18 - 18)  SpO2: --    PHYSICAL EXAM:  GENERAL: NAD,   HEAD:  Atraumatic, Normocephalic  EYES: EOMI, PERRLA, conjunctiva and sclera clear  ENMT: No tonsillar erythema, exudates, or enlargement; Moist mucous membranes, Good dentition, No lesions  NECK: Supple, No JVD, Normal thyroid  NERVOUS SYSTEM:   Motor Strength 5/5 B/L upper and lower extremities; hypereflexia  PULM: Clear to auscultation bilaterally  CARDIAC: Regular rate and rhythm; No murmurs, rubs, or gallops  GI: Soft, Nontender, Nondistended; Bowel sounds present  EXTREMITIES:  2+ Peripheral Pulses, No clubbing, cyanosis, or edema, lue in sling  LYMPH: No lymphadenopathy noted  SKIN: No rashes or lesions      LABS:                            11.5   8.41  )-----------( 173      ( 11 Oct 2019 06:28 )             35.9   10-11    141  |  103  |  11  ----------------------------<  96  3.9   |  30  |  0.7    Ca    8.5      11 Oct 2019 06:28  Phos  2.6     10-11  Mg     2.2     10-11              carbidopa/levodopa  25/100 1 Tablet(s) Oral <User Schedule>  carbidopa/levodopa CR 50/200 1 Tablet(s) Oral <User Schedule>  docusate sodium 100 milliGRAM(s) Oral daily  donepezil 10 milliGRAM(s) Oral at bedtime  escitalopram 20 milliGRAM(s) Oral daily  heparin  Injectable 5000 Unit(s) SubCutaneous every 8 hours  influenza   Vaccine 0.5 milliLiter(s) IntraMuscular once  levothyroxine 88 MICROGram(s) Oral daily  oxyCODONE    IR 5 milliGRAM(s) Oral every 6 hours PRN  pantoprazole    Tablet 40 milliGRAM(s) Oral before breakfast  senna 2 Tablet(s) Oral at bedtime  simvastatin 20 milliGRAM(s) Oral at bedtime      HEALTH ISSUES - PROBLEM Dx:          Case Discussed with House Staff     Spectra [phone]

## 2019-10-12 PROCEDURE — 99232 SBSQ HOSP IP/OBS MODERATE 35: CPT

## 2019-10-12 RX ADMIN — ESCITALOPRAM OXALATE 20 MILLIGRAM(S): 10 TABLET, FILM COATED ORAL at 11:17

## 2019-10-12 RX ADMIN — Medication 88 MICROGRAM(S): at 05:30

## 2019-10-12 RX ADMIN — HEPARIN SODIUM 5000 UNIT(S): 5000 INJECTION INTRAVENOUS; SUBCUTANEOUS at 05:30

## 2019-10-12 RX ADMIN — HEPARIN SODIUM 5000 UNIT(S): 5000 INJECTION INTRAVENOUS; SUBCUTANEOUS at 21:43

## 2019-10-12 RX ADMIN — OXYCODONE HYDROCHLORIDE 5 MILLIGRAM(S): 5 TABLET ORAL at 14:57

## 2019-10-12 RX ADMIN — SIMVASTATIN 20 MILLIGRAM(S): 20 TABLET, FILM COATED ORAL at 21:43

## 2019-10-12 RX ADMIN — PANTOPRAZOLE SODIUM 40 MILLIGRAM(S): 20 TABLET, DELAYED RELEASE ORAL at 05:30

## 2019-10-12 RX ADMIN — OXYCODONE HYDROCHLORIDE 5 MILLIGRAM(S): 5 TABLET ORAL at 08:33

## 2019-10-12 RX ADMIN — CARBIDOPA AND LEVODOPA 1 TABLET(S): 25; 100 TABLET ORAL at 05:30

## 2019-10-12 RX ADMIN — CARBIDOPA AND LEVODOPA 1 TABLET(S): 25; 100 TABLET ORAL at 11:17

## 2019-10-12 RX ADMIN — INFLUENZA VIRUS VACCINE 0.5 MILLILITER(S): 15; 15; 15; 15 SUSPENSION INTRAMUSCULAR at 11:19

## 2019-10-12 RX ADMIN — Medication 100 MILLIGRAM(S): at 11:17

## 2019-10-12 RX ADMIN — CARBIDOPA AND LEVODOPA 1 TABLET(S): 25; 100 TABLET ORAL at 17:22

## 2019-10-12 RX ADMIN — CARBIDOPA AND LEVODOPA 1 TABLET(S): 25; 100 TABLET ORAL at 21:43

## 2019-10-12 RX ADMIN — HEPARIN SODIUM 5000 UNIT(S): 5000 INJECTION INTRAVENOUS; SUBCUTANEOUS at 14:19

## 2019-10-12 RX ADMIN — OXYCODONE HYDROCHLORIDE 5 MILLIGRAM(S): 5 TABLET ORAL at 21:42

## 2019-10-12 RX ADMIN — SENNA PLUS 2 TABLET(S): 8.6 TABLET ORAL at 21:43

## 2019-10-12 RX ADMIN — DONEPEZIL HYDROCHLORIDE 10 MILLIGRAM(S): 10 TABLET, FILM COATED ORAL at 21:43

## 2019-10-12 RX ADMIN — OXYCODONE HYDROCHLORIDE 5 MILLIGRAM(S): 5 TABLET ORAL at 09:03

## 2019-10-12 RX ADMIN — Medication 10 MILLIGRAM(S): at 19:42

## 2019-10-12 NOTE — PROGRESS NOTE ADULT - ASSESSMENT
Patient is a 79y old  Female who presents with a chief complaint of Fall (11 Oct 2019 11:33)    #Fall secondary to suspicion for syncope (unable to recall event and found on floor)   neurology following  no further intervnetion    #Spinal stenosis   neurosurgery consult  still pending ,     #CKD 2 stable    #Parkinson's disease on sinemet    #Hypothyroid on synthroid    #Dyslipidemia on zocor     #mild aortic regurgitaion stable     #Anemia no indication for transfusion     #Acute displaced midshaft fracture of the left clavicle    #Left sided rib fracture no acute intervention    Progress Note Handoff    Pending:  if no neurosurgery intervention , xfer to 4a  Family discussion: patient is of sound mind and verbalizes understanding , family aware of plan of care    Disposition: 4a

## 2019-10-12 NOTE — CONSULT NOTE ADULT - ATTENDING COMMENTS
As above.  Although there is spinal canal stenosis, there is no spinal cord compression.  Patient is asymptomatic.  No acute neurosurgical intervention. May follow up as outpatient if desired.
agree with above  seen and examined  no acute ortho intervention  sling and nwb lue  can fu as outpatient  cont care per medicine
I have personally seen and examined this patient.  I have fully participated in the care of this patient.  I have reviewed all pertinent clinical information, influding history, physical exam, plan and note.   I have reviewed all pertinent clinical information and reviewed all relevant imaging and diagnostic studies personally.  80 yo w/ h/o IPD currently admitted for mechanical fall with increase ataxia with findings c/w myelopathy r/o upper motor process.  Recommendations as above.

## 2019-10-12 NOTE — PROGRESS NOTE ADULT - SUBJECTIVE AND OBJECTIVE BOX
[facility]ODGER REYNA  79[facility]  [id number]      Patient is a 79y old  Female who presents with a chief complaint of Fall (11 Oct 2019 11:33)      INTERVAL HPI/OVERNIGHT EVENTS:    no acute events overnight    REVIEW OF SYSTEMS:  CONSTITUTIONAL: No fever, weight loss, or fatigue  EYES: No eye pain, visual disturbances, or discharge  ENMT:  No difficulty hearing, tinnitus, vertigo; No sinus or throat pain  NECK: No pain or stiffness  BREASTS: No pain, masses, or nipple discharge  RESPIRATORY: No cough, wheezing, chills or hemoptysis; No shortness of breath  CARDIOVASCULAR: No chest pain, palpitations, dizziness, or leg swelling  GASTROINTESTINAL: No abdominal or epigastric pain. No nausea, vomiting, or hematemesis; No diarrhea or constipation. No melena or hematochezia.  GENITOURINARY: No dysuria, frequency, hematuria, or incontinence  NEUROLOGICAL: No headaches, memory loss, loss of strength, numbness, or tremors  SKIN: No itching, burning, rashes, or lesions   LYMPH NODES: No enlarged glands  ENDOCRINE: No heat or cold intolerance; No hair loss  MUSCULOSKELETAL: left sided pain , controlled  PSYCHIATRIC: No depression, anxiety, mood swings, or difficulty sleeping  HEME/LYMPH: No easy bruising, or bleeding gums  ALLERY AND IMMUNOLOGIC: No hives or eczema  FAMILY HISTORY:    T(C): 36.1 (10-12-19 @ 07:26), Max: 36.9 (10-12-19 @ 00:06)  HR: 73 (10-12-19 @ 07:26) (73 - 77)  BP: 177/79 (10-12-19 @ 07:26) (153/65 - 177/79)  RR: 18 (10-12-19 @ 07:26) (18 - 18)  SpO2: --  Wt(kg): --Vital Signs Last 24 Hrs  T(C): 36.1 (12 Oct 2019 07:26), Max: 36.9 (12 Oct 2019 00:06)  T(F): 97 (12 Oct 2019 07:26), Max: 98.5 (12 Oct 2019 00:06)  HR: 73 (12 Oct 2019 07:26) (73 - 77)  BP: 177/79 (12 Oct 2019 07:26) (153/65 - 177/79)  BP(mean): --  RR: 18 (12 Oct 2019 07:26) (18 - 18)  SpO2: --    PHYSICAL EXAM:  GENERAL: NAD,   HEAD:  Atraumatic, Normocephalic  EYES: EOMI, PERRLA, conjunctiva and sclera clear  ENMT: No tonsillar erythema, exudates, or enlargement; Moist mucous membranes, Good dentition, No lesions  NECK: Supple, No JVD, Normal thyroid  NERVOUS SYSTEM:   Motor Strength 5/5 B/L upper and lower extremities;   PULM: Clear to auscultation bilaterally  CARDIAC: Regular rate and rhythm; No murmurs, rubs, or gallops  GI: Soft, Nontender, Nondistended; Bowel sounds present  EXTREMITIES:  2+ Peripheral Pulses, No clubbing, cyanosis, or edema, lue in sling  LYMPH: No lymphadenopathy noted  SKIN: No rashes or lesions  LABS:                            11.5   8.41  )-----------( 173      ( 11 Oct 2019 06:28 )             35.9   10-11    141  |  103  |  11  ----------------------------<  96  3.9   |  30  |  0.7    Ca    8.5      11 Oct 2019 06:28  Phos  2.6     10-11  Mg     2.2     10-11        bisacodyl Suppository 10 milliGRAM(s) Rectal daily PRN  carbidopa/levodopa  25/100 1 Tablet(s) Oral <User Schedule>  carbidopa/levodopa CR 50/200 1 Tablet(s) Oral <User Schedule>  docusate sodium 100 milliGRAM(s) Oral daily  donepezil 10 milliGRAM(s) Oral at bedtime  escitalopram 20 milliGRAM(s) Oral daily  heparin  Injectable 5000 Unit(s) SubCutaneous every 8 hours  levothyroxine 88 MICROGram(s) Oral daily  oxyCODONE    IR 5 milliGRAM(s) Oral every 6 hours PRN  pantoprazole    Tablet 40 milliGRAM(s) Oral before breakfast  senna 2 Tablet(s) Oral at bedtime  simvastatin 20 milliGRAM(s) Oral at bedtime      HEALTH ISSUES - PROBLEM Dx:          Case Discussed with House Staff   45 minutes spent on total encounter; more than 50% of the visit was spent counseling and/or coordinating care by the attending physician.   Spectra [phone]

## 2019-10-12 NOTE — CONSULT NOTE ADULT - SUBJECTIVE AND OBJECTIVE BOX
HPI:  79F, PMHx Parkinsons, Hypothyroid, presents to ED s/p fall. As per family, they heard the pt fall around 1130pm, pt was found down lying onto her left side, +HT, ?LOC, -AC. Pt does not recall how she fell. Pt presents w/ Left shoulder pain. Otherwise no HA, no CP, no SOB, no abdominal pain, no dysuria. (07 Oct 2019 04:27)    Pt seen and examined at bedside with daughter present.  PT c/o chronic neck pain.  Denies radicular symptoms,  paresthesias, tingling or numbness.  c/o pain to left clavicle 2/2 acute fracture.  Pt in arm sling. Follows commands.      < from: MR Cervical Spine No Cont (10.10.19 @ 16:17) >  Impression:    Severe spinal canal stenosis without cord compression at C6-C7.   Additional moderate degenerative changes as above. No cord signal   abnormality.    < end of copied text >  < from: CT Cervical Spine No Cont (10.07.19 @ 02:06) >  IMPRESSION:    No acute cervical spine fracture or subluxation.    Acute left clavicular fracture.See CT chest, abdomen and pelvis report   from the same day.    < end of copied text >          PAST MEDICAL & SURGICAL HISTORY:  Hypothyroid  Parkinsonian syndrome      Home Medications:  CARB/LEVO    TAB 25-100MG:  (07 Oct 2019 04:45)  CARB/LEVO ER TAB 50-200MG:  (07 Oct 2019 04:45)  DONEPEZIL    TAB 10MG:  (07 Oct 2019 04:45)  ESCITALOPRAM TAB 20MG:  (07 Oct 2019 04:45)  LEVOTHYROXIN TAB 88MCG:  (07 Oct 2019 04:45)  SIMVASTATIN  TAB 20MG:  (07 Oct 2019 04:45)      Allergies    No Known Allergies    Intolerances        MEDICATIONS  (STANDING):  carbidopa/levodopa  25/100 1 Tablet(s) Oral <User Schedule>  carbidopa/levodopa CR 50/200 1 Tablet(s) Oral <User Schedule>  docusate sodium 100 milliGRAM(s) Oral daily  donepezil 10 milliGRAM(s) Oral at bedtime  escitalopram 20 milliGRAM(s) Oral daily  heparin  Injectable 5000 Unit(s) SubCutaneous every 8 hours  levothyroxine 88 MICROGram(s) Oral daily  pantoprazole    Tablet 40 milliGRAM(s) Oral before breakfast  senna 2 Tablet(s) Oral at bedtime  simvastatin 20 milliGRAM(s) Oral at bedtime    MEDICATIONS  (PRN):  bisacodyl Suppository 10 milliGRAM(s) Rectal daily PRN Constipation  oxyCODONE    IR 5 milliGRAM(s) Oral every 6 hours PRN Moderate Pain (4 - 6)      ICU Vital Signs Last 24 Hrs  T(C): 35.8 (12 Oct 2019 16:11), Max: 36.9 (12 Oct 2019 00:06)  T(F): 96.4 (12 Oct 2019 16:11), Max: 98.5 (12 Oct 2019 00:06)  HR: 83 (12 Oct 2019 16:11) (73 - 83)  BP: 154/74 (12 Oct 2019 16:11) (153/65 - 177/79)  BP(mean): --  ABP: --  ABP(mean): --  RR: 18 (12 Oct 2019 16:11) (18 - 18)  SpO2: --      I&O's Detail    11 Oct 2019 07:01  -  12 Oct 2019 07:00  --------------------------------------------------------  IN:    Oral Fluid: 680 mL  Total IN: 680 mL    OUT:    Incontinent per Collection Ba mL  Total OUT: 200 mL    Total NET: 480 mL      12 Oct 2019 07:01  -  12 Oct 2019 18:25  --------------------------------------------------------  IN:    Oral Fluid: 360 mL  Total IN: 360 mL    OUT:  Total OUT: 0 mL    Total NET: 360 mL          CBC Full  -  ( 11 Oct 2019 06:28 )  WBC Count : 8.41 K/uL  RBC Count : 3.77 M/uL  Hemoglobin : 11.5 g/dL  Hematocrit : 35.9 %  Platelet Count - Automated : 173 K/uL  Mean Cell Volume : 95.2 fL  Mean Cell Hemoglobin : 30.5 pg  Mean Cell Hemoglobin Concentration : 32.0 g/dL  Auto Neutrophil # : 5.55 K/uL  Auto Lymphocyte # : 1.65 K/uL  Auto Monocyte # : 0.88 K/uL  Auto Eosinophil # : 0.24 K/uL  Auto Basophil # : 0.06 K/uL  Auto Neutrophil % : 65.9 %  Auto Lymphocyte % : 19.6 %  Auto Monocyte % : 10.5 %  Auto Eosinophil % : 2.9 %  Auto Basophil % : 0.7 %    10-11    141  |  103  |  11  ----------------------------<  96  3.9   |  30  |  0.7    Ca    8.5      11 Oct 2019 06:28  Phos  2.6     10-11  Mg     2.2     10-11              AAOX3. Verbal function intact  follows commands  PERRL  tracking  Motor: MAEx3  LUE in arm sling, moves fingers  5-/5 power in RUE  LUE power limited 2/2 pain from clavicle fracture  b/l LE's gross motor strength intact  Sensation: intact to touch in all extremities      Assessment/Plan:  No neurosurgical intervention  follow up as outpatient if needed  d/w Dr Pablo

## 2019-10-12 NOTE — CONSULT NOTE ADULT - CONSULT REASON
unwitnessed mechanical fall L sided 3-7 rib fx   respiratory monitoring and pain control
Abnormal EEG
cervical spine stenosis
clavicle fx
fall/multitrauma

## 2019-10-13 PROCEDURE — 99232 SBSQ HOSP IP/OBS MODERATE 35: CPT

## 2019-10-13 RX ORDER — SODIUM CHLORIDE 9 MG/ML
1000 INJECTION INTRAMUSCULAR; INTRAVENOUS; SUBCUTANEOUS ONCE
Refills: 0 | Status: COMPLETED | OUTPATIENT
Start: 2019-10-13 | End: 2019-10-13

## 2019-10-13 RX ADMIN — SENNA PLUS 2 TABLET(S): 8.6 TABLET ORAL at 21:23

## 2019-10-13 RX ADMIN — CARBIDOPA AND LEVODOPA 1 TABLET(S): 25; 100 TABLET ORAL at 05:17

## 2019-10-13 RX ADMIN — CARBIDOPA AND LEVODOPA 1 TABLET(S): 25; 100 TABLET ORAL at 11:09

## 2019-10-13 RX ADMIN — HEPARIN SODIUM 5000 UNIT(S): 5000 INJECTION INTRAVENOUS; SUBCUTANEOUS at 05:17

## 2019-10-13 RX ADMIN — DONEPEZIL HYDROCHLORIDE 10 MILLIGRAM(S): 10 TABLET, FILM COATED ORAL at 21:22

## 2019-10-13 RX ADMIN — HEPARIN SODIUM 5000 UNIT(S): 5000 INJECTION INTRAVENOUS; SUBCUTANEOUS at 13:25

## 2019-10-13 RX ADMIN — OXYCODONE HYDROCHLORIDE 5 MILLIGRAM(S): 5 TABLET ORAL at 15:15

## 2019-10-13 RX ADMIN — OXYCODONE HYDROCHLORIDE 5 MILLIGRAM(S): 5 TABLET ORAL at 21:27

## 2019-10-13 RX ADMIN — PANTOPRAZOLE SODIUM 40 MILLIGRAM(S): 20 TABLET, DELAYED RELEASE ORAL at 05:17

## 2019-10-13 RX ADMIN — CARBIDOPA AND LEVODOPA 1 TABLET(S): 25; 100 TABLET ORAL at 21:22

## 2019-10-13 RX ADMIN — SODIUM CHLORIDE 1000 MILLILITER(S): 9 INJECTION INTRAMUSCULAR; INTRAVENOUS; SUBCUTANEOUS at 15:56

## 2019-10-13 RX ADMIN — HEPARIN SODIUM 5000 UNIT(S): 5000 INJECTION INTRAVENOUS; SUBCUTANEOUS at 21:24

## 2019-10-13 RX ADMIN — CARBIDOPA AND LEVODOPA 1 TABLET(S): 25; 100 TABLET ORAL at 17:34

## 2019-10-13 RX ADMIN — OXYCODONE HYDROCHLORIDE 5 MILLIGRAM(S): 5 TABLET ORAL at 14:48

## 2019-10-13 RX ADMIN — SIMVASTATIN 20 MILLIGRAM(S): 20 TABLET, FILM COATED ORAL at 21:23

## 2019-10-13 RX ADMIN — Medication 88 MICROGRAM(S): at 05:17

## 2019-10-13 RX ADMIN — Medication 100 MILLIGRAM(S): at 11:09

## 2019-10-13 RX ADMIN — OXYCODONE HYDROCHLORIDE 5 MILLIGRAM(S): 5 TABLET ORAL at 22:08

## 2019-10-13 RX ADMIN — ESCITALOPRAM OXALATE 20 MILLIGRAM(S): 10 TABLET, FILM COATED ORAL at 11:09

## 2019-10-13 NOTE — DIETITIAN INITIAL EVALUATION ADULT. - OTHER INFO
80y/o female with pmhx noted above present s/p fall secondary to suspicion for syncope. An MRI of the cervical spine revealed severe spinal canal stenosis without cord compression at C6-C7 and osteoarthritic changes. MRI Brain revealed no evidence of acute intracranial pathology but does show mild chronic microvascular ischemic changes. Skin is intact (Feliz Score-20).      Subjective Data: I contacted the patient's daughter name Maritza [phone]. Per daughter, the patient followed a high fiber diet at home secondary to issues with constipation; consumed two meals at 75%; received chocolate or strawberry ensure one to two times daily, centrum silver, Vitamin D3, Vitamin E and CoQ10 supplements daily.

## 2019-10-13 NOTE — DIETITIAN INITIAL EVALUATION ADULT. - ADD RECOMMEND
1.Provide Chocolate Ensure Enlive Q8hrs 2.If poor appetite persist, recommend provide an appetite stimulant

## 2019-10-13 NOTE — DIETITIAN INITIAL EVALUATION ADULT. - FACTORS AFF FOOD INTAKE
An interview with the patient could not be completed at this time since the patient is asleep. Per observation, the patient's lunch tray is untouched. Per EMR, the patient consumes 40% of meals.

## 2019-10-13 NOTE — DIETITIAN INITIAL EVALUATION ADULT. - ENERGY NEEDS
Estimated Calorie Needs: [id number] x AF 1.2-1.3=1182-1281kcal/day   Estimated Protein Needs: 54-65grams/day (1-1.2grams/kg of admit weight)  Estimated Fluids Needs: 1182-1281mL/day (1mL/kcal)

## 2019-10-13 NOTE — PROGRESS NOTE ADULT - SUBJECTIVE AND OBJECTIVE BOX
Hospital Day:  6d    Subjective:    Patient is a 79y old Female who presents with a chief complaint of Fall (11 Oct 2019 11:33)  Patient reports no new complaints and asks to go home.    Past Medical Hx:   Hypothyroid  Parkinsonian syndrome    Past Sx:    Allergies:  No Known Allergies    Current Meds:   Standng Meds:  carbidopa/levodopa  25/100 1 Tablet(s) Oral <User Schedule>  carbidopa/levodopa CR 50/200 1 Tablet(s) Oral <User Schedule>  docusate sodium 100 milliGRAM(s) Oral daily  donepezil 10 milliGRAM(s) Oral at bedtime  escitalopram 20 milliGRAM(s) Oral daily  heparin  Injectable 5000 Unit(s) SubCutaneous every 8 hours  levothyroxine 88 MICROGram(s) Oral daily  pantoprazole    Tablet 40 milliGRAM(s) Oral before breakfast  senna 2 Tablet(s) Oral at bedtime  simvastatin 20 milliGRAM(s) Oral at bedtime    PRN Meds:  bisacodyl Suppository 10 milliGRAM(s) Rectal daily PRN Constipation  oxyCODONE    IR 5 milliGRAM(s) Oral every 6 hours PRN Moderate Pain (4 - 6)    HOME MEDICATIONS:  CARB/LEVO    TAB 25-100MG:   CARB/LEVO ER TAB 50-200MG:   DONEPEZIL    TAB 10MG:   ESCITALOPRAM TAB 20MG:   LEVOTHYROXIN TAB 88MCG:   SIMVASTATIN  TAB 20MG:       Vital Signs:   T(F): 98 (10-13-19 @ 07:10), Max: 99.3 (10-12-19 @ 23:53)  HR: 67 (10-13-19 @ 07:10) (67 - 83)  BP: 147/67 (10-13-19 @ 07:10) (147/67 - 170/71)  RR: 18 (10-13-19 @ 07:10) (18 - 18)  SpO2: --      10-12-19 @ 07:01  -  10-13-19 @ 07:00  --------------------------------------------------------  IN: 360 mL / OUT: 0 mL / NET: 360 mL        Physical Exam:   GENERAL: NAD  HEENT: NCAT  CHEST/LUNG: CTAB  HEART: Regular rate and rhythm; s1 s2 appreciated, No murmurs, rubs, or gallops  ABDOMEN: Soft, Nontender, Nondistended; Bowel sounds present  EXTREMITIES: No LE edema b/l  NERVOUS SYSTEM:  Alert & Oriented X3      Assessment and Plan:     Patient is a 79y old  Female who presents with a chief complaint of Fall (11 Oct 2019 11:33)    #Fall secondary to suspicion for syncope (unable to recall event and found on floor)   - neurology following; recommends no further intervention    #Spinal stenosis   - neurosurgery recommends no acute intervention and f/u outpatient    #CKD 2   - stable    #Parkinson's disease   - sinemet    #Hypothyroid   - synthroid    #Dyslipidemia   - zocor      #Acute displaced midshaft fracture of the left clavicle  - Splinted  - No further intervention per trauma    #Left sided rib fracture no acute intervention  - No further intervention per trauma    DVT PPX: heparin subq  GI PPX: protonix  Disposition: 4a

## 2019-10-13 NOTE — DIETITIAN INITIAL EVALUATION ADULT. - RD TO REMAIN AVAILABLE
yes/Intervention: 1.Meals and Snacks 2.Medical Food Supplement 3.Nutrition Related Medication   Monitor/Evaluate: Monitor diet order, energy intake, nutrition focused physical findings, anemia profile

## 2019-10-13 NOTE — DIETITIAN INITIAL EVALUATION ADULT. - PHYSICAL APPEARANCE
BMI-20; A nutrition focused physical examination could not be completed at this time since the patient is asleep. No bowel movement is noted; currently on a bowel regimen.

## 2019-10-14 ENCOUNTER — TRANSCRIPTION ENCOUNTER (OUTPATIENT)
Age: 79
End: 2019-10-14

## 2019-10-14 ENCOUNTER — INPATIENT (INPATIENT)
Facility: HOSPITAL | Age: 79
LOS: 14 days | Discharge: NURSING, REHAB OR LONG-TERM CARE | End: 2019-10-29
Attending: PHYSICAL MEDICINE & REHABILITATION | Admitting: PHYSICAL MEDICINE & REHABILITATION
Payer: MEDICARE

## 2019-10-14 VITALS
HEART RATE: 66 BPM | SYSTOLIC BLOOD PRESSURE: 166 MMHG | DIASTOLIC BLOOD PRESSURE: 72 MMHG | RESPIRATION RATE: 17 BRPM | TEMPERATURE: 97 F

## 2019-10-14 PROBLEM — E03.9 HYPOTHYROIDISM, UNSPECIFIED: Chronic | Status: ACTIVE | Noted: 2019-10-07

## 2019-10-14 LAB
APPEARANCE UR: ABNORMAL
BACTERIA # UR AUTO: NEGATIVE — SIGNIFICANT CHANGE UP
BILIRUB UR-MCNC: NEGATIVE — SIGNIFICANT CHANGE UP
COLOR SPEC: YELLOW — SIGNIFICANT CHANGE UP
DIFF PNL FLD: SIGNIFICANT CHANGE UP
EPI CELLS # UR: 1 /HPF — SIGNIFICANT CHANGE UP (ref 0–5)
GLUCOSE UR QL: NEGATIVE — SIGNIFICANT CHANGE UP
HYALINE CASTS # UR AUTO: 0 /LPF — SIGNIFICANT CHANGE UP (ref 0–7)
KETONES UR-MCNC: NEGATIVE — SIGNIFICANT CHANGE UP
LEUKOCYTE ESTERASE UR-ACNC: NEGATIVE — SIGNIFICANT CHANGE UP
NITRITE UR-MCNC: NEGATIVE — SIGNIFICANT CHANGE UP
PH UR: 7.5 — SIGNIFICANT CHANGE UP (ref 5–8)
PROT UR-MCNC: NEGATIVE — SIGNIFICANT CHANGE UP
RBC CASTS # UR COMP ASSIST: 9 /HPF — HIGH (ref 0–4)
SP GR SPEC: 1.02 — SIGNIFICANT CHANGE UP (ref 1.01–1.02)
UROBILINOGEN FLD QL: ABNORMAL
WBC UR QL: 2 /HPF — SIGNIFICANT CHANGE UP (ref 0–5)

## 2019-10-14 PROCEDURE — 93010 ELECTROCARDIOGRAM REPORT: CPT

## 2019-10-14 PROCEDURE — 99239 HOSP IP/OBS DSCHRG MGMT >30: CPT

## 2019-10-14 RX ORDER — DOCUSATE SODIUM 100 MG
100 CAPSULE ORAL
Refills: 0 | Status: DISCONTINUED | OUTPATIENT
Start: 2019-10-14 | End: 2019-10-29

## 2019-10-14 RX ORDER — OXYCODONE HYDROCHLORIDE 5 MG/1
5 TABLET ORAL EVERY 4 HOURS
Refills: 0 | Status: DISCONTINUED | OUTPATIENT
Start: 2019-10-14 | End: 2019-10-16

## 2019-10-14 RX ORDER — ENOXAPARIN SODIUM 100 MG/ML
40 INJECTION SUBCUTANEOUS DAILY
Refills: 0 | Status: DISCONTINUED | OUTPATIENT
Start: 2019-10-14 | End: 2019-10-29

## 2019-10-14 RX ORDER — CARBIDOPA AND LEVODOPA 25; 100 MG/1; MG/1
1 TABLET ORAL
Qty: 0 | Refills: 0 | DISCHARGE
Start: 2019-10-14

## 2019-10-14 RX ORDER — CARBIDOPA AND LEVODOPA 25; 100 MG/1; MG/1
0 TABLET ORAL
Qty: 90 | Refills: 0 | DISCHARGE

## 2019-10-14 RX ORDER — ACETAMINOPHEN 500 MG
2 TABLET ORAL
Qty: 240 | Refills: 0
Start: 2019-10-14 | End: 2019-11-12

## 2019-10-14 RX ORDER — OXYCODONE HYDROCHLORIDE 5 MG/1
10 TABLET ORAL EVERY 4 HOURS
Refills: 0 | Status: DISCONTINUED | OUTPATIENT
Start: 2019-10-14 | End: 2019-10-16

## 2019-10-14 RX ORDER — CARBIDOPA AND LEVODOPA 25; 100 MG/1; MG/1
0 TABLET ORAL
Qty: 270 | Refills: 0 | DISCHARGE

## 2019-10-14 RX ORDER — LEVOTHYROXINE SODIUM 125 MCG
88 TABLET ORAL DAILY
Refills: 0 | Status: DISCONTINUED | OUTPATIENT
Start: 2019-10-14 | End: 2019-10-29

## 2019-10-14 RX ORDER — DONEPEZIL HYDROCHLORIDE 10 MG/1
10 TABLET, FILM COATED ORAL AT BEDTIME
Refills: 0 | Status: DISCONTINUED | OUTPATIENT
Start: 2019-10-14 | End: 2019-10-29

## 2019-10-14 RX ORDER — SENNA PLUS 8.6 MG/1
2 TABLET ORAL AT BEDTIME
Refills: 0 | Status: DISCONTINUED | OUTPATIENT
Start: 2019-10-14 | End: 2019-10-29

## 2019-10-14 RX ORDER — ESCITALOPRAM OXALATE 10 MG/1
20 TABLET, FILM COATED ORAL DAILY
Refills: 0 | Status: DISCONTINUED | OUTPATIENT
Start: 2019-10-14 | End: 2019-10-29

## 2019-10-14 RX ORDER — MAGNESIUM HYDROXIDE 400 MG/1
30 TABLET, CHEWABLE ORAL DAILY
Refills: 0 | Status: DISCONTINUED | OUTPATIENT
Start: 2019-10-14 | End: 2019-10-28

## 2019-10-14 RX ORDER — ACETAMINOPHEN 500 MG
650 TABLET ORAL EVERY 6 HOURS
Refills: 0 | Status: DISCONTINUED | OUTPATIENT
Start: 2019-10-14 | End: 2019-10-14

## 2019-10-14 RX ORDER — CARBIDOPA AND LEVODOPA 25; 100 MG/1; MG/1
1 TABLET ORAL
Refills: 0 | Status: DISCONTINUED | OUTPATIENT
Start: 2019-10-14 | End: 2019-10-15

## 2019-10-14 RX ORDER — SIMVASTATIN 20 MG/1
20 TABLET, FILM COATED ORAL AT BEDTIME
Refills: 0 | Status: DISCONTINUED | OUTPATIENT
Start: 2019-10-14 | End: 2019-10-29

## 2019-10-14 RX ORDER — ACETAMINOPHEN 500 MG
650 TABLET ORAL EVERY 6 HOURS
Refills: 0 | Status: DISCONTINUED | OUTPATIENT
Start: 2019-10-14 | End: 2019-10-15

## 2019-10-14 RX ORDER — PANTOPRAZOLE SODIUM 20 MG/1
40 TABLET, DELAYED RELEASE ORAL
Refills: 0 | Status: DISCONTINUED | OUTPATIENT
Start: 2019-10-14 | End: 2019-10-29

## 2019-10-14 RX ORDER — LANOLIN ALCOHOL/MO/W.PET/CERES
5 CREAM (GRAM) TOPICAL AT BEDTIME
Refills: 0 | Status: DISCONTINUED | OUTPATIENT
Start: 2019-10-14 | End: 2019-10-29

## 2019-10-14 RX ADMIN — Medication 650 MILLIGRAM(S): at 18:55

## 2019-10-14 RX ADMIN — DONEPEZIL HYDROCHLORIDE 10 MILLIGRAM(S): 10 TABLET, FILM COATED ORAL at 21:20

## 2019-10-14 RX ADMIN — CARBIDOPA AND LEVODOPA 1 TABLET(S): 25; 100 TABLET ORAL at 05:39

## 2019-10-14 RX ADMIN — CARBIDOPA AND LEVODOPA 1 TABLET(S): 25; 100 TABLET ORAL at 18:09

## 2019-10-14 RX ADMIN — HEPARIN SODIUM 5000 UNIT(S): 5000 INJECTION INTRAVENOUS; SUBCUTANEOUS at 11:39

## 2019-10-14 RX ADMIN — ESCITALOPRAM OXALATE 20 MILLIGRAM(S): 10 TABLET, FILM COATED ORAL at 11:39

## 2019-10-14 RX ADMIN — Medication 5 MILLIGRAM(S): at 21:20

## 2019-10-14 RX ADMIN — SIMVASTATIN 20 MILLIGRAM(S): 20 TABLET, FILM COATED ORAL at 21:20

## 2019-10-14 RX ADMIN — Medication 88 MICROGRAM(S): at 05:38

## 2019-10-14 RX ADMIN — SENNA PLUS 2 TABLET(S): 8.6 TABLET ORAL at 21:20

## 2019-10-14 RX ADMIN — PANTOPRAZOLE SODIUM 40 MILLIGRAM(S): 20 TABLET, DELAYED RELEASE ORAL at 05:38

## 2019-10-14 RX ADMIN — Medication 100 MILLIGRAM(S): at 11:39

## 2019-10-14 RX ADMIN — CARBIDOPA AND LEVODOPA 1 TABLET(S): 25; 100 TABLET ORAL at 11:39

## 2019-10-14 RX ADMIN — HEPARIN SODIUM 5000 UNIT(S): 5000 INJECTION INTRAVENOUS; SUBCUTANEOUS at 05:39

## 2019-10-14 RX ADMIN — CARBIDOPA AND LEVODOPA 1 TABLET(S): 25; 100 TABLET ORAL at 21:20

## 2019-10-14 RX ADMIN — Medication 650 MILLIGRAM(S): at 08:32

## 2019-10-14 RX ADMIN — Medication 100 MILLIGRAM(S): at 18:09

## 2019-10-14 NOTE — DISCHARGE NOTE PROVIDER - NSDCFUSCHEDAPPT_GEN_ALL_CORE_FT
RODGER REYNA ; 12/12/2019 ; [facility] [address] RODGER REYNA ; 12/12/2019 ; [facility] [address]

## 2019-10-14 NOTE — DISCHARGE NOTE PROVIDER - HOSPITAL COURSE
79 year old female presenting for a fall possibly due to syncope.    She was seen by the neurology team and EEG and MRI showed no obvious cause of syncope. Neuro team recommend no further intervention.         An MRI cervical spine showed c6-c7 spinal stenosis. She was seen neurosurgery  who recommends no acute intervention and f/u outpatient        As a result of the fall she had an acute displaced  midshaft fracture of the left clavicle which was splinted with No further intervention per trauma. She also had Left sided rib fracture no acute intervention

## 2019-10-14 NOTE — DISCHARGE NOTE PROVIDER - CARE PROVIDER_API CALL
Chintan Pablo)  Neurological Surgery  [address] [place] [address]  Phone: [phone]  Fax: [phone]  Follow Up Time:

## 2019-10-14 NOTE — PROGRESS NOTE ADULT - SUBJECTIVE AND OBJECTIVE BOX
RODGER REYNA 79y Female  MRN#: [id number]       SUBJECTIVE  Patient is a 79y old Female who presents with a chief complaint of Fall (11 Oct 2019 11:33)  Currently admitted to medicine with the primary diagnosis of Fall  .   Today is hospital day 7d, and this morning she is _________ and reports ________ overnight events.         OBJECTIVE  PAST MEDICAL & SURGICAL HISTORY  Hypothyroid  Parkinsonian syndrome    ALLERGIES:  No Known Allergies    MEDICATIONS:  STANDING MEDICATIONS  carbidopa/levodopa  25/100 1 Tablet(s) Oral <User Schedule>  carbidopa/levodopa CR 50/200 1 Tablet(s) Oral <User Schedule>  docusate sodium 100 milliGRAM(s) Oral daily  donepezil 10 milliGRAM(s) Oral at bedtime  escitalopram 20 milliGRAM(s) Oral daily  heparin  Injectable 5000 Unit(s) SubCutaneous every 8 hours  levothyroxine 88 MICROGram(s) Oral daily  pantoprazole    Tablet 40 milliGRAM(s) Oral before breakfast  senna 2 Tablet(s) Oral at bedtime  simvastatin 20 milliGRAM(s) Oral at bedtime    PRN MEDICATIONS  bisacodyl Suppository 10 milliGRAM(s) Rectal daily PRN  oxyCODONE    IR 5 milliGRAM(s) Oral every 6 hours PRN      VITAL SIGNS: Last 24 Hours  T(C): 37.2 (13 Oct 2019 23:59), Max: 37.2 (13 Oct 2019 23:59)  T(F): 98.9 (13 Oct 2019 23:59), Max: 98.9 (13 Oct 2019 23:59)  HR: 72 (13 Oct 2019 23:59) (67 - 74)  BP: 159/73 (13 Oct 2019 23:59) (147/67 - 162/73)  BP(mean): --  RR: 18 (13 Oct 2019 23:59) (18 - 18)  SpO2: --    LABS:                        RADIOLOGY:      PHYSICAL EXAM:    GENERAL: NAD, well-developed, AAOx3  HEENT:  Atraumatic, Normocephalic. EOMI, PERRLA, conjunctiva and sclera clear, No JVD  PULMONARY: Clear to auscultation bilaterally; No wheeze  CARDIOVASCULAR: Regular rate and rhythm; No murmurs, rubs, or gallops  GASTROINTESTINAL: Soft, Nontender, Nondistended; Bowel sounds present  MUSCULOSKELETAL:  2+ Peripheral Pulses, No clubbing, cyanosis, or edema  NEUROLOGY: non-focal  SKIN: No rashes or lesions      ADMISSION SUMMARY  Patient is a 79y old Female who presents with a chief complaint of Fall (11 Oct 2019 11:33)  Currently admitted to medicine with the primary diagnosis of Fall  Hospital course has been as follows:      ASSESSMENT & PLAN    1. FALL;RIB FRACTURES;CLAVICULAR FRACTURE      2.    3. Hypothyroid  Parkinsonian syndrome RODGER REYNA 79y Female  MRN#: [id number]       SUBJECTIVE  Patient is a 79y old Female who presents with a chief complaint of Fall (11 Oct 2019 11:33)  Currently admitted to medicine with the primary diagnosis of Fall  .   Today is hospital day 7d, and this morning she is well. She has no complaints other than being annoyed that people are disrupting her sleep. She also has left shoulder pain    OBJECTIVE  PAST MEDICAL & SURGICAL HISTORY  Hypothyroid  Parkinsonian syndrome    ALLERGIES:  No Known Allergies    MEDICATIONS:  STANDING MEDICATIONS  carbidopa/levodopa  25/100 1 Tablet(s) Oral <User Schedule>  carbidopa/levodopa CR 50/200 1 Tablet(s) Oral <User Schedule>  docusate sodium 100 milliGRAM(s) Oral daily  donepezil 10 milliGRAM(s) Oral at bedtime  escitalopram 20 milliGRAM(s) Oral daily  heparin  Injectable 5000 Unit(s) SubCutaneous every 8 hours  levothyroxine 88 MICROGram(s) Oral daily  pantoprazole    Tablet 40 milliGRAM(s) Oral before breakfast  senna 2 Tablet(s) Oral at bedtime  simvastatin 20 milliGRAM(s) Oral at bedtime    PRN MEDICATIONS  bisacodyl Suppository 10 milliGRAM(s) Rectal daily PRN  oxyCODONE    IR 5 milliGRAM(s) Oral every 6 hours PRN      VITAL SIGNS: Last 24 Hours  T(C): 37.2 (13 Oct 2019 23:59), Max: 37.2 (13 Oct 2019 23:59)  T(F): 98.9 (13 Oct 2019 23:59), Max: 98.9 (13 Oct 2019 23:59)  HR: 72 (13 Oct 2019 23:59) (67 - 74)  BP: 159/73 (13 Oct 2019 23:59) (147/67 - 162/73)  BP(mean): --  RR: 18 (13 Oct 2019 23:59) (18 - 18)  SpO2: --    LABS: NO LABS  - no labs needed    PHYSICAL EXAM:    GENERAL: NAD, well-developed, AAOx3  HEENT:  Atraumatic, Normocephalic. EOMI, PERRLA, conjunctiva and sclera clear, No JVD  PULMONARY: Clear to auscultation bilaterally; No wheeze  CARDIOVASCULAR: Regular rate and rhythm; No murmurs, rubs, or gallops  GASTROINTESTINAL: Soft, Nontender, Nondistended; Bowel sounds present  MUSCULOSKELETAL: left arm in sling. tenderness to palpation of collar.     ADMISSION SUMMARY  Patient is a 79y old Female who presents with a chief complaint of Fall (11 Oct 2019 11:33)  Currently admitted to medicine with the primary diagnosis of Fall    ASSESSMENT & PLAN  79 year old female presenting for a fall possibly due to syncope. Syncope work up was negative. Patient has a left 3rd rib fx and midshaft clavicle fracture.     Neuro:(1) Fall suspicion for syncope (unable to recall event and found on floor)   - EEG showed generalizes slowing  - MRI showed no acute changes  - Neuro recommend no further intervention   (2) Parkinson's disease   - sinemet  -stable    Neurosug: MRI cervical spine showed c6-c7 spinal stenosis  - neurosurgery recommends no acute intervention and f/u outpatient    Nephro: CKD stage 2  - stable    Endo: (1) hypothyroidism   - synthroid, stable   (2) DLD   - zocor, keep same     Ortho: (1) acute displaced  midshaft fracture of the left clavicle  - splinted  - No further intervention per trauma  (2) Left sided rib fracture no acute intervention  - No further intervention per trauma    DVT PPX: heparin subq  GI PPX: protonix  Disposition: 4a

## 2019-10-14 NOTE — PROGRESS NOTE ADULT - ATTENDING COMMENTS
patient seen and examined , agree with pgy 1 assesment and plan except as indicated above,   GEN Lying in no acute distress  HEENT Pupils equal and reactive to light and accommodationSupple Neck  PULM Clear to auscultation bilaterally  CV s1s2 regular rate and rhythm  GI + bowel sounds nontnender  EXT no cyanosis or edema , left arm in sling  PSYCH awake alert and oriented x 3  INTEG No Lesions  NEURO WHITAKER  Progress Note Handoff    Pending: DC TO 4a , time spent reviewing med rec 41 min  Family discussion: patient is of sound mind and agrees to plan of care    Disposition: 4a
patient seen and examined , agree with pgy 1 assesment and plan except as indicated above,   patient is cleared for dc to 4a , discussed with famiyl who are in agreement, physiatrist   time spent coordinating care 38 min
79y Female presenting as a Trauma Consult , PMHx Parkinsons, Hypothyroid, presents to ED s/p unwitnessed fall at 1130pm, found down by family lying onto her Left side, +HT, ?LOC, -AC, unsure how she fell. Presents w/ L shoulder pain. Pulling 1000 on IS. Downgraded. Pain controlled. Carotid duplex negative.  - Acute displaced midshaft L clavicle fx   - Acute L 3,4,5,6,7 rib fx  - Ascending thoracic aorta aneurysm 4.1cm      PLAN:  -Pain management  -Encourage IS - inspiring 1000 mL  -PT: rehab facility  -F/U OT  -F/U Echo  -F/U EEG  -Ortho: out patient follow up with Dr. Pranay Rodriguez  -hypophosphatemia: repleted
I examined the patient with the PA and resident and discussed my plan with them    this patient reports pain associated with the clavicle fx that is expected, but it is manageable with the po regimen she is on.  She is pulling 750 on is and is limited by effort and not pain.  She worked with pt and is a 4a candidate for acute rehab pending auth
multiple ribs fracture   clavicular fracture   admit to SICU , ortho f/u   Pain control   IS   transfer to floor.
I have personally seen and examined this patient.  I have fully participated in the care of this patient.  I have reviewed all pertinent clinical information, influding history, physical exam, plan and note.   I have reviewed all pertinent clinical information and reviewed all relevant imaging and diagnostic studies personally.  80 yo w/ h/o IPD currently admitted for mechanical fall with increase ataxia with no acute changes on workup.  suspect worsening parkinsonism.  Recommendations as above.

## 2019-10-14 NOTE — DISCHARGE NOTE NURSING/CASE MANAGEMENT/SOCIAL WORK - NSDCPEPTSTRK_GEN_ALL_CORE
Stroke support groups for patients, families, and friends/Call 911 for stroke/Prescribed medications/Stroke education booklet/Stroke warning signs and symptoms/Signs and symptoms of stroke/Need for follow up after discharge/Risk factors for stroke

## 2019-10-14 NOTE — DISCHARGE NOTE PROVIDER - NSDCCPCAREPLAN_GEN_ALL_CORE_FT
PRINCIPAL DISCHARGE DIAGNOSIS  Diagnosis: Fall  Assessment and Plan of Treatment: You presented  for a fall possibly due to syncope.  You were seen by the neurology team and EEG and MRI showed no obvious cause of syncope. Neuro team recommend no further intervention.         SECONDARY DISCHARGE DIAGNOSES  Diagnosis: Spinal stenosis  Assessment and Plan of Treatment: An MRI cervical spine showed c6-c7 spinal stenosis. You were seen byneurosurgery  who recommends no acute intervention and f/u outpatient    Diagnosis: Clavicular fracture  Assessment and Plan of Treatment: As a result of the fall you had an acute displaced  midshaft fracture of the left clavicle which was splinted with no further intervention per trauma.       Diagnosis: Rib fractures  Assessment and Plan of Treatment: You also had Left sided rib fracture with recommendation for pain control

## 2019-10-14 NOTE — DISCHARGE NOTE NURSING/CASE MANAGEMENT/SOCIAL WORK - PATIENT PORTAL LINK FT
You can access the FollowMyHealth Patient Portal offered by [facility] by registering at the following website: [email or web address]. By joining [employer]’s FollowMyHealth portal, you will also be able to view your health information using other applications (apps) compatible with our system.

## 2019-10-15 LAB
24R-OH-CALCIDIOL SERPL-MCNC: 43 NG/ML — SIGNIFICANT CHANGE UP (ref 30–80)
ALBUMIN SERPL ELPH-MCNC: 3.8 G/DL — SIGNIFICANT CHANGE UP (ref 3.5–5.2)
ALP SERPL-CCNC: 62 U/L — SIGNIFICANT CHANGE UP (ref 30–115)
ALT FLD-CCNC: <5 U/L — SIGNIFICANT CHANGE UP (ref 0–41)
ANION GAP SERPL CALC-SCNC: 12 MMOL/L — SIGNIFICANT CHANGE UP (ref 7–14)
AST SERPL-CCNC: 17 U/L — SIGNIFICANT CHANGE UP (ref 0–41)
BILIRUB SERPL-MCNC: 0.5 MG/DL — SIGNIFICANT CHANGE UP (ref 0.2–1.2)
BUN SERPL-MCNC: 13 MG/DL — SIGNIFICANT CHANGE UP (ref 10–20)
CALCIUM SERPL-MCNC: 9.1 MG/DL — SIGNIFICANT CHANGE UP (ref 8.5–10.1)
CHLORIDE SERPL-SCNC: 102 MMOL/L — SIGNIFICANT CHANGE UP (ref 98–110)
CO2 SERPL-SCNC: 29 MMOL/L — SIGNIFICANT CHANGE UP (ref 17–32)
CREAT SERPL-MCNC: 0.7 MG/DL — SIGNIFICANT CHANGE UP (ref 0.7–1.5)
GLUCOSE SERPL-MCNC: 86 MG/DL — SIGNIFICANT CHANGE UP (ref 70–99)
HCT VFR BLD CALC: 40.5 % — SIGNIFICANT CHANGE UP (ref 37–47)
HGB BLD-MCNC: 12.8 G/DL — SIGNIFICANT CHANGE UP (ref 12–16)
MAGNESIUM SERPL-MCNC: 2.2 MG/DL — SIGNIFICANT CHANGE UP (ref 1.8–2.4)
MCHC RBC-ENTMCNC: 30.5 PG — SIGNIFICANT CHANGE UP (ref 27–31)
MCHC RBC-ENTMCNC: 31.6 G/DL — LOW (ref 32–37)
MCV RBC AUTO: 96.4 FL — SIGNIFICANT CHANGE UP (ref 81–99)
NRBC # BLD: 0 /100 WBCS — SIGNIFICANT CHANGE UP (ref 0–0)
PLATELET # BLD AUTO: 251 K/UL — SIGNIFICANT CHANGE UP (ref 130–400)
POTASSIUM SERPL-MCNC: 4.4 MMOL/L — SIGNIFICANT CHANGE UP (ref 3.5–5)
POTASSIUM SERPL-SCNC: 4.4 MMOL/L — SIGNIFICANT CHANGE UP (ref 3.5–5)
PROT SERPL-MCNC: 6.3 G/DL — SIGNIFICANT CHANGE UP (ref 6–8)
RBC # BLD: 4.2 M/UL — SIGNIFICANT CHANGE UP (ref 4.2–5.4)
RBC # FLD: 13.2 % — SIGNIFICANT CHANGE UP (ref 11.5–14.5)
SODIUM SERPL-SCNC: 143 MMOL/L — SIGNIFICANT CHANGE UP (ref 135–146)
VIT B12 SERPL-MCNC: 882 PG/ML — SIGNIFICANT CHANGE UP (ref 232–1245)
WBC # BLD: 6.14 K/UL — SIGNIFICANT CHANGE UP (ref 4.8–10.8)
WBC # FLD AUTO: 6.14 K/UL — SIGNIFICANT CHANGE UP (ref 4.8–10.8)

## 2019-10-15 RX ORDER — CARBIDOPA AND LEVODOPA 25; 100 MG/1; MG/1
1 TABLET ORAL
Refills: 0 | Status: DISCONTINUED | OUTPATIENT
Start: 2019-10-15 | End: 2019-10-29

## 2019-10-15 RX ORDER — ACETAMINOPHEN 500 MG
650 TABLET ORAL EVERY 6 HOURS
Refills: 0 | Status: DISCONTINUED | OUTPATIENT
Start: 2019-10-15 | End: 2019-10-29

## 2019-10-15 RX ADMIN — CARBIDOPA AND LEVODOPA 1 TABLET(S): 25; 100 TABLET ORAL at 16:07

## 2019-10-15 RX ADMIN — DONEPEZIL HYDROCHLORIDE 10 MILLIGRAM(S): 10 TABLET, FILM COATED ORAL at 21:14

## 2019-10-15 RX ADMIN — Medication 88 MICROGRAM(S): at 05:49

## 2019-10-15 RX ADMIN — CARBIDOPA AND LEVODOPA 1 TABLET(S): 25; 100 TABLET ORAL at 05:49

## 2019-10-15 RX ADMIN — PANTOPRAZOLE SODIUM 40 MILLIGRAM(S): 20 TABLET, DELAYED RELEASE ORAL at 05:51

## 2019-10-15 RX ADMIN — CARBIDOPA AND LEVODOPA 1 TABLET(S): 25; 100 TABLET ORAL at 21:14

## 2019-10-15 RX ADMIN — Medication 650 MILLIGRAM(S): at 12:40

## 2019-10-15 RX ADMIN — SENNA PLUS 2 TABLET(S): 8.6 TABLET ORAL at 21:14

## 2019-10-15 RX ADMIN — SIMVASTATIN 20 MILLIGRAM(S): 20 TABLET, FILM COATED ORAL at 21:14

## 2019-10-15 RX ADMIN — Medication 5 MILLIGRAM(S): at 21:14

## 2019-10-15 RX ADMIN — CARBIDOPA AND LEVODOPA 1 TABLET(S): 25; 100 TABLET ORAL at 11:19

## 2019-10-15 RX ADMIN — ENOXAPARIN SODIUM 40 MILLIGRAM(S): 100 INJECTION SUBCUTANEOUS at 11:19

## 2019-10-15 RX ADMIN — Medication 100 MILLIGRAM(S): at 05:49

## 2019-10-15 RX ADMIN — ESCITALOPRAM OXALATE 20 MILLIGRAM(S): 10 TABLET, FILM COATED ORAL at 11:19

## 2019-10-15 RX ADMIN — Medication 650 MILLIGRAM(S): at 11:20

## 2019-10-16 RX ORDER — ACETAMINOPHEN 500 MG
975 TABLET ORAL EVERY 8 HOURS
Refills: 0 | Status: DISCONTINUED | OUTPATIENT
Start: 2019-10-16 | End: 2019-10-29

## 2019-10-16 RX ADMIN — SENNA PLUS 2 TABLET(S): 8.6 TABLET ORAL at 21:17

## 2019-10-16 RX ADMIN — DONEPEZIL HYDROCHLORIDE 10 MILLIGRAM(S): 10 TABLET, FILM COATED ORAL at 21:17

## 2019-10-16 RX ADMIN — Medication 88 MICROGRAM(S): at 06:09

## 2019-10-16 RX ADMIN — CARBIDOPA AND LEVODOPA 1 TABLET(S): 25; 100 TABLET ORAL at 23:19

## 2019-10-16 RX ADMIN — Medication 650 MILLIGRAM(S): at 03:33

## 2019-10-16 RX ADMIN — CARBIDOPA AND LEVODOPA 1 TABLET(S): 25; 100 TABLET ORAL at 11:01

## 2019-10-16 RX ADMIN — Medication 650 MILLIGRAM(S): at 12:26

## 2019-10-16 RX ADMIN — Medication 650 MILLIGRAM(S): at 17:41

## 2019-10-16 RX ADMIN — Medication 975 MILLIGRAM(S): at 23:18

## 2019-10-16 RX ADMIN — PANTOPRAZOLE SODIUM 40 MILLIGRAM(S): 20 TABLET, DELAYED RELEASE ORAL at 06:09

## 2019-10-16 RX ADMIN — CARBIDOPA AND LEVODOPA 1 TABLET(S): 25; 100 TABLET ORAL at 16:37

## 2019-10-16 RX ADMIN — Medication 975 MILLIGRAM(S): at 21:20

## 2019-10-16 RX ADMIN — Medication 100 MILLIGRAM(S): at 17:41

## 2019-10-16 RX ADMIN — CARBIDOPA AND LEVODOPA 1 TABLET(S): 25; 100 TABLET ORAL at 06:09

## 2019-10-16 RX ADMIN — ESCITALOPRAM OXALATE 20 MILLIGRAM(S): 10 TABLET, FILM COATED ORAL at 11:01

## 2019-10-16 RX ADMIN — Medication 650 MILLIGRAM(S): at 11:01

## 2019-10-16 RX ADMIN — Medication 100 MILLIGRAM(S): at 06:09

## 2019-10-16 RX ADMIN — Medication 5 MILLIGRAM(S): at 21:20

## 2019-10-16 RX ADMIN — ENOXAPARIN SODIUM 40 MILLIGRAM(S): 100 INJECTION SUBCUTANEOUS at 11:01

## 2019-10-16 RX ADMIN — CARBIDOPA AND LEVODOPA 1 TABLET(S): 25; 100 TABLET ORAL at 00:09

## 2019-10-16 RX ADMIN — Medication 650 MILLIGRAM(S): at 19:30

## 2019-10-16 RX ADMIN — SIMVASTATIN 20 MILLIGRAM(S): 20 TABLET, FILM COATED ORAL at 21:16

## 2019-10-16 RX ADMIN — CARBIDOPA AND LEVODOPA 1 TABLET(S): 25; 100 TABLET ORAL at 21:16

## 2019-10-17 PROCEDURE — 73000 X-RAY EXAM OF COLLAR BONE: CPT | Mod: 26,LT

## 2019-10-17 RX ADMIN — CARBIDOPA AND LEVODOPA 1 TABLET(S): 25; 100 TABLET ORAL at 17:56

## 2019-10-17 RX ADMIN — Medication 650 MILLIGRAM(S): at 11:03

## 2019-10-17 RX ADMIN — Medication 975 MILLIGRAM(S): at 21:46

## 2019-10-17 RX ADMIN — Medication 100 MILLIGRAM(S): at 17:55

## 2019-10-17 RX ADMIN — Medication 650 MILLIGRAM(S): at 10:43

## 2019-10-17 RX ADMIN — ENOXAPARIN SODIUM 40 MILLIGRAM(S): 100 INJECTION SUBCUTANEOUS at 12:04

## 2019-10-17 RX ADMIN — Medication 975 MILLIGRAM(S): at 21:18

## 2019-10-17 RX ADMIN — Medication 975 MILLIGRAM(S): at 19:42

## 2019-10-17 RX ADMIN — Medication 975 MILLIGRAM(S): at 06:07

## 2019-10-17 RX ADMIN — CARBIDOPA AND LEVODOPA 1 TABLET(S): 25; 100 TABLET ORAL at 21:18

## 2019-10-17 RX ADMIN — Medication 975 MILLIGRAM(S): at 13:18

## 2019-10-17 RX ADMIN — ESCITALOPRAM OXALATE 20 MILLIGRAM(S): 10 TABLET, FILM COATED ORAL at 12:04

## 2019-10-17 RX ADMIN — SIMVASTATIN 20 MILLIGRAM(S): 20 TABLET, FILM COATED ORAL at 21:17

## 2019-10-17 RX ADMIN — CARBIDOPA AND LEVODOPA 1 TABLET(S): 25; 100 TABLET ORAL at 12:04

## 2019-10-17 RX ADMIN — Medication 5 MILLIGRAM(S): at 21:19

## 2019-10-17 RX ADMIN — DONEPEZIL HYDROCHLORIDE 10 MILLIGRAM(S): 10 TABLET, FILM COATED ORAL at 21:19

## 2019-10-17 RX ADMIN — CARBIDOPA AND LEVODOPA 1 TABLET(S): 25; 100 TABLET ORAL at 06:07

## 2019-10-17 RX ADMIN — PANTOPRAZOLE SODIUM 40 MILLIGRAM(S): 20 TABLET, DELAYED RELEASE ORAL at 06:07

## 2019-10-17 RX ADMIN — CARBIDOPA AND LEVODOPA 1 TABLET(S): 25; 100 TABLET ORAL at 23:17

## 2019-10-17 RX ADMIN — Medication 100 MILLIGRAM(S): at 06:07

## 2019-10-17 RX ADMIN — Medication 88 MICROGRAM(S): at 06:07

## 2019-10-17 RX ADMIN — SENNA PLUS 2 TABLET(S): 8.6 TABLET ORAL at 21:19

## 2019-10-18 LAB
ANION GAP SERPL CALC-SCNC: 12 MMOL/L — SIGNIFICANT CHANGE UP (ref 7–14)
BASOPHILS # BLD AUTO: 0.07 K/UL — SIGNIFICANT CHANGE UP (ref 0–0.2)
BASOPHILS NFR BLD AUTO: 1.1 % — HIGH (ref 0–1)
BUN SERPL-MCNC: 19 MG/DL — SIGNIFICANT CHANGE UP (ref 10–20)
CALCIUM SERPL-MCNC: 9.2 MG/DL — SIGNIFICANT CHANGE UP (ref 8.5–10.1)
CHLORIDE SERPL-SCNC: 100 MMOL/L — SIGNIFICANT CHANGE UP (ref 98–110)
CO2 SERPL-SCNC: 29 MMOL/L — SIGNIFICANT CHANGE UP (ref 17–32)
CREAT SERPL-MCNC: 0.8 MG/DL — SIGNIFICANT CHANGE UP (ref 0.7–1.5)
EOSINOPHIL # BLD AUTO: 0.24 K/UL — SIGNIFICANT CHANGE UP (ref 0–0.7)
EOSINOPHIL NFR BLD AUTO: 3.9 % — SIGNIFICANT CHANGE UP (ref 0–8)
GLUCOSE SERPL-MCNC: 87 MG/DL — SIGNIFICANT CHANGE UP (ref 70–99)
HCT VFR BLD CALC: 39 % — SIGNIFICANT CHANGE UP (ref 37–47)
HGB BLD-MCNC: 12.4 G/DL — SIGNIFICANT CHANGE UP (ref 12–16)
IMM GRANULOCYTES NFR BLD AUTO: 0.7 % — HIGH (ref 0.1–0.3)
LYMPHOCYTES # BLD AUTO: 1.88 K/UL — SIGNIFICANT CHANGE UP (ref 1.2–3.4)
LYMPHOCYTES # BLD AUTO: 30.8 % — SIGNIFICANT CHANGE UP (ref 20.5–51.1)
MAGNESIUM SERPL-MCNC: 2.3 MG/DL — SIGNIFICANT CHANGE UP (ref 1.8–2.4)
MCHC RBC-ENTMCNC: 30.9 PG — SIGNIFICANT CHANGE UP (ref 27–31)
MCHC RBC-ENTMCNC: 31.8 G/DL — LOW (ref 32–37)
MCV RBC AUTO: 97.3 FL — SIGNIFICANT CHANGE UP (ref 81–99)
MONOCYTES # BLD AUTO: 0.69 K/UL — HIGH (ref 0.1–0.6)
MONOCYTES NFR BLD AUTO: 11.3 % — HIGH (ref 1.7–9.3)
NEUTROPHILS # BLD AUTO: 3.19 K/UL — SIGNIFICANT CHANGE UP (ref 1.4–6.5)
NEUTROPHILS NFR BLD AUTO: 52.2 % — SIGNIFICANT CHANGE UP (ref 42.2–75.2)
NRBC # BLD: 0 /100 WBCS — SIGNIFICANT CHANGE UP (ref 0–0)
PLATELET # BLD AUTO: 296 K/UL — SIGNIFICANT CHANGE UP (ref 130–400)
POTASSIUM SERPL-MCNC: 5 MMOL/L — SIGNIFICANT CHANGE UP (ref 3.5–5)
POTASSIUM SERPL-SCNC: 5 MMOL/L — SIGNIFICANT CHANGE UP (ref 3.5–5)
RBC # BLD: 4.01 M/UL — LOW (ref 4.2–5.4)
RBC # FLD: 13.3 % — SIGNIFICANT CHANGE UP (ref 11.5–14.5)
SODIUM SERPL-SCNC: 141 MMOL/L — SIGNIFICANT CHANGE UP (ref 135–146)
T4 FREE SERPL-MCNC: 1.6 NG/DL — SIGNIFICANT CHANGE UP (ref 0.9–1.8)
TSH SERPL-MCNC: 1.59 UIU/ML — SIGNIFICANT CHANGE UP (ref 0.27–4.2)
WBC # BLD: 6.11 K/UL — SIGNIFICANT CHANGE UP (ref 4.8–10.8)
WBC # FLD AUTO: 6.11 K/UL — SIGNIFICANT CHANGE UP (ref 4.8–10.8)

## 2019-10-18 RX ADMIN — SENNA PLUS 2 TABLET(S): 8.6 TABLET ORAL at 22:17

## 2019-10-18 RX ADMIN — CARBIDOPA AND LEVODOPA 1 TABLET(S): 25; 100 TABLET ORAL at 15:32

## 2019-10-18 RX ADMIN — DONEPEZIL HYDROCHLORIDE 10 MILLIGRAM(S): 10 TABLET, FILM COATED ORAL at 22:17

## 2019-10-18 RX ADMIN — Medication 975 MILLIGRAM(S): at 06:03

## 2019-10-18 RX ADMIN — Medication 650 MILLIGRAM(S): at 04:11

## 2019-10-18 RX ADMIN — PANTOPRAZOLE SODIUM 40 MILLIGRAM(S): 20 TABLET, DELAYED RELEASE ORAL at 06:04

## 2019-10-18 RX ADMIN — CARBIDOPA AND LEVODOPA 1 TABLET(S): 25; 100 TABLET ORAL at 11:56

## 2019-10-18 RX ADMIN — Medication 975 MILLIGRAM(S): at 06:10

## 2019-10-18 RX ADMIN — CARBIDOPA AND LEVODOPA 1 TABLET(S): 25; 100 TABLET ORAL at 06:03

## 2019-10-18 RX ADMIN — ENOXAPARIN SODIUM 40 MILLIGRAM(S): 100 INJECTION SUBCUTANEOUS at 11:56

## 2019-10-18 RX ADMIN — Medication 975 MILLIGRAM(S): at 13:08

## 2019-10-18 RX ADMIN — Medication 5 MILLIGRAM(S): at 22:16

## 2019-10-18 RX ADMIN — CARBIDOPA AND LEVODOPA 1 TABLET(S): 25; 100 TABLET ORAL at 22:16

## 2019-10-18 RX ADMIN — Medication 650 MILLIGRAM(S): at 09:25

## 2019-10-18 RX ADMIN — Medication 650 MILLIGRAM(S): at 06:05

## 2019-10-18 RX ADMIN — SIMVASTATIN 20 MILLIGRAM(S): 20 TABLET, FILM COATED ORAL at 22:16

## 2019-10-18 RX ADMIN — Medication 100 MILLIGRAM(S): at 06:04

## 2019-10-18 RX ADMIN — Medication 650 MILLIGRAM(S): at 13:10

## 2019-10-18 RX ADMIN — ESCITALOPRAM OXALATE 20 MILLIGRAM(S): 10 TABLET, FILM COATED ORAL at 11:56

## 2019-10-18 RX ADMIN — Medication 88 MICROGRAM(S): at 06:04

## 2019-10-18 RX ADMIN — Medication 975 MILLIGRAM(S): at 15:31

## 2019-10-18 RX ADMIN — Medication 975 MILLIGRAM(S): at 22:16

## 2019-10-18 RX ADMIN — CARBIDOPA AND LEVODOPA 1 TABLET(S): 25; 100 TABLET ORAL at 23:29

## 2019-10-18 RX ADMIN — Medication 975 MILLIGRAM(S): at 00:00

## 2019-10-19 RX ADMIN — DONEPEZIL HYDROCHLORIDE 10 MILLIGRAM(S): 10 TABLET, FILM COATED ORAL at 21:43

## 2019-10-19 RX ADMIN — CARBIDOPA AND LEVODOPA 1 TABLET(S): 25; 100 TABLET ORAL at 05:27

## 2019-10-19 RX ADMIN — Medication 5 MILLIGRAM(S): at 21:41

## 2019-10-19 RX ADMIN — ENOXAPARIN SODIUM 40 MILLIGRAM(S): 100 INJECTION SUBCUTANEOUS at 12:17

## 2019-10-19 RX ADMIN — CARBIDOPA AND LEVODOPA 1 TABLET(S): 25; 100 TABLET ORAL at 23:00

## 2019-10-19 RX ADMIN — Medication 650 MILLIGRAM(S): at 09:37

## 2019-10-19 RX ADMIN — Medication 88 MICROGRAM(S): at 05:27

## 2019-10-19 RX ADMIN — Medication 100 MILLIGRAM(S): at 17:23

## 2019-10-19 RX ADMIN — ESCITALOPRAM OXALATE 20 MILLIGRAM(S): 10 TABLET, FILM COATED ORAL at 12:17

## 2019-10-19 RX ADMIN — Medication 975 MILLIGRAM(S): at 05:27

## 2019-10-19 RX ADMIN — Medication 975 MILLIGRAM(S): at 14:01

## 2019-10-19 RX ADMIN — PANTOPRAZOLE SODIUM 40 MILLIGRAM(S): 20 TABLET, DELAYED RELEASE ORAL at 05:27

## 2019-10-19 RX ADMIN — CARBIDOPA AND LEVODOPA 1 TABLET(S): 25; 100 TABLET ORAL at 12:17

## 2019-10-19 RX ADMIN — SIMVASTATIN 20 MILLIGRAM(S): 20 TABLET, FILM COATED ORAL at 21:41

## 2019-10-19 RX ADMIN — CARBIDOPA AND LEVODOPA 1 TABLET(S): 25; 100 TABLET ORAL at 17:23

## 2019-10-19 RX ADMIN — Medication 975 MILLIGRAM(S): at 21:40

## 2019-10-19 RX ADMIN — CARBIDOPA AND LEVODOPA 1 TABLET(S): 25; 100 TABLET ORAL at 19:35

## 2019-10-19 RX ADMIN — Medication 100 MILLIGRAM(S): at 05:27

## 2019-10-19 RX ADMIN — SENNA PLUS 2 TABLET(S): 8.6 TABLET ORAL at 21:41

## 2019-10-19 RX ADMIN — Medication 650 MILLIGRAM(S): at 09:34

## 2019-10-19 RX ADMIN — Medication 975 MILLIGRAM(S): at 14:00

## 2019-10-20 RX ADMIN — Medication 5 MILLIGRAM(S): at 21:38

## 2019-10-20 RX ADMIN — CARBIDOPA AND LEVODOPA 1 TABLET(S): 25; 100 TABLET ORAL at 05:26

## 2019-10-20 RX ADMIN — CARBIDOPA AND LEVODOPA 1 TABLET(S): 25; 100 TABLET ORAL at 17:36

## 2019-10-20 RX ADMIN — CARBIDOPA AND LEVODOPA 1 TABLET(S): 25; 100 TABLET ORAL at 19:43

## 2019-10-20 RX ADMIN — Medication 100 MILLIGRAM(S): at 05:26

## 2019-10-20 RX ADMIN — PANTOPRAZOLE SODIUM 40 MILLIGRAM(S): 20 TABLET, DELAYED RELEASE ORAL at 05:26

## 2019-10-20 RX ADMIN — SIMVASTATIN 20 MILLIGRAM(S): 20 TABLET, FILM COATED ORAL at 21:38

## 2019-10-20 RX ADMIN — CARBIDOPA AND LEVODOPA 1 TABLET(S): 25; 100 TABLET ORAL at 23:10

## 2019-10-20 RX ADMIN — Medication 975 MILLIGRAM(S): at 05:25

## 2019-10-20 RX ADMIN — ENOXAPARIN SODIUM 40 MILLIGRAM(S): 100 INJECTION SUBCUTANEOUS at 11:58

## 2019-10-20 RX ADMIN — Medication 975 MILLIGRAM(S): at 21:38

## 2019-10-20 RX ADMIN — SENNA PLUS 2 TABLET(S): 8.6 TABLET ORAL at 21:38

## 2019-10-20 RX ADMIN — Medication 975 MILLIGRAM(S): at 00:56

## 2019-10-20 RX ADMIN — Medication 88 MICROGRAM(S): at 05:26

## 2019-10-20 RX ADMIN — ESCITALOPRAM OXALATE 20 MILLIGRAM(S): 10 TABLET, FILM COATED ORAL at 11:15

## 2019-10-20 RX ADMIN — Medication 100 MILLIGRAM(S): at 17:37

## 2019-10-20 RX ADMIN — Medication 975 MILLIGRAM(S): at 13:12

## 2019-10-20 RX ADMIN — Medication 975 MILLIGRAM(S): at 13:13

## 2019-10-20 RX ADMIN — DONEPEZIL HYDROCHLORIDE 10 MILLIGRAM(S): 10 TABLET, FILM COATED ORAL at 21:39

## 2019-10-20 RX ADMIN — CARBIDOPA AND LEVODOPA 1 TABLET(S): 25; 100 TABLET ORAL at 11:15

## 2019-10-21 LAB
ALBUMIN SERPL ELPH-MCNC: 4.3 G/DL — SIGNIFICANT CHANGE UP (ref 3.5–5.2)
ALP SERPL-CCNC: 114 U/L — SIGNIFICANT CHANGE UP (ref 30–115)
ALT FLD-CCNC: <5 U/L — SIGNIFICANT CHANGE UP (ref 0–41)
ANION GAP SERPL CALC-SCNC: 11 MMOL/L — SIGNIFICANT CHANGE UP (ref 7–14)
AST SERPL-CCNC: 16 U/L — SIGNIFICANT CHANGE UP (ref 0–41)
BASOPHILS # BLD AUTO: 0.05 K/UL — SIGNIFICANT CHANGE UP (ref 0–0.2)
BASOPHILS NFR BLD AUTO: 1 % — SIGNIFICANT CHANGE UP (ref 0–1)
BILIRUB SERPL-MCNC: 0.4 MG/DL — SIGNIFICANT CHANGE UP (ref 0.2–1.2)
BUN SERPL-MCNC: 14 MG/DL — SIGNIFICANT CHANGE UP (ref 10–20)
CALCIUM SERPL-MCNC: 9.2 MG/DL — SIGNIFICANT CHANGE UP (ref 8.5–10.1)
CHLORIDE SERPL-SCNC: 99 MMOL/L — SIGNIFICANT CHANGE UP (ref 98–110)
CO2 SERPL-SCNC: 28 MMOL/L — SIGNIFICANT CHANGE UP (ref 17–32)
CREAT SERPL-MCNC: 0.6 MG/DL — LOW (ref 0.7–1.5)
EOSINOPHIL # BLD AUTO: 0.14 K/UL — SIGNIFICANT CHANGE UP (ref 0–0.7)
EOSINOPHIL NFR BLD AUTO: 2.7 % — SIGNIFICANT CHANGE UP (ref 0–8)
GLUCOSE SERPL-MCNC: 96 MG/DL — SIGNIFICANT CHANGE UP (ref 70–99)
HCT VFR BLD CALC: 42 % — SIGNIFICANT CHANGE UP (ref 37–47)
HGB BLD-MCNC: 13.3 G/DL — SIGNIFICANT CHANGE UP (ref 12–16)
IMM GRANULOCYTES NFR BLD AUTO: 0.6 % — HIGH (ref 0.1–0.3)
LYMPHOCYTES # BLD AUTO: 1.3 K/UL — SIGNIFICANT CHANGE UP (ref 1.2–3.4)
LYMPHOCYTES # BLD AUTO: 25.1 % — SIGNIFICANT CHANGE UP (ref 20.5–51.1)
MCHC RBC-ENTMCNC: 31 PG — SIGNIFICANT CHANGE UP (ref 27–31)
MCHC RBC-ENTMCNC: 31.7 G/DL — LOW (ref 32–37)
MCV RBC AUTO: 97.9 FL — SIGNIFICANT CHANGE UP (ref 81–99)
MONOCYTES # BLD AUTO: 0.55 K/UL — SIGNIFICANT CHANGE UP (ref 0.1–0.6)
MONOCYTES NFR BLD AUTO: 10.6 % — HIGH (ref 1.7–9.3)
NEUTROPHILS # BLD AUTO: 3.11 K/UL — SIGNIFICANT CHANGE UP (ref 1.4–6.5)
NEUTROPHILS NFR BLD AUTO: 60 % — SIGNIFICANT CHANGE UP (ref 42.2–75.2)
NRBC # BLD: 0 /100 WBCS — SIGNIFICANT CHANGE UP (ref 0–0)
PLATELET # BLD AUTO: 316 K/UL — SIGNIFICANT CHANGE UP (ref 130–400)
POTASSIUM SERPL-MCNC: 4 MMOL/L — SIGNIFICANT CHANGE UP (ref 3.5–5)
POTASSIUM SERPL-SCNC: 4 MMOL/L — SIGNIFICANT CHANGE UP (ref 3.5–5)
PROT SERPL-MCNC: 6.5 G/DL — SIGNIFICANT CHANGE UP (ref 6–8)
RBC # BLD: 4.29 M/UL — SIGNIFICANT CHANGE UP (ref 4.2–5.4)
RBC # FLD: 13.3 % — SIGNIFICANT CHANGE UP (ref 11.5–14.5)
SODIUM SERPL-SCNC: 138 MMOL/L — SIGNIFICANT CHANGE UP (ref 135–146)
WBC # BLD: 5.18 K/UL — SIGNIFICANT CHANGE UP (ref 4.8–10.8)
WBC # FLD AUTO: 5.18 K/UL — SIGNIFICANT CHANGE UP (ref 4.8–10.8)

## 2019-10-21 RX ADMIN — ESCITALOPRAM OXALATE 20 MILLIGRAM(S): 10 TABLET, FILM COATED ORAL at 12:37

## 2019-10-21 RX ADMIN — CARBIDOPA AND LEVODOPA 1 TABLET(S): 25; 100 TABLET ORAL at 17:21

## 2019-10-21 RX ADMIN — ENOXAPARIN SODIUM 40 MILLIGRAM(S): 100 INJECTION SUBCUTANEOUS at 12:39

## 2019-10-21 RX ADMIN — Medication 975 MILLIGRAM(S): at 22:30

## 2019-10-21 RX ADMIN — CARBIDOPA AND LEVODOPA 1 TABLET(S): 25; 100 TABLET ORAL at 05:11

## 2019-10-21 RX ADMIN — Medication 100 MILLIGRAM(S): at 17:21

## 2019-10-21 RX ADMIN — Medication 100 MILLIGRAM(S): at 05:11

## 2019-10-21 RX ADMIN — Medication 88 MICROGRAM(S): at 05:12

## 2019-10-21 RX ADMIN — CARBIDOPA AND LEVODOPA 1 TABLET(S): 25; 100 TABLET ORAL at 12:37

## 2019-10-21 RX ADMIN — CARBIDOPA AND LEVODOPA 1 TABLET(S): 25; 100 TABLET ORAL at 22:56

## 2019-10-21 RX ADMIN — DONEPEZIL HYDROCHLORIDE 10 MILLIGRAM(S): 10 TABLET, FILM COATED ORAL at 21:34

## 2019-10-21 RX ADMIN — SENNA PLUS 2 TABLET(S): 8.6 TABLET ORAL at 21:30

## 2019-10-21 RX ADMIN — CARBIDOPA AND LEVODOPA 1 TABLET(S): 25; 100 TABLET ORAL at 19:45

## 2019-10-21 RX ADMIN — PANTOPRAZOLE SODIUM 40 MILLIGRAM(S): 20 TABLET, DELAYED RELEASE ORAL at 05:12

## 2019-10-21 RX ADMIN — Medication 975 MILLIGRAM(S): at 15:16

## 2019-10-21 RX ADMIN — Medication 975 MILLIGRAM(S): at 07:50

## 2019-10-21 RX ADMIN — Medication 975 MILLIGRAM(S): at 21:31

## 2019-10-21 RX ADMIN — Medication 975 MILLIGRAM(S): at 15:14

## 2019-10-21 RX ADMIN — Medication 975 MILLIGRAM(S): at 05:11

## 2019-10-21 RX ADMIN — SIMVASTATIN 20 MILLIGRAM(S): 20 TABLET, FILM COATED ORAL at 21:30

## 2019-10-21 RX ADMIN — Medication 5 MILLIGRAM(S): at 21:31

## 2019-10-22 DIAGNOSIS — I35.1 NONRHEUMATIC AORTIC (VALVE) INSUFFICIENCY: ICD-10-CM

## 2019-10-22 DIAGNOSIS — M48.02 SPINAL STENOSIS, CERVICAL REGION: ICD-10-CM

## 2019-10-22 DIAGNOSIS — R54 AGE-RELATED PHYSICAL DEBILITY: ICD-10-CM

## 2019-10-22 DIAGNOSIS — E78.5 HYPERLIPIDEMIA, UNSPECIFIED: ICD-10-CM

## 2019-10-22 DIAGNOSIS — R55 SYNCOPE AND COLLAPSE: ICD-10-CM

## 2019-10-22 DIAGNOSIS — R93.89 ABNORMAL FINDINGS ON DIAGNOSTIC IMAGING OF OTHER SPECIFIED BODY STRUCTURES: ICD-10-CM

## 2019-10-22 DIAGNOSIS — I71.2 THORACIC AORTIC ANEURYSM, WITHOUT RUPTURE: ICD-10-CM

## 2019-10-22 DIAGNOSIS — D64.9 ANEMIA, UNSPECIFIED: ICD-10-CM
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
## 2019-10-22 DIAGNOSIS — S22.42XA MULTIPLE FRACTURES OF RIBS, LEFT SIDE, INITIAL ENCOUNTER FOR CLOSED FRACTURE: ICD-10-CM

## 2019-10-22 DIAGNOSIS — N39.0 URINARY TRACT INFECTION, SITE NOT SPECIFIED: ICD-10-CM
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
## 2019-10-22 DIAGNOSIS — Z23 ENCOUNTER FOR IMMUNIZATION: ICD-10-CM

## 2019-10-22 DIAGNOSIS — M25.512 PAIN IN LEFT SHOULDER: ICD-10-CM

## 2019-10-22 DIAGNOSIS — N18.2 CHRONIC KIDNEY DISEASE, STAGE 2 (MILD): ICD-10-CM

## 2019-10-22 DIAGNOSIS — N17.9 ACUTE KIDNEY FAILURE, UNSPECIFIED: ICD-10-CM

## 2019-10-22 DIAGNOSIS — E03.9 HYPOTHYROIDISM, UNSPECIFIED: ICD-10-CM

## 2019-10-22 DIAGNOSIS — G20 PARKINSON'S DISEASE: ICD-10-CM

## 2019-10-22 DIAGNOSIS — S42.022A DISPLACED FRACTURE OF SHAFT OF LEFT CLAVICLE, INITIAL ENCOUNTER FOR CLOSED FRACTURE: ICD-10-CM

## 2019-10-22 RX ADMIN — ENOXAPARIN SODIUM 40 MILLIGRAM(S): 100 INJECTION SUBCUTANEOUS at 11:21

## 2019-10-22 RX ADMIN — SENNA PLUS 2 TABLET(S): 8.6 TABLET ORAL at 21:17

## 2019-10-22 RX ADMIN — CARBIDOPA AND LEVODOPA 1 TABLET(S): 25; 100 TABLET ORAL at 11:20

## 2019-10-22 RX ADMIN — Medication 88 MICROGRAM(S): at 05:28

## 2019-10-22 RX ADMIN — Medication 975 MILLIGRAM(S): at 05:46

## 2019-10-22 RX ADMIN — Medication 100 MILLIGRAM(S): at 05:28

## 2019-10-22 RX ADMIN — PANTOPRAZOLE SODIUM 40 MILLIGRAM(S): 20 TABLET, DELAYED RELEASE ORAL at 05:28

## 2019-10-22 RX ADMIN — Medication 975 MILLIGRAM(S): at 21:18

## 2019-10-22 RX ADMIN — CARBIDOPA AND LEVODOPA 1 TABLET(S): 25; 100 TABLET ORAL at 20:02

## 2019-10-22 RX ADMIN — CARBIDOPA AND LEVODOPA 1 TABLET(S): 25; 100 TABLET ORAL at 23:26

## 2019-10-22 RX ADMIN — SIMVASTATIN 20 MILLIGRAM(S): 20 TABLET, FILM COATED ORAL at 21:18

## 2019-10-22 RX ADMIN — Medication 975 MILLIGRAM(S): at 14:05

## 2019-10-22 RX ADMIN — DONEPEZIL HYDROCHLORIDE 10 MILLIGRAM(S): 10 TABLET, FILM COATED ORAL at 21:18

## 2019-10-22 RX ADMIN — ESCITALOPRAM OXALATE 20 MILLIGRAM(S): 10 TABLET, FILM COATED ORAL at 11:21

## 2019-10-22 RX ADMIN — Medication 975 MILLIGRAM(S): at 13:35

## 2019-10-22 RX ADMIN — Medication 100 MILLIGRAM(S): at 17:22

## 2019-10-22 RX ADMIN — CARBIDOPA AND LEVODOPA 1 TABLET(S): 25; 100 TABLET ORAL at 16:00

## 2019-10-22 RX ADMIN — CARBIDOPA AND LEVODOPA 1 TABLET(S): 25; 100 TABLET ORAL at 05:28

## 2019-10-22 RX ADMIN — Medication 5 MILLIGRAM(S): at 21:17

## 2019-10-22 RX ADMIN — Medication 975 MILLIGRAM(S): at 22:00

## 2019-10-22 RX ADMIN — Medication 975 MILLIGRAM(S): at 05:29

## 2019-10-23 RX ADMIN — Medication 88 MICROGRAM(S): at 05:51

## 2019-10-23 RX ADMIN — Medication 975 MILLIGRAM(S): at 22:00

## 2019-10-23 RX ADMIN — CARBIDOPA AND LEVODOPA 1 TABLET(S): 25; 100 TABLET ORAL at 22:59

## 2019-10-23 RX ADMIN — CARBIDOPA AND LEVODOPA 1 TABLET(S): 25; 100 TABLET ORAL at 17:14

## 2019-10-23 RX ADMIN — CARBIDOPA AND LEVODOPA 1 TABLET(S): 25; 100 TABLET ORAL at 05:51

## 2019-10-23 RX ADMIN — SIMVASTATIN 20 MILLIGRAM(S): 20 TABLET, FILM COATED ORAL at 21:33

## 2019-10-23 RX ADMIN — CARBIDOPA AND LEVODOPA 1 TABLET(S): 25; 100 TABLET ORAL at 19:48

## 2019-10-23 RX ADMIN — SENNA PLUS 2 TABLET(S): 8.6 TABLET ORAL at 21:33

## 2019-10-23 RX ADMIN — CARBIDOPA AND LEVODOPA 1 TABLET(S): 25; 100 TABLET ORAL at 12:28

## 2019-10-23 RX ADMIN — ESCITALOPRAM OXALATE 20 MILLIGRAM(S): 10 TABLET, FILM COATED ORAL at 12:28

## 2019-10-23 RX ADMIN — Medication 975 MILLIGRAM(S): at 05:52

## 2019-10-23 RX ADMIN — Medication 10 MILLIGRAM(S): at 21:33

## 2019-10-23 RX ADMIN — Medication 100 MILLIGRAM(S): at 05:51

## 2019-10-23 RX ADMIN — PANTOPRAZOLE SODIUM 40 MILLIGRAM(S): 20 TABLET, DELAYED RELEASE ORAL at 05:52

## 2019-10-23 RX ADMIN — Medication 975 MILLIGRAM(S): at 13:54

## 2019-10-23 RX ADMIN — Medication 100 MILLIGRAM(S): at 17:14

## 2019-10-23 RX ADMIN — DONEPEZIL HYDROCHLORIDE 10 MILLIGRAM(S): 10 TABLET, FILM COATED ORAL at 21:35

## 2019-10-23 RX ADMIN — Medication 5 MILLIGRAM(S): at 21:33

## 2019-10-23 RX ADMIN — Medication 975 MILLIGRAM(S): at 21:33

## 2019-10-23 RX ADMIN — Medication 975 MILLIGRAM(S): at 13:55

## 2019-10-23 RX ADMIN — ENOXAPARIN SODIUM 40 MILLIGRAM(S): 100 INJECTION SUBCUTANEOUS at 12:29

## 2019-10-24 RX ADMIN — ENOXAPARIN SODIUM 40 MILLIGRAM(S): 100 INJECTION SUBCUTANEOUS at 11:35

## 2019-10-24 RX ADMIN — Medication 650 MILLIGRAM(S): at 12:23

## 2019-10-24 RX ADMIN — Medication 975 MILLIGRAM(S): at 06:12

## 2019-10-24 RX ADMIN — SENNA PLUS 2 TABLET(S): 8.6 TABLET ORAL at 21:32

## 2019-10-24 RX ADMIN — ESCITALOPRAM OXALATE 20 MILLIGRAM(S): 10 TABLET, FILM COATED ORAL at 11:35

## 2019-10-24 RX ADMIN — Medication 88 MICROGRAM(S): at 06:12

## 2019-10-24 RX ADMIN — Medication 650 MILLIGRAM(S): at 12:22

## 2019-10-24 RX ADMIN — Medication 975 MILLIGRAM(S): at 23:07

## 2019-10-24 RX ADMIN — DONEPEZIL HYDROCHLORIDE 10 MILLIGRAM(S): 10 TABLET, FILM COATED ORAL at 21:31

## 2019-10-24 RX ADMIN — CARBIDOPA AND LEVODOPA 1 TABLET(S): 25; 100 TABLET ORAL at 06:12

## 2019-10-24 RX ADMIN — CARBIDOPA AND LEVODOPA 1 TABLET(S): 25; 100 TABLET ORAL at 17:28

## 2019-10-24 RX ADMIN — Medication 5 MILLIGRAM(S): at 21:32

## 2019-10-24 RX ADMIN — SIMVASTATIN 20 MILLIGRAM(S): 20 TABLET, FILM COATED ORAL at 21:32

## 2019-10-24 RX ADMIN — CARBIDOPA AND LEVODOPA 1 TABLET(S): 25; 100 TABLET ORAL at 19:54

## 2019-10-24 RX ADMIN — PANTOPRAZOLE SODIUM 40 MILLIGRAM(S): 20 TABLET, DELAYED RELEASE ORAL at 06:12

## 2019-10-24 RX ADMIN — Medication 975 MILLIGRAM(S): at 21:31

## 2019-10-24 RX ADMIN — Medication 100 MILLIGRAM(S): at 06:13

## 2019-10-24 RX ADMIN — Medication 975 MILLIGRAM(S): at 06:28

## 2019-10-24 RX ADMIN — CARBIDOPA AND LEVODOPA 1 TABLET(S): 25; 100 TABLET ORAL at 23:06

## 2019-10-24 RX ADMIN — CARBIDOPA AND LEVODOPA 1 TABLET(S): 25; 100 TABLET ORAL at 11:35

## 2019-10-24 RX ADMIN — Medication 100 MILLIGRAM(S): at 17:28

## 2019-10-25 LAB
ANION GAP SERPL CALC-SCNC: 11 MMOL/L — SIGNIFICANT CHANGE UP (ref 7–14)
BASOPHILS # BLD AUTO: 0.05 K/UL — SIGNIFICANT CHANGE UP (ref 0–0.2)
BASOPHILS NFR BLD AUTO: 0.9 % — SIGNIFICANT CHANGE UP (ref 0–1)
BUN SERPL-MCNC: 14 MG/DL — SIGNIFICANT CHANGE UP (ref 10–20)
CALCIUM SERPL-MCNC: 9.3 MG/DL — SIGNIFICANT CHANGE UP (ref 8.5–10.1)
CHLORIDE SERPL-SCNC: 102 MMOL/L — SIGNIFICANT CHANGE UP (ref 98–110)
CO2 SERPL-SCNC: 28 MMOL/L — SIGNIFICANT CHANGE UP (ref 17–32)
CREAT SERPL-MCNC: 0.7 MG/DL — SIGNIFICANT CHANGE UP (ref 0.7–1.5)
EOSINOPHIL # BLD AUTO: 0.16 K/UL — SIGNIFICANT CHANGE UP (ref 0–0.7)
EOSINOPHIL NFR BLD AUTO: 3 % — SIGNIFICANT CHANGE UP (ref 0–8)
GLUCOSE SERPL-MCNC: 94 MG/DL — SIGNIFICANT CHANGE UP (ref 70–99)
HCT VFR BLD CALC: 38.4 % — SIGNIFICANT CHANGE UP (ref 37–47)
HGB BLD-MCNC: 12.2 G/DL — SIGNIFICANT CHANGE UP (ref 12–16)
IMM GRANULOCYTES NFR BLD AUTO: 0.4 % — HIGH (ref 0.1–0.3)
LYMPHOCYTES # BLD AUTO: 1.25 K/UL — SIGNIFICANT CHANGE UP (ref 1.2–3.4)
LYMPHOCYTES # BLD AUTO: 23.7 % — SIGNIFICANT CHANGE UP (ref 20.5–51.1)
MAGNESIUM SERPL-MCNC: 2.2 MG/DL — SIGNIFICANT CHANGE UP (ref 1.8–2.4)
MCHC RBC-ENTMCNC: 31 PG — SIGNIFICANT CHANGE UP (ref 27–31)
MCHC RBC-ENTMCNC: 31.8 G/DL — LOW (ref 32–37)
MCV RBC AUTO: 97.7 FL — SIGNIFICANT CHANGE UP (ref 81–99)
MONOCYTES # BLD AUTO: 0.61 K/UL — HIGH (ref 0.1–0.6)
MONOCYTES NFR BLD AUTO: 11.6 % — HIGH (ref 1.7–9.3)
NEUTROPHILS # BLD AUTO: 3.18 K/UL — SIGNIFICANT CHANGE UP (ref 1.4–6.5)
NEUTROPHILS NFR BLD AUTO: 60.4 % — SIGNIFICANT CHANGE UP (ref 42.2–75.2)
NRBC # BLD: 0 /100 WBCS — SIGNIFICANT CHANGE UP (ref 0–0)
PLATELET # BLD AUTO: 332 K/UL — SIGNIFICANT CHANGE UP (ref 130–400)
POTASSIUM SERPL-MCNC: 4.4 MMOL/L — SIGNIFICANT CHANGE UP (ref 3.5–5)
POTASSIUM SERPL-SCNC: 4.4 MMOL/L — SIGNIFICANT CHANGE UP (ref 3.5–5)
RBC # BLD: 3.93 M/UL — LOW (ref 4.2–5.4)
RBC # FLD: 13.6 % — SIGNIFICANT CHANGE UP (ref 11.5–14.5)
SODIUM SERPL-SCNC: 141 MMOL/L — SIGNIFICANT CHANGE UP (ref 135–146)
WBC # BLD: 5.27 K/UL — SIGNIFICANT CHANGE UP (ref 4.8–10.8)
WBC # FLD AUTO: 5.27 K/UL — SIGNIFICANT CHANGE UP (ref 4.8–10.8)

## 2019-10-25 RX ADMIN — CARBIDOPA AND LEVODOPA 1 TABLET(S): 25; 100 TABLET ORAL at 12:25

## 2019-10-25 RX ADMIN — CARBIDOPA AND LEVODOPA 1 TABLET(S): 25; 100 TABLET ORAL at 17:13

## 2019-10-25 RX ADMIN — ENOXAPARIN SODIUM 40 MILLIGRAM(S): 100 INJECTION SUBCUTANEOUS at 12:26

## 2019-10-25 RX ADMIN — Medication 975 MILLIGRAM(S): at 05:40

## 2019-10-25 RX ADMIN — Medication 100 MILLIGRAM(S): at 05:41

## 2019-10-25 RX ADMIN — Medication 975 MILLIGRAM(S): at 16:14

## 2019-10-25 RX ADMIN — CARBIDOPA AND LEVODOPA 1 TABLET(S): 25; 100 TABLET ORAL at 05:41

## 2019-10-25 RX ADMIN — Medication 975 MILLIGRAM(S): at 23:24

## 2019-10-25 RX ADMIN — Medication 975 MILLIGRAM(S): at 21:58

## 2019-10-25 RX ADMIN — SIMVASTATIN 20 MILLIGRAM(S): 20 TABLET, FILM COATED ORAL at 21:58

## 2019-10-25 RX ADMIN — CARBIDOPA AND LEVODOPA 1 TABLET(S): 25; 100 TABLET ORAL at 21:56

## 2019-10-25 RX ADMIN — PANTOPRAZOLE SODIUM 40 MILLIGRAM(S): 20 TABLET, DELAYED RELEASE ORAL at 05:41

## 2019-10-25 RX ADMIN — ESCITALOPRAM OXALATE 20 MILLIGRAM(S): 10 TABLET, FILM COATED ORAL at 12:25

## 2019-10-25 RX ADMIN — SENNA PLUS 2 TABLET(S): 8.6 TABLET ORAL at 21:58

## 2019-10-25 RX ADMIN — Medication 88 MICROGRAM(S): at 05:41

## 2019-10-25 RX ADMIN — DONEPEZIL HYDROCHLORIDE 10 MILLIGRAM(S): 10 TABLET, FILM COATED ORAL at 21:59

## 2019-10-25 RX ADMIN — Medication 100 MILLIGRAM(S): at 17:13

## 2019-10-25 RX ADMIN — Medication 5 MILLIGRAM(S): at 21:58

## 2019-10-25 RX ADMIN — Medication 975 MILLIGRAM(S): at 12:26

## 2019-10-26 RX ADMIN — CARBIDOPA AND LEVODOPA 1 TABLET(S): 25; 100 TABLET ORAL at 12:23

## 2019-10-26 RX ADMIN — CARBIDOPA AND LEVODOPA 1 TABLET(S): 25; 100 TABLET ORAL at 05:32

## 2019-10-26 RX ADMIN — SIMVASTATIN 20 MILLIGRAM(S): 20 TABLET, FILM COATED ORAL at 21:41

## 2019-10-26 RX ADMIN — Medication 88 MICROGRAM(S): at 05:32

## 2019-10-26 RX ADMIN — Medication 100 MILLIGRAM(S): at 05:32

## 2019-10-26 RX ADMIN — ENOXAPARIN SODIUM 40 MILLIGRAM(S): 100 INJECTION SUBCUTANEOUS at 12:24

## 2019-10-26 RX ADMIN — CARBIDOPA AND LEVODOPA 1 TABLET(S): 25; 100 TABLET ORAL at 17:41

## 2019-10-26 RX ADMIN — PANTOPRAZOLE SODIUM 40 MILLIGRAM(S): 20 TABLET, DELAYED RELEASE ORAL at 05:32

## 2019-10-26 RX ADMIN — Medication 975 MILLIGRAM(S): at 21:38

## 2019-10-26 RX ADMIN — CARBIDOPA AND LEVODOPA 1 TABLET(S): 25; 100 TABLET ORAL at 20:31

## 2019-10-26 RX ADMIN — CARBIDOPA AND LEVODOPA 1 TABLET(S): 25; 100 TABLET ORAL at 23:18

## 2019-10-26 RX ADMIN — ESCITALOPRAM OXALATE 20 MILLIGRAM(S): 10 TABLET, FILM COATED ORAL at 12:24

## 2019-10-26 RX ADMIN — DONEPEZIL HYDROCHLORIDE 10 MILLIGRAM(S): 10 TABLET, FILM COATED ORAL at 21:39

## 2019-10-26 RX ADMIN — Medication 5 MILLIGRAM(S): at 21:39

## 2019-10-26 RX ADMIN — Medication 975 MILLIGRAM(S): at 05:32

## 2019-10-26 RX ADMIN — Medication 100 MILLIGRAM(S): at 17:41

## 2019-10-26 RX ADMIN — SENNA PLUS 2 TABLET(S): 8.6 TABLET ORAL at 21:40

## 2019-10-26 RX ADMIN — Medication 975 MILLIGRAM(S): at 22:30

## 2019-10-26 RX ADMIN — CARBIDOPA AND LEVODOPA 1 TABLET(S): 25; 100 TABLET ORAL at 05:29

## 2019-10-27 RX ADMIN — CARBIDOPA AND LEVODOPA 1 TABLET(S): 25; 100 TABLET ORAL at 12:55

## 2019-10-27 RX ADMIN — CARBIDOPA AND LEVODOPA 1 TABLET(S): 25; 100 TABLET ORAL at 17:16

## 2019-10-27 RX ADMIN — CARBIDOPA AND LEVODOPA 1 TABLET(S): 25; 100 TABLET ORAL at 05:49

## 2019-10-27 RX ADMIN — Medication 100 MILLIGRAM(S): at 17:16

## 2019-10-27 RX ADMIN — Medication 88 MICROGRAM(S): at 05:49

## 2019-10-27 RX ADMIN — Medication 650 MILLIGRAM(S): at 17:17

## 2019-10-27 RX ADMIN — PANTOPRAZOLE SODIUM 40 MILLIGRAM(S): 20 TABLET, DELAYED RELEASE ORAL at 05:49

## 2019-10-27 RX ADMIN — SIMVASTATIN 20 MILLIGRAM(S): 20 TABLET, FILM COATED ORAL at 21:33

## 2019-10-27 RX ADMIN — Medication 975 MILLIGRAM(S): at 22:03

## 2019-10-27 RX ADMIN — ENOXAPARIN SODIUM 40 MILLIGRAM(S): 100 INJECTION SUBCUTANEOUS at 12:55

## 2019-10-27 RX ADMIN — CARBIDOPA AND LEVODOPA 1 TABLET(S): 25; 100 TABLET ORAL at 20:16

## 2019-10-27 RX ADMIN — ESCITALOPRAM OXALATE 20 MILLIGRAM(S): 10 TABLET, FILM COATED ORAL at 12:55

## 2019-10-27 RX ADMIN — Medication 975 MILLIGRAM(S): at 21:33

## 2019-10-27 RX ADMIN — SENNA PLUS 2 TABLET(S): 8.6 TABLET ORAL at 21:33

## 2019-10-27 RX ADMIN — Medication 5 MILLIGRAM(S): at 21:32

## 2019-10-27 RX ADMIN — Medication 100 MILLIGRAM(S): at 05:49

## 2019-10-27 RX ADMIN — DONEPEZIL HYDROCHLORIDE 10 MILLIGRAM(S): 10 TABLET, FILM COATED ORAL at 22:02

## 2019-10-27 RX ADMIN — Medication 975 MILLIGRAM(S): at 06:27

## 2019-10-27 RX ADMIN — Medication 975 MILLIGRAM(S): at 05:49

## 2019-10-27 RX ADMIN — CARBIDOPA AND LEVODOPA 1 TABLET(S): 25; 100 TABLET ORAL at 23:08

## 2019-10-28 ENCOUNTER — TRANSCRIPTION ENCOUNTER (OUTPATIENT)
Age: 79
End: 2019-10-28

## 2019-10-28 LAB
ALBUMIN SERPL ELPH-MCNC: 3.8 G/DL — SIGNIFICANT CHANGE UP (ref 3.5–5.2)
ALP SERPL-CCNC: 92 U/L — SIGNIFICANT CHANGE UP (ref 30–115)
ALT FLD-CCNC: <5 U/L — SIGNIFICANT CHANGE UP (ref 0–41)
ANION GAP SERPL CALC-SCNC: 13 MMOL/L — SIGNIFICANT CHANGE UP (ref 7–14)
AST SERPL-CCNC: 13 U/L — SIGNIFICANT CHANGE UP (ref 0–41)
BASOPHILS # BLD AUTO: 0.07 K/UL — SIGNIFICANT CHANGE UP (ref 0–0.2)
BASOPHILS NFR BLD AUTO: 1.4 % — HIGH (ref 0–1)
BILIRUB SERPL-MCNC: 0.3 MG/DL — SIGNIFICANT CHANGE UP (ref 0.2–1.2)
BUN SERPL-MCNC: 22 MG/DL — HIGH (ref 10–20)
CALCIUM SERPL-MCNC: 9.3 MG/DL — SIGNIFICANT CHANGE UP (ref 8.5–10.1)
CHLORIDE SERPL-SCNC: 100 MMOL/L — SIGNIFICANT CHANGE UP (ref 98–110)
CO2 SERPL-SCNC: 27 MMOL/L — SIGNIFICANT CHANGE UP (ref 17–32)
CREAT SERPL-MCNC: 0.9 MG/DL — SIGNIFICANT CHANGE UP (ref 0.7–1.5)
EOSINOPHIL # BLD AUTO: 0.21 K/UL — SIGNIFICANT CHANGE UP (ref 0–0.7)
EOSINOPHIL NFR BLD AUTO: 4.3 % — SIGNIFICANT CHANGE UP (ref 0–8)
GLUCOSE SERPL-MCNC: 89 MG/DL — SIGNIFICANT CHANGE UP (ref 70–99)
HCT VFR BLD CALC: 38 % — SIGNIFICANT CHANGE UP (ref 37–47)
HGB BLD-MCNC: 11.8 G/DL — LOW (ref 12–16)
IMM GRANULOCYTES NFR BLD AUTO: 0.4 % — HIGH (ref 0.1–0.3)
LYMPHOCYTES # BLD AUTO: 1.64 K/UL — SIGNIFICANT CHANGE UP (ref 1.2–3.4)
LYMPHOCYTES # BLD AUTO: 33.2 % — SIGNIFICANT CHANGE UP (ref 20.5–51.1)
MCHC RBC-ENTMCNC: 30.8 PG — SIGNIFICANT CHANGE UP (ref 27–31)
MCHC RBC-ENTMCNC: 31.1 G/DL — LOW (ref 32–37)
MCV RBC AUTO: 99.2 FL — HIGH (ref 81–99)
MONOCYTES # BLD AUTO: 0.66 K/UL — HIGH (ref 0.1–0.6)
MONOCYTES NFR BLD AUTO: 13.4 % — HIGH (ref 1.7–9.3)
NEUTROPHILS # BLD AUTO: 2.34 K/UL — SIGNIFICANT CHANGE UP (ref 1.4–6.5)
NEUTROPHILS NFR BLD AUTO: 47.3 % — SIGNIFICANT CHANGE UP (ref 42.2–75.2)
NRBC # BLD: 0 /100 WBCS — SIGNIFICANT CHANGE UP (ref 0–0)
PLATELET # BLD AUTO: 320 K/UL — SIGNIFICANT CHANGE UP (ref 130–400)
POTASSIUM SERPL-MCNC: 4.3 MMOL/L — SIGNIFICANT CHANGE UP (ref 3.5–5)
POTASSIUM SERPL-SCNC: 4.3 MMOL/L — SIGNIFICANT CHANGE UP (ref 3.5–5)
PROT SERPL-MCNC: 5.9 G/DL — LOW (ref 6–8)
RBC # BLD: 3.83 M/UL — LOW (ref 4.2–5.4)
RBC # FLD: 14.1 % — SIGNIFICANT CHANGE UP (ref 11.5–14.5)
SODIUM SERPL-SCNC: 140 MMOL/L — SIGNIFICANT CHANGE UP (ref 135–146)
WBC # BLD: 4.94 K/UL — SIGNIFICANT CHANGE UP (ref 4.8–10.8)
WBC # FLD AUTO: 4.94 K/UL — SIGNIFICANT CHANGE UP (ref 4.8–10.8)

## 2019-10-28 RX ORDER — PANTOPRAZOLE SODIUM 20 MG/1
1 TABLET, DELAYED RELEASE ORAL
Qty: 0 | Refills: 0 | DISCHARGE
Start: 2019-10-28

## 2019-10-28 RX ORDER — ESCITALOPRAM OXALATE 10 MG/1
0 TABLET, FILM COATED ORAL
Qty: 90 | Refills: 0 | DISCHARGE

## 2019-10-28 RX ORDER — ACETAMINOPHEN 500 MG
2 TABLET ORAL
Qty: 0 | Refills: 0 | DISCHARGE
Start: 2019-10-28

## 2019-10-28 RX ORDER — LEVOTHYROXINE SODIUM 125 MCG
0 TABLET ORAL
Qty: 90 | Refills: 0 | DISCHARGE

## 2019-10-28 RX ORDER — LANOLIN ALCOHOL/MO/W.PET/CERES
1 CREAM (GRAM) TOPICAL
Qty: 0 | Refills: 0 | DISCHARGE
Start: 2019-10-28

## 2019-10-28 RX ORDER — DONEPEZIL HYDROCHLORIDE 10 MG/1
0 TABLET, FILM COATED ORAL
Qty: 90 | Refills: 0 | DISCHARGE

## 2019-10-28 RX ORDER — SENNA PLUS 8.6 MG/1
2 TABLET ORAL
Qty: 0 | Refills: 0 | DISCHARGE
Start: 2019-10-28

## 2019-10-28 RX ORDER — ENOXAPARIN SODIUM 100 MG/ML
40 INJECTION SUBCUTANEOUS
Qty: 0 | Refills: 0 | DISCHARGE
Start: 2019-10-28

## 2019-10-28 RX ORDER — ESCITALOPRAM OXALATE 10 MG/1
1 TABLET, FILM COATED ORAL
Qty: 0 | Refills: 0 | DISCHARGE
Start: 2019-10-28

## 2019-10-28 RX ORDER — DOCUSATE SODIUM 100 MG
1 CAPSULE ORAL
Qty: 0 | Refills: 0 | DISCHARGE
Start: 2019-10-28

## 2019-10-28 RX ORDER — SIMVASTATIN 20 MG/1
0 TABLET, FILM COATED ORAL
Qty: 90 | Refills: 0 | DISCHARGE

## 2019-10-28 RX ADMIN — Medication 975 MILLIGRAM(S): at 21:50

## 2019-10-28 RX ADMIN — Medication 975 MILLIGRAM(S): at 05:47

## 2019-10-28 RX ADMIN — Medication 975 MILLIGRAM(S): at 12:44

## 2019-10-28 RX ADMIN — DONEPEZIL HYDROCHLORIDE 10 MILLIGRAM(S): 10 TABLET, FILM COATED ORAL at 21:50

## 2019-10-28 RX ADMIN — ESCITALOPRAM OXALATE 20 MILLIGRAM(S): 10 TABLET, FILM COATED ORAL at 12:43

## 2019-10-28 RX ADMIN — Medication 975 MILLIGRAM(S): at 05:43

## 2019-10-28 RX ADMIN — CARBIDOPA AND LEVODOPA 1 TABLET(S): 25; 100 TABLET ORAL at 05:42

## 2019-10-28 RX ADMIN — ENOXAPARIN SODIUM 40 MILLIGRAM(S): 100 INJECTION SUBCUTANEOUS at 12:43

## 2019-10-28 RX ADMIN — SENNA PLUS 2 TABLET(S): 8.6 TABLET ORAL at 21:50

## 2019-10-28 RX ADMIN — CARBIDOPA AND LEVODOPA 1 TABLET(S): 25; 100 TABLET ORAL at 23:22

## 2019-10-28 RX ADMIN — PANTOPRAZOLE SODIUM 40 MILLIGRAM(S): 20 TABLET, DELAYED RELEASE ORAL at 05:42

## 2019-10-28 RX ADMIN — Medication 100 MILLIGRAM(S): at 05:42

## 2019-10-28 RX ADMIN — Medication 88 MICROGRAM(S): at 05:42

## 2019-10-28 RX ADMIN — CARBIDOPA AND LEVODOPA 1 TABLET(S): 25; 100 TABLET ORAL at 21:50

## 2019-10-28 RX ADMIN — CARBIDOPA AND LEVODOPA 1 TABLET(S): 25; 100 TABLET ORAL at 17:06

## 2019-10-28 RX ADMIN — Medication 5 MILLIGRAM(S): at 21:50

## 2019-10-28 RX ADMIN — Medication 975 MILLIGRAM(S): at 21:51

## 2019-10-28 RX ADMIN — CARBIDOPA AND LEVODOPA 1 TABLET(S): 25; 100 TABLET ORAL at 12:44

## 2019-10-28 RX ADMIN — Medication 100 MILLIGRAM(S): at 17:06

## 2019-10-28 RX ADMIN — SIMVASTATIN 20 MILLIGRAM(S): 20 TABLET, FILM COATED ORAL at 21:49

## 2019-10-28 NOTE — DISCHARGE NOTE PROVIDER - CARE PROVIDER_API CALL
Pranay Rodriguez)  Surgery  [address] [place] [address]  Phone: [phone]  Fax: [phone]  Follow Up Time:     Pritesh Lopez)  Neuromuscular Medicine  [address] [place] [address], [address]  [place], [place] [id number]  Phone: [phone]  Fax: [phone]  Follow Up Time:

## 2019-10-28 NOTE — DISCHARGE NOTE PROVIDER - NSDCFUSCHEDAPPT_GEN_ALL_CORE_FT
RODGER REYNA ; 12/12/2019 ; [facility] [address] RODGER REYNA ; 12/12/2019 ; [facility] [address] RODGER REYNA ; 12/12/2019 ; [facility] [address] RODGER REYNA ; 12/12/2019 ; [facility] [address] RODGER REYNA ; 12/12/2019 ; [facility] [address] RODGER REYNA ; 12/12/2019 ; [facility] [address] RODGER REYNA ; 12/12/2019 ; [facility] [address]

## 2019-10-28 NOTE — DISCHARGE NOTE PROVIDER - HOSPITAL COURSE
Hospital Course		    79 year old female with PMHx Parkinsons, Hypothyroid, presents to ED s/p fall. As per family, they heard the pt fall around 1130pm, pt was found down lying onto her left side, . Pt does not recall how she fell. Pt presents w/ Left shoulder pain. Otherwise no HA, no CP, no SOB, no abdominal pain, no dysuria.   her fall  fall possibly due to syncope.    She was seen by the neurology team and EEG and MRI showed no obvious cause of syncope. Neuro team recommend no further intervention.         An MRI cervical spine showed c6-c7 spinal stenosis. She was seen neurosurgery  who recommends no acute intervention and f/u  as an outpatient.        As a result of the fall she had an acute displaced  midshaft fracture of the left clavicle which was splinted with No further intervention per trauma. She also had Left sided rib fracture.      she was transferred to rehab on 10/14/19 . she continued 3 hours of acute rehab per day . she is non weight bearing  to left arm, need  to  keep in sling .  she still needs more rehab  in short term rehab .  she needs to follow up with ortho in 2 weeks , keeps left arm in sling  until then ,  Her pain is controlled with tylenol . patient cannot   tolerate narcotics Hospital Course		    79 year old female with PMHx Parkinsons, Hypothyroid, presents to ED s/p fall. As per family, they heard the pt fall around 1130pm, pt was found down lying onto her left side, . Pt does not recall how she fell. Pt presents w/ Left shoulder pain. Otherwise no HA, no CP, no SOB, no abdominal pain, no dysuria.   her fall  fall possibly due to syncope.    She was seen by the neurology team and EEG and MRI showed no obvious cause of syncope. Neuro team recommend no further intervention.         An MRI cervical spine showed c6-c7 spinal stenosis. She was seen neurosurgery  who recommends no acute intervention and f/u  as an outpatient.        As a result of the fall she had an acute displaced  midshaft fracture of the left clavicle which was splinted with No further intervention per trauma. She also had Left sided rib fracture.  she was transferred to rehab on 10/14/19 . she continued 3 hours of acute rehab per day . she is non weight bearing  to left and  arm  to be  in sling .  she still needs more rehab.  she needs to follow up with ortho dr bonds  and neuro dr Lopez  in 2 weeks. ,   Her pain is controlled with tylenol . patient cannot   tolerate narcotics . in rehab she can ambulate 75 feet with hand held assistance , but for bed mobility and other ADLs she need maximum assistance . Hospital Course		    79 year old female with PMHx Parkinsons, Hypothyroid, presented to ED on 10/7/19 s/p fall. As per family, they heard the pt fall around 1130pm, pt was found down lying onto her left side, . Pt does not recall how she fell. Pt presented w/ Left shoulder pain. Otherwise no HA, no CP, no SOB, no abdominal pain, no dysuria.   Her fall was possibly due to syncope.    She was seen by the neurology team and EEG and MRI showed no obvious cause of syncope. Neuro team recommend no further intervention. However, her Sinemet dose was increased, Neuro felt the falls are due to worsening Parkinson's.        An MRI cervical spine showed c6-c7 spinal stenosis. She was seen neurosurgery  who recommends no acute intervention and f/u  as an outpatient.        As a result of the fall she had an acute displaced  midshaft fracture of the left clavicle which was splinted with no further intervention per trauma. She also has Left sided rib fractures. She was transferred to rehab on 10/14/19, where she continued 3 hours of acute rehab per day . She is non weight bearing  to left and to wear sling for comfort.         She needs to follow up with ortho dr bonds  and neuro dr Lopez  in 2 weeks.  Her pain is controlled with tylenol , she cannot tolerate narcotics . She can ambulate 75 feet with hand held assistance , but for bed mobility and other ADLs she needs maximum assistance.         Labs were stable on 10/28/19, TFT's and vitamin D are normal. She is being discharged to [facility] facility on 10/29/19 in order to continue PT/OT. Hospital Course		    79 year old female with PMHx Parkinsons, Hypothyroid, presented to ED on 10/7/19 s/p fall. As per family, they heard the pt fall around 1130pm, pt was found down lying onto her left side, . Pt does not recall how she fell. Pt presented w/ Left shoulder pain. Otherwise no HA, no CP, no SOB, no abdominal pain, no dysuria.   Her fall was possibly due to syncope.    She was seen by the neurology team and EEG and MRI showed no obvious cause of syncope. Neuro team recommend no further intervention. However, her Sinemet dose was increased, Neuro felt the falls are due to worsening Parkinson's.        An MRI cervical spine showed c6-c7 spinal stenosis. She was seen neurosurgery  who recommends no acute intervention and f/u  as an outpatient.        As a result of the fall she had an acute displaced  midshaft fracture of the left clavicle which was splinted with no further intervention per trauma. She also has Left sided rib fractures. She was transferred to rehab on 10/14/19, where she continued 3 hours of acute rehab per day . She is non weight bearing  to left and to wear sling for comfort.         She needs to follow up with ortho dr bonds  and neuro dr Lopez  in 2 weeks.  Her pain is controlled with tylenol , she cannot tolerate narcotics . She can ambulate 75 feet with hand held assistance , but for bed mobility and other ADLs she needs maximum assistance.         Labs were stable on 10/28/19, TFT's and vitamin D are normal. She is being discharged to [facility] facility on 10/29/19 in order to continue PT/OT.         FLU VACCINE WAS GIVEN ON 10/12/19. Hospital Course		    79 year old female with PMHx Parkinsons, Hypothyroid, presented to ED on 10/7/19 s/p fall. As per family, they heard the pt fall around 1130pm, pt was found down lying onto her left side, . Pt does not recall how she fell. Pt presented w/ Left shoulder pain. Otherwise no HA, no CP, no SOB, no abdominal pain, no dysuria.   Her fall was possibly due to syncope.    She was seen by the neurology team and EEG and MRI showed no obvious cause of syncope. Neuro team recommend no further intervention. However, her Sinemet dose was increased, Neuro felt the falls are due to worsening Parkinson's.        An MRI cervical spine showed c6-c7 spinal stenosis. She was seen neurosurgery  who recommends no acute intervention and f/u  as an outpatient.    CT chest also revealed:    Acute L 3,4,5,6,7 rib fx    - Ascending thoracic aorta aneurysm 4.1cm          As a result of the fall she had an acute displaced  midshaft fracture of the left clavicle which was splinted with no further intervention per trauma. She also has Left sided rib fractures. She was transferred to rehab on 10/14/19, where she continued 3 hours of acute rehab per day . She is non weight bearing  to left and to wear sling for comfort.         She needs to follow up with Ortho Dr. Rodriguez  and Neuro Dr. Lopez  in 2 weeks. She will also need to follow up with her PCP and have serial Chest CT's to monitor the thoracic    aortic aneurysm. Her pain is controlled with tylenol , she cannot tolerate narcotics . She can ambulate 75 feet with hand held assistance , but for bed mobility and other ADLs she needs maximum assistance.         Labs were stable on 10/28/19, TFT's and vitamin D are normal. She is being [facility] to [facility] on 10/[facility]/19 in order to continue PT/OT.         FLU VACCINE WAS GIVEN ON 10/12/19.

## 2019-10-28 NOTE — DISCHARGE NOTE PROVIDER - NSDCCPCAREPLAN_GEN_ALL_CORE_FT
PRINCIPAL DISCHARGE DIAGNOSIS  Diagnosis: Parkinson's disease dementia  Assessment and Plan of Treatment: continue sinemet at 5 times as ordered by your neurologist , prevent falls , safety precautions      SECONDARY DISCHARGE DIAGNOSES  Diagnosis: Clavicle fracture  Assessment and Plan of Treatment: keep left arm in sling nonweight bearing to arm , pain control by tylenol , ortho follow up in 2 weeks , brings to medical attention if increased pain or swelling PRINCIPAL DISCHARGE DIAGNOSIS  Diagnosis: Parkinson's disease dementia  Assessment and Plan of Treatment: continue sinemet as ordered by your neurologist , prevent falls , safety precautions; continue therapy (PT and OT) at the [facility] facility. Follow up regularly with your neurologist.      SECONDARY DISCHARGE DIAGNOSES  Diagnosis: Clavicle fracture  Assessment and Plan of Treatment: keep left arm in sling nonweight bearing to arm , pain control by tylenol , ortho follow up in 2 weeks , brings to medical attention if increased pain or swelling. You also have multiple left rib fractures, which will heal on their own with time. It is important to keep taking deep breaths and coughing, to get air all the way into your lungs, and to prevent congestion in your lungs. Call 911 or go to the ER right away if you develop shortness of breath or chest pain.    Diagnosis: Thoracic aortic aneurysm  Assessment and Plan of Treatment: It is important that you follow up with your primary care provider (PCP) and with the cardiologist to make sure the aneurysm isn't getting bigger. You have an appointment with the cardiologist on Dec 12, 2019.    Diagnosis: Hypothyroid  Assessment and Plan of Treatment: Continue taking synthroid every morning, one hour before breakfast and other medications. Your thyroid function tests were normal, they were checked during this admission. Follow up with your PCP so your thyroid tests can be monitored periodically to make sure your synthroid dose remains correct.

## 2019-10-28 NOTE — DISCHARGE NOTE PROVIDER - NSDCFUADDINST_GEN_ALL_CORE_FT
***FLU VACCINE WAS GIVEN ON 10/12/19***      ***PLEASE SHOW THESE DISCHARGE PAPERS TO ALL YOUR DOCTORS AND CAREGIVERS***      ***FOLLOW UP WITH YOUR PRIMARY CARE PROVIDER (PCP) WHEN YOU ARE DISCHARGED BACK HOME***

## 2019-10-28 NOTE — DISCHARGE NOTE PROVIDER - CARE PROVIDERS DIRECT ADDRESSES
[email or web address]

## 2019-10-29 RX ADMIN — Medication 975 MILLIGRAM(S): at 06:15

## 2019-10-29 RX ADMIN — Medication 975 MILLIGRAM(S): at 07:28

## 2019-10-29 RX ADMIN — CARBIDOPA AND LEVODOPA 1 TABLET(S): 25; 100 TABLET ORAL at 11:38

## 2019-10-29 RX ADMIN — Medication 100 MILLIGRAM(S): at 06:16

## 2019-10-29 RX ADMIN — Medication 975 MILLIGRAM(S): at 11:40

## 2019-10-29 RX ADMIN — CARBIDOPA AND LEVODOPA 1 TABLET(S): 25; 100 TABLET ORAL at 06:16

## 2019-10-29 RX ADMIN — Medication 975 MILLIGRAM(S): at 11:38

## 2019-10-29 RX ADMIN — Medication 650 MILLIGRAM(S): at 08:18

## 2019-10-29 RX ADMIN — Medication 88 MICROGRAM(S): at 06:16

## 2019-10-29 RX ADMIN — ESCITALOPRAM OXALATE 20 MILLIGRAM(S): 10 TABLET, FILM COATED ORAL at 11:40

## 2019-10-29 RX ADMIN — PANTOPRAZOLE SODIUM 40 MILLIGRAM(S): 20 TABLET, DELAYED RELEASE ORAL at 06:16

## 2019-10-30 ENCOUNTER — OUTPATIENT (OUTPATIENT)
Dept: OUTPATIENT SERVICES | Facility: HOSPITAL | Age: 79
LOS: 1 days | Discharge: HOME | End: 2019-10-30

## 2019-10-30 DIAGNOSIS — E78.5 HYPERLIPIDEMIA, UNSPECIFIED: ICD-10-CM

## 2019-11-01 ENCOUNTER — OUTPATIENT (OUTPATIENT)
Dept: OUTPATIENT SERVICES | Facility: HOSPITAL | Age: 79
LOS: 1 days | Discharge: HOME | End: 2019-11-01

## 2019-11-01 DIAGNOSIS — G20 PARKINSON'S DISEASE: ICD-10-CM

## 2019-11-05 DIAGNOSIS — S42.022D DISPLACED FRACTURE OF SHAFT OF LEFT CLAVICLE, SUBSEQUENT ENCOUNTER FOR FRACTURE WITH ROUTINE HEALING: ICD-10-CM

## 2019-11-05 DIAGNOSIS — E78.5 HYPERLIPIDEMIA, UNSPECIFIED: ICD-10-CM
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
## 2019-11-05 DIAGNOSIS — Z79.52 LONG TERM (CURRENT) USE OF SYSTEMIC STEROIDS: ICD-10-CM

## 2019-11-05 DIAGNOSIS — M85.80 OTHER SPECIFIED DISORDERS OF BONE DENSITY AND STRUCTURE, UNSPECIFIED SITE: ICD-10-CM

## 2019-11-05 DIAGNOSIS — F02.80 DEMENTIA IN OTHER DISEASES CLASSIFIED ELSEWHERE, UNSPECIFIED SEVERITY, WITHOUT BEHAVIORAL DISTURBANCE, PSYCHOTIC DISTURBANCE, MOOD DISTURBANCE, AND ANXIETY: ICD-10-CM

## 2019-11-05 DIAGNOSIS — G47.00 INSOMNIA, UNSPECIFIED: ICD-10-CM

## 2019-11-05 DIAGNOSIS — G20 PARKINSON'S DISEASE: ICD-10-CM
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
## 2019-11-05 DIAGNOSIS — K21.9 GASTRO-ESOPHAGEAL REFLUX DISEASE WITHOUT ESOPHAGITIS: ICD-10-CM

## 2019-11-05 DIAGNOSIS — S22.42XD MULTIPLE FRACTURES OF RIBS, LEFT SIDE, SUBSEQUENT ENCOUNTER FOR FRACTURE WITH ROUTINE HEALING: ICD-10-CM

## 2019-11-05 DIAGNOSIS — R27.0 ATAXIA, UNSPECIFIED: ICD-10-CM

## 2019-11-05 DIAGNOSIS — D64.9 ANEMIA, UNSPECIFIED: ICD-10-CM

## 2019-11-05 DIAGNOSIS — F32.9 MAJOR DEPRESSIVE DISORDER, SINGLE EPISODE, UNSPECIFIED: ICD-10-CM

## 2019-11-05 DIAGNOSIS — E03.9 HYPOTHYROIDISM, UNSPECIFIED: ICD-10-CM

## 2019-11-05 DIAGNOSIS — M48.02 SPINAL STENOSIS, CERVICAL REGION: ICD-10-CM

## 2019-11-05 DIAGNOSIS — I71.2 THORACIC AORTIC ANEURYSM, WITHOUT RUPTURE: ICD-10-CM

## 2019-11-05 DIAGNOSIS — K59.00 CONSTIPATION, UNSPECIFIED: ICD-10-CM

## 2019-11-21 ENCOUNTER — APPOINTMENT (OUTPATIENT)
Dept: CARDIOLOGY | Facility: CLINIC | Age: 79
End: 2019-11-21

## 2019-11-25 ENCOUNTER — APPOINTMENT (OUTPATIENT)
Dept: NEUROLOGY | Facility: CLINIC | Age: 79
End: 2019-11-25
Payer: MEDICARE

## 2019-11-25 VITALS
BODY MASS INDEX: 20.55 KG/M2 | HEIGHT: 63 IN | SYSTOLIC BLOOD PRESSURE: 122 MMHG | HEART RATE: 55 BPM | DIASTOLIC BLOOD PRESSURE: 60 MMHG | WEIGHT: 116 LBS | OXYGEN SATURATION: 99 % | TEMPERATURE: 92.3 F

## 2019-11-25 PROCEDURE — 99214 OFFICE O/P EST MOD 30 MIN: CPT

## 2019-11-25 NOTE — ASSESSMENT
[FreeTextEntry1] : Janine is here for followup. She was recently admitted to the hospital after a fall which as I believe it is multifactorial combination of her Parkinson and also spine disease and to lesser extent her attitude of doing more than what she is supposed to do well or doing it in rush.\par At this point we will continue her current doses she is going to have her rehabilitation in the house and I will consider asking her to take her morning dose earlier before getting out of the bed. I will see her in followup and treatment as

## 2019-11-25 NOTE — HISTORY OF PRESENT ILLNESS
[FreeTextEntry1] : Janine is here for followup. She was admitted to the [facility] in October 2019 after she had a fall. It happened around 11:30 PM of that date. And apparently she did have also trauma to the left shoulder and left arm fracture. She was in the hospital for a period of that almost 3 weeks to include the inpatient rehabilitation as well. She did have her MRI of the lower back done that showed disease and also cervical MRI that showed stenosis. She was quite happy with there he had she progressed.\par Now she is here for followup. Her Sinemet regular was increased to 4 times a day.\par C. she does have a slight difficulty. Her morning. She does have a slight difficulty starting her date but for the rest of the day she is doing okay. At this point the daughter who takes care of her he is trying to give her last dose of the regular Sinemet as late as possible to make up for the morning peak she denies having any shoulder pain.

## 2019-11-25 NOTE — PHYSICAL EXAM
[FreeTextEntry1] : She is awake and alert. Follows 3-4 step commands. On my questioning she says her mood is okay but is likely that. I do not see a significant change she continues to have her flat affect for the most part and at the same time able to show a lot of humor and different ranges of emotions.\par She does not have tremor she appears to be a slightly rigid or posture is a slightly scoliotic to the left and she does have slightly more stiffening on the right side and decreased swinging on the right side. She does have mild cogwheeling.\par Her gait is a stable hesitant and mildly shuffling her Romberg is negative

## 2019-12-12 ENCOUNTER — APPOINTMENT (OUTPATIENT)
Dept: CARDIOLOGY | Facility: CLINIC | Age: 79
End: 2019-12-12
Payer: MEDICARE

## 2019-12-12 VITALS
WEIGHT: 112 LBS | HEIGHT: 63 IN | BODY MASS INDEX: 19.84 KG/M2 | SYSTOLIC BLOOD PRESSURE: 117 MMHG | DIASTOLIC BLOOD PRESSURE: 60 MMHG

## 2019-12-12 DIAGNOSIS — E78.5 HYPERLIPIDEMIA, UNSPECIFIED: ICD-10-CM

## 2019-12-12 PROCEDURE — 99213 OFFICE O/P EST LOW 20 MIN: CPT

## 2019-12-12 NOTE — PHYSICAL EXAM
[General Appearance - Well Developed] : well developed [Normal Appearance] : normal appearance [Well Groomed] : well groomed [General Appearance - Well Nourished] : well nourished [No Deformities] : no deformities [General Appearance - In No Acute Distress] : no acute distress [Heart Rate And Rhythm] : heart rate and rhythm were normal [Murmurs] : no murmurs present [Heart Sounds] : normal S1 and S2

## 2019-12-12 NOTE — PHYSICAL EXAM
[General Appearance - Well Developed] : well developed [Well Groomed] : well groomed [Normal Appearance] : normal appearance [General Appearance - Well Nourished] : well nourished [No Deformities] : no deformities [General Appearance - In No Acute Distress] : no acute distress [Heart Rate And Rhythm] : heart rate and rhythm were normal [Heart Sounds] : normal S1 and S2 [Murmurs] : no murmurs present

## 2019-12-12 NOTE — REASON FOR VISIT
Hyperlipidemia] : hyperlipidemia [Follow-Up - Clinic] : a clinic follow-up of [Hypertension] : hypertension

## 2019-12-12 NOTE — HISTORY OF PRESENT ILLNESS
[FreeTextEntry1] : pt was in [facility] for fall and fracture ribs and clavicle\par  pt was in rehab\par  pt is feeling betyter\par  pt is seen by neurology\par  [facility] chartb reviewed pt had 4.1 cm ascendinf aoric aneurysm\par  meds reviewed

## 2019-12-12 NOTE — REASON FOR VISIT
[Hyperlipidemia] : hyperlipidemia [Hypertension] : hypertension [Follow-Up - Clinic] : a clinic follow-up of

## 2019-12-12 NOTE — REASON FOR VISIT
[Hypertension] : hypertension [Follow-Up - Clinic] : a clinic follow-up of [Hyperlipidemia] : hyperlipidemia

## 2019-12-26 ENCOUNTER — RX RENEWAL (OUTPATIENT)
Age: 79
End: 2019-12-26

## 2020-01-09 ENCOUNTER — RX RENEWAL (OUTPATIENT)
Age: 80
End: 2020-01-09

## 2020-03-05 ENCOUNTER — APPOINTMENT (OUTPATIENT)
Dept: NEUROLOGY | Facility: CLINIC | Age: 80
End: 2020-03-05
Payer: MEDICARE

## 2020-03-05 VITALS
OXYGEN SATURATION: 99 % | HEIGHT: 63 IN | BODY MASS INDEX: 19.14 KG/M2 | HEART RATE: 69 BPM | TEMPERATURE: 98.5 F | SYSTOLIC BLOOD PRESSURE: 117 MMHG | WEIGHT: 108 LBS | DIASTOLIC BLOOD PRESSURE: 69 MMHG

## 2020-03-05 PROCEDURE — 99213 OFFICE O/P EST LOW 20 MIN: CPT

## 2020-03-05 RX ORDER — DONEPEZIL HYDROCHLORIDE 10 MG/1
10 TABLET, ORALLY DISINTEGRATING ORAL
Refills: 0 | Status: DISCONTINUED | COMMUNITY
End: 2020-03-05

## 2020-03-05 RX ORDER — SIMVASTATIN 20 MG/1
20 TABLET, FILM COATED ORAL
Qty: 90 | Refills: 1 | Status: DISCONTINUED | COMMUNITY
Start: 2019-03-12 | End: 2020-03-05

## 2020-03-06 NOTE — ASSESSMENT
[FreeTextEntry1] : Patient is a 80 year old woman with a history of Parkinsons Disease, depression and spine disease, s/p left arm fracture who presents for a follow up visit for Parkinsons.  Overall, Coretta is doing really well with regards to her Parkinsons.  She would benefit form physical therapy for muscle strength.    \par \par Plan:\par 1. Physical therapy\par 2. Return in 4 months

## 2020-03-06 NOTE — PHYSICAL EXAM
[FreeTextEntry1] : Neurologic Examination:\par NAD. AOx3.  Intact memory.  Speech fluent, nondysarthric.  CN 2 – 12 normal. FLAT AFFECT \par Strength 5/5 b/l UE/LE.  NL tone, bulk. MILD COGWHEELING LUE, INCREASED TONE RUE, DECREASED NECK ROM TO THE LEFT>RIGHT  No abnl movements.  DTRs 2+ throughout.  Plantar response downgoing b/l.  (-) Hoffmans, clonus.  Sensory intact LT/PP, pain, temp, proprioception and vibration.  NL FTN/HKS.  No dysdiadokinesia.  ambulates with walker and her gait is stable.  Gait narrow based/NL tandem.  \par \par

## 2020-03-06 NOTE — HISTORY OF PRESENT ILLNESS
[FreeTextEntry1] : Patient is a 80 year old woman with a history of Parkinsons Disease, memory decline. depression and spine disease, s/p left arm fracture who presents for a follow up visit for Parkinsons and memory decline.  She is accompanied to the office by her daughter.  Patient is stable since her last visit.  She is taking carbidopa-levodopa  mg 1 tab 4 times daily, carbidopa-levodopa ER  mg 1 tablet at bedtime, and donepezil 10 mg tablet.  Patient denies falls.  However, her mood has not been good.  She has not been getting together with friends and has not been social.  She spends a lot of time in the house but she lives with her daughter.  She has not been reading or involved in the activities she once enjoyed.  She is not as active as she once was.

## 2020-05-04 RX ORDER — ESCITALOPRAM OXALATE 20 MG/1
20 TABLET ORAL DAILY
Qty: 30 | Refills: 0 | Status: ACTIVE | COMMUNITY
Start: 1900-01-01 | End: 1900-01-01

## 2020-06-06 ENCOUNTER — EMERGENCY (EMERGENCY)
Facility: HOSPITAL | Age: 80
LOS: 0 days | Discharge: HOME | End: 2020-06-06
Attending: EMERGENCY MEDICINE | Admitting: EMERGENCY MEDICINE
Payer: MEDICARE

## 2020-06-06 VITALS
DIASTOLIC BLOOD PRESSURE: 69 MMHG | TEMPERATURE: 97 F | RESPIRATION RATE: 20 BRPM | OXYGEN SATURATION: 97 % | SYSTOLIC BLOOD PRESSURE: 161 MMHG | HEART RATE: 83 BPM

## 2020-06-06 VITALS
HEART RATE: 76 BPM | TEMPERATURE: 98 F | DIASTOLIC BLOOD PRESSURE: 65 MMHG | OXYGEN SATURATION: 97 % | RESPIRATION RATE: 18 BRPM | SYSTOLIC BLOOD PRESSURE: 144 MMHG

## 2020-06-06 DIAGNOSIS — G20 PARKINSON'S DISEASE: ICD-10-CM

## 2020-06-06 DIAGNOSIS — Z79.899 OTHER LONG TERM (CURRENT) DRUG THERAPY: ICD-10-CM

## 2020-06-06 DIAGNOSIS — R10.9 UNSPECIFIED ABDOMINAL PAIN: ICD-10-CM

## 2020-06-06 DIAGNOSIS — E03.9 HYPOTHYROIDISM, UNSPECIFIED: ICD-10-CM

## 2020-06-06 DIAGNOSIS — N39.0 URINARY TRACT INFECTION, SITE NOT SPECIFIED: ICD-10-CM

## 2020-06-06 DIAGNOSIS — Z79.2 LONG TERM (CURRENT) USE OF ANTIBIOTICS: ICD-10-CM

## 2020-06-06 LAB
ALBUMIN SERPL ELPH-MCNC: 4.5 G/DL — SIGNIFICANT CHANGE UP (ref 3.5–5.2)
ALP SERPL-CCNC: 64 U/L — SIGNIFICANT CHANGE UP (ref 30–115)
ALT FLD-CCNC: 6 U/L — SIGNIFICANT CHANGE UP (ref 0–41)
ANION GAP SERPL CALC-SCNC: 11 MMOL/L — SIGNIFICANT CHANGE UP (ref 7–14)
APPEARANCE UR: ABNORMAL
AST SERPL-CCNC: 17 U/L — SIGNIFICANT CHANGE UP (ref 0–41)
BACTERIA # UR AUTO: ABNORMAL
BASOPHILS # BLD AUTO: 0.04 K/UL — SIGNIFICANT CHANGE UP (ref 0–0.2)
BASOPHILS NFR BLD AUTO: 0.3 % — SIGNIFICANT CHANGE UP (ref 0–1)
BILIRUB SERPL-MCNC: 0.7 MG/DL — SIGNIFICANT CHANGE UP (ref 0.2–1.2)
BILIRUB UR-MCNC: NEGATIVE — SIGNIFICANT CHANGE UP
BUN SERPL-MCNC: 12 MG/DL — SIGNIFICANT CHANGE UP (ref 10–20)
CALCIUM SERPL-MCNC: 9.4 MG/DL — SIGNIFICANT CHANGE UP (ref 8.5–10.1)
CHLORIDE SERPL-SCNC: 102 MMOL/L — SIGNIFICANT CHANGE UP (ref 98–110)
CO2 SERPL-SCNC: 27 MMOL/L — SIGNIFICANT CHANGE UP (ref 17–32)
COLOR SPEC: YELLOW — SIGNIFICANT CHANGE UP
CREAT SERPL-MCNC: 0.8 MG/DL — SIGNIFICANT CHANGE UP (ref 0.7–1.5)
DIFF PNL FLD: ABNORMAL
EOSINOPHIL # BLD AUTO: 0.02 K/UL — SIGNIFICANT CHANGE UP (ref 0–0.7)
EOSINOPHIL NFR BLD AUTO: 0.2 % — SIGNIFICANT CHANGE UP (ref 0–8)
EPI CELLS # UR: 1 /HPF — SIGNIFICANT CHANGE UP (ref 0–5)
GLUCOSE SERPL-MCNC: 119 MG/DL — HIGH (ref 70–99)
GLUCOSE UR QL: NEGATIVE — SIGNIFICANT CHANGE UP
HCT VFR BLD CALC: 42 % — SIGNIFICANT CHANGE UP (ref 37–47)
HGB BLD-MCNC: 14 G/DL — SIGNIFICANT CHANGE UP (ref 12–16)
HYALINE CASTS # UR AUTO: 9 /LPF — HIGH (ref 0–7)
IMM GRANULOCYTES NFR BLD AUTO: 0.4 % — HIGH (ref 0.1–0.3)
KETONES UR-MCNC: ABNORMAL
LACTATE SERPL-SCNC: 1.6 MMOL/L — SIGNIFICANT CHANGE UP (ref 0.7–2)
LEUKOCYTE ESTERASE UR-ACNC: ABNORMAL
LYMPHOCYTES # BLD AUTO: 0.93 K/UL — LOW (ref 1.2–3.4)
LYMPHOCYTES # BLD AUTO: 7.4 % — LOW (ref 20.5–51.1)
MCHC RBC-ENTMCNC: 31.8 PG — HIGH (ref 27–31)
MCHC RBC-ENTMCNC: 33.3 G/DL — SIGNIFICANT CHANGE UP (ref 32–37)
MCV RBC AUTO: 95.5 FL — SIGNIFICANT CHANGE UP (ref 81–99)
MONOCYTES # BLD AUTO: 0.95 K/UL — HIGH (ref 0.1–0.6)
MONOCYTES NFR BLD AUTO: 7.6 % — SIGNIFICANT CHANGE UP (ref 1.7–9.3)
NEUTROPHILS # BLD AUTO: 10.59 K/UL — HIGH (ref 1.4–6.5)
NEUTROPHILS NFR BLD AUTO: 84.1 % — HIGH (ref 42.2–75.2)
NITRITE UR-MCNC: POSITIVE
NRBC # BLD: 0 /100 WBCS — SIGNIFICANT CHANGE UP (ref 0–0)
PH UR: 6.5 — SIGNIFICANT CHANGE UP (ref 5–8)
PLATELET # BLD AUTO: 183 K/UL — SIGNIFICANT CHANGE UP (ref 130–400)
POTASSIUM SERPL-MCNC: 4.7 MMOL/L — SIGNIFICANT CHANGE UP (ref 3.5–5)
POTASSIUM SERPL-SCNC: 4.7 MMOL/L — SIGNIFICANT CHANGE UP (ref 3.5–5)
PROT SERPL-MCNC: 7 G/DL — SIGNIFICANT CHANGE UP (ref 6–8)
PROT UR-MCNC: ABNORMAL
RBC # BLD: 4.4 M/UL — SIGNIFICANT CHANGE UP (ref 4.2–5.4)
RBC # FLD: 12.8 % — SIGNIFICANT CHANGE UP (ref 11.5–14.5)
RBC CASTS # UR COMP ASSIST: 323 /HPF — HIGH (ref 0–4)
SODIUM SERPL-SCNC: 140 MMOL/L — SIGNIFICANT CHANGE UP (ref 135–146)
SP GR SPEC: 1.02 — SIGNIFICANT CHANGE UP (ref 1.01–1.02)
UROBILINOGEN FLD QL: SIGNIFICANT CHANGE UP
WBC # BLD: 12.58 K/UL — HIGH (ref 4.8–10.8)
WBC # FLD AUTO: 12.58 K/UL — HIGH (ref 4.8–10.8)
WBC UR QL: >720 /HPF — HIGH (ref 0–5)

## 2020-06-06 PROCEDURE — 99285 EMERGENCY DEPT VISIT HI MDM: CPT

## 2020-06-06 PROCEDURE — 74177 CT ABD & PELVIS W/CONTRAST: CPT | Mod: 26

## 2020-06-06 RX ORDER — CEFPODOXIME PROXETIL 100 MG
1 TABLET ORAL
Qty: 20 | Refills: 0
Start: 2020-06-06 | End: 2020-06-15

## 2020-06-06 RX ORDER — CEFTRIAXONE 500 MG/1
1000 INJECTION, POWDER, FOR SOLUTION INTRAMUSCULAR; INTRAVENOUS ONCE
Refills: 0 | Status: COMPLETED | OUTPATIENT
Start: 2020-06-06 | End: 2020-06-06

## 2020-06-06 RX ORDER — ONDANSETRON 8 MG/1
4 TABLET, FILM COATED ORAL ONCE
Refills: 0 | Status: COMPLETED | OUTPATIENT
Start: 2020-06-06 | End: 2020-06-06

## 2020-06-06 RX ORDER — SODIUM CHLORIDE 9 MG/ML
1000 INJECTION INTRAMUSCULAR; INTRAVENOUS; SUBCUTANEOUS ONCE
Refills: 0 | Status: COMPLETED | OUTPATIENT
Start: 2020-06-06 | End: 2020-06-06

## 2020-06-06 RX ADMIN — SODIUM CHLORIDE 1000 MILLILITER(S): 9 INJECTION INTRAMUSCULAR; INTRAVENOUS; SUBCUTANEOUS at 13:17

## 2020-06-06 RX ADMIN — SODIUM CHLORIDE 1000 MILLILITER(S): 9 INJECTION INTRAMUSCULAR; INTRAVENOUS; SUBCUTANEOUS at 11:20

## 2020-06-06 RX ADMIN — CEFTRIAXONE 100 MILLIGRAM(S): 500 INJECTION, POWDER, FOR SOLUTION INTRAMUSCULAR; INTRAVENOUS at 15:02

## 2020-06-06 NOTE — ED PROVIDER NOTE - OBJECTIVE STATEMENT
80 year old F with hx of hypothyroidism, parkinson's c/o urinary frequency x 1 day. + dysuria, suprapubic discomfort and nausea. No fever/chills, vomiting, past surgical hx, hematuria, weakness, chest pain, sob.

## 2020-06-06 NOTE — ED ADULT NURSE NOTE - INTERVENTIONS DEFINITIONS
Dillon to call system/Instruct patient to call for assistance/Reinforce activity limits and safety measures with patient and family

## 2020-06-06 NOTE — ED PROVIDER NOTE - PATIENT PORTAL LINK FT
You can access the FollowMyHealth Patient Portal offered by [facility] by registering at the following website: [email or web address]. By joining [facility]’s FollowMyHealth portal, you will also be able to view your health information using other applications (apps) compatible with our system.

## 2020-06-06 NOTE — ED PROVIDER NOTE - NSFOLLOWUPINSTRUCTIONS_ED_ALL_ED_FT
Urinary Tract Infection    A urinary tract infection (UTI) is an infection of any part of the urinary tract, which includes the kidneys, ureters, bladder, and urethra. Risk factors include ignoring your need to urinate, wiping back to front if female, being an uncircumcised male, and having diabetes or a weak immune system. Symptoms include frequent urination, pain or burning with urination, foul smelling urine, cloudy urine, pain in the lower abdomen, blood in the urine, and fever. If you were prescribed an antibiotic medicine, take it as told by your health care provider. Do not stop taking the antibiotic even if you start to feel better.    SEEK IMMEDIATE MEDICAL CARE IF YOU HAVE ANY OF THE FOLLOWING SYMPTOMS: severe back or abdominal pain, fever, inability to keep fluids or medicine down, dizziness/lightheadedness, or a change in mental status.    Please follow up with your primary care doctor within one week.

## 2020-06-06 NOTE — ED PROVIDER NOTE - CLINICAL SUMMARY MEDICAL DECISION MAKING FREE TEXT BOX
Patient presented with dysuria, suprapubic abdominal pain, (+) left CVA tenderness on exam but otherwise afebrile, HD stable, well appearing, abd non-tender. Obtained labs which were grossly unremarkable including no significant leukocytosis, anemia, signs of dehydration/KALPANA, transaminitis or significant electrolyte abnormalities. UA showed (+) UTI, and therefore urine cx sent. CT abd/pelvis negative for nephrolithiasis or any other emergent intra-abdominal pathologies. Patient felt much better after tx. Tolerates PO. Given the above, will prescribe PO abx and outpatient follow up. Patient agreeable with plan. Agrees to return to ED for any new or worsening symptoms.

## 2020-06-06 NOTE — ED ADULT NURSE NOTE - OBJECTIVE STATEMENT
Patient presenting to ED with c/o urinary frequency, foul-smelling urine, right flank pain and general malaise for a few days. Patient's daughter at bedside denies patient having any fevers at home. Patient has hx of parkinson's and multiple falls secondary to impaired gait. Denies SOB, denies nausea, denies cough, denies chest pain.

## 2020-06-06 NOTE — ED PROVIDER NOTE - ATTENDING CONTRIBUTION TO CARE
80 year old female, pmhx as documented above, frequent UTIs, presenting with urinary frequency x 1 day. States it feels slightly worse compared to her prior UTIs, which includes dysuria and suprapubic pain described as achy, burning, non-radiating, no palliative or provocative factors, mild severity. Otherwise denies fevers or systemic symptoms. Tolerating PO normally at home.    Vital Signs: I have reviewed the initial vital signs.  Constitutional: NAD, well-nourished, appears stated age, no acute distress.  HEENT: Airway patent, moist MM, no erythema/swelling/deformity of oral structures. EOMI, PERRLA.  CV: regular rate, regular rhythm, well-perfused extremities, 2+ b/l DP and radial pulses equal.  Lungs: BCTA, no increased WOB.  ABD: NTND, no guarding or rebound, no pulsatile mass, no hernias. (+) Left CVA tenderness  MSK: Neck supple, nontender, nl ROM, no stepoff. Chest nontender. Back nontender in TLS spine or to b/l bony structures or flanks. Ext nontender, nl rom, no deformity.   INTEG: Skin warm, dry, no rash.  NEURO: A&Ox3, normal strength, nl sensation throughout, normal speech.   PSYCH: Calm, cooperative, normal affect and interaction.    Will obtain labs, UA, CT abd/pelvis to r/o stone given patient is elderly, pain control PRN, re-eval.

## 2020-06-06 NOTE — ED PROVIDER NOTE - PHYSICAL EXAMINATION
CONSTITUTIONAL: Well-appearing; well-nourished; in no apparent distress.   EYES: PERRL; EOM intact.   ENT: normal nose; no rhinorrhea; normal pharynx with no tonsillar hypertrophy.   NECK: Supple; non-tender; no cervical lymphadenopathy.   CARDIOVASCULAR: Normal S1, S2; no murmurs, rubs, or gallops.   RESPIRATORY: Normal chest excursion with respiration; breath sounds clear and equal bilaterally; no wheezes, rhonchi, or rales.  GI/: Normal bowel sounds; non-distended; + L CVA ttp  MS: No evidence of trauma or deformity. Normal ROM in all four extremities; non-tender to palpation; distal pulses are normal.   SKIN: Normal for age and race; warm; dry; good turgor; no apparent lesions or exudate.   NEURO/PSYCH: A & O x 4; grossly unremarkable. mood and manner are appropriate. Grooming and personal hygiene are appropriate.

## 2020-06-06 NOTE — ED PROVIDER NOTE - NS ED ROS FT
Constitutional: no fever, chills, no recent weight loss, change in appetite or malaise  Cardiac: No chest pain, SOB or edema.  Respiratory: No cough or respiratory distress  GI: + nausea. + L flank pain. No vomiting, diarrhea or abdominal pain.  : No dysuria, frequency, urgency or hematuria  MS: no pain to back or extremities, no loss of ROM, no weakness  Neuro: No headache or weakness. No LOC.  Skin: No skin rash.  Endocrine: + hx of hypothyroidism   Except as documented in the HPI, all other systems are negative.

## 2020-06-16 ENCOUNTER — APPOINTMENT (OUTPATIENT)
Dept: CARDIOLOGY | Facility: CLINIC | Age: 80
End: 2020-06-16

## 2020-06-23 ENCOUNTER — APPOINTMENT (OUTPATIENT)
Dept: CARDIOLOGY | Facility: CLINIC | Age: 80
End: 2020-06-23
Payer: MEDICARE

## 2020-06-23 PROCEDURE — 99442: CPT

## 2020-06-23 RX ORDER — WHEELCHAIR
EACH MISCELLANEOUS
Qty: 1 | Refills: 0 | Status: ACTIVE | COMMUNITY
Start: 2020-06-23

## 2020-08-17 ENCOUNTER — APPOINTMENT (OUTPATIENT)
Dept: NEUROLOGY | Facility: CLINIC | Age: 80
End: 2020-08-17

## 2020-10-07 ENCOUNTER — RX RENEWAL (OUTPATIENT)
Age: 80
End: 2020-10-07

## 2020-10-07 RX ORDER — LEVOTHYROXINE SODIUM 0.09 MG/1
88 TABLET ORAL DAILY
Qty: 90 | Refills: 0 | Status: ACTIVE | COMMUNITY
Start: 2019-12-12 | End: 1900-01-01

## 2020-10-11 ENCOUNTER — EMERGENCY (EMERGENCY)
Facility: HOSPITAL | Age: 80
LOS: 0 days | Discharge: HOME | End: 2020-10-11
Attending: EMERGENCY MEDICINE | Admitting: EMERGENCY MEDICINE
Payer: MEDICARE

## 2020-10-11 VITALS
SYSTOLIC BLOOD PRESSURE: 179 MMHG | WEIGHT: 111.99 LBS | RESPIRATION RATE: 18 BRPM | HEIGHT: 63 IN | DIASTOLIC BLOOD PRESSURE: 71 MMHG | OXYGEN SATURATION: 97 % | HEART RATE: 72 BPM | TEMPERATURE: 99 F

## 2020-10-11 DIAGNOSIS — R39.198 OTHER DIFFICULTIES WITH MICTURITION: ICD-10-CM

## 2020-10-11 DIAGNOSIS — N39.0 URINARY TRACT INFECTION, SITE NOT SPECIFIED: ICD-10-CM

## 2020-10-11 LAB
APPEARANCE UR: CLEAR — SIGNIFICANT CHANGE UP
BACTERIA # UR AUTO: NEGATIVE — SIGNIFICANT CHANGE UP
BILIRUB UR-MCNC: NEGATIVE — SIGNIFICANT CHANGE UP
COLOR SPEC: YELLOW — SIGNIFICANT CHANGE UP
COMMENT - URINE: SIGNIFICANT CHANGE UP
DIFF PNL FLD: ABNORMAL
EPI CELLS # UR: 1 /HPF — SIGNIFICANT CHANGE UP (ref 0–5)
GLUCOSE UR QL: NEGATIVE — SIGNIFICANT CHANGE UP
HYALINE CASTS # UR AUTO: 1 /LPF — SIGNIFICANT CHANGE UP (ref 0–7)
KETONES UR-MCNC: SIGNIFICANT CHANGE UP
LEUKOCYTE ESTERASE UR-ACNC: ABNORMAL
NITRITE UR-MCNC: NEGATIVE — SIGNIFICANT CHANGE UP
PH UR: 7 — SIGNIFICANT CHANGE UP (ref 5–8)
PROT UR-MCNC: SIGNIFICANT CHANGE UP
RBC CASTS # UR COMP ASSIST: 15 /HPF — HIGH (ref 0–4)
SP GR SPEC: 1.02 — SIGNIFICANT CHANGE UP (ref 1.01–1.03)
UROBILINOGEN FLD QL: SIGNIFICANT CHANGE UP
WBC UR QL: 5 /HPF — SIGNIFICANT CHANGE UP (ref 0–5)

## 2020-10-11 PROCEDURE — 99283 EMERGENCY DEPT VISIT LOW MDM: CPT

## 2020-10-11 RX ORDER — CEFDINIR 250 MG/5ML
1 POWDER, FOR SUSPENSION ORAL
Qty: 14 | Refills: 0
Start: 2020-10-11 | End: 2020-10-17

## 2020-10-11 NOTE — ED PROVIDER NOTE - ATTENDING CONTRIBUTION TO CARE
80 year old female, pmhx as documented, presenting with 2 days of urinary frequency and dysuria. Otherwise denies any other active complaints including no fevers, abdominal pain, N/V/D, blood in stool or leg swelling. Per son at bedside, patient at her baseline health status otherwise, tolerating PO at home.    Vital Signs: I have reviewed the initial vital signs.  Constitutional: NAD, well-nourished, appears stated age, no acute distress.  HEENT: Airway patent, moist MM, no erythema/swelling/deformity of oral structures. EOMI, PERRLA.  CV: regular rate, regular rhythm, well-perfused extremities, 2+ b/l DP and radial pulses equal.  Lungs: BCTA, no increased WOB.  ABD: NTND, no guarding or rebound, no pulsatile mass, no hernias.   MSK: Neck supple, nontender, nl ROM, no stepoff. Chest nontender. Back nontender in TLS spine or to b/l bony structures or flanks. Ext nontender, nl rom, no deformity.   INTEG: Skin warm, dry, no rash.  NEURO: A&Ox3, normal strength, nl sensation throughout, normal speech.   PSYCH: Calm, cooperative, normal affect and interaction.    Abd non-tender and patient very well appearing, neuro intact. Will obtained UA, urine cx, re-eval.

## 2020-10-11 NOTE — ED PROVIDER NOTE - CLINICAL SUMMARY MEDICAL DECISION MAKING FREE TEXT BOX
Patient presented with dysuria and urinary frequency, similar to prior UTIs. Otherwise afebrile, HD stable, very well appearing, abd non-tender, at baseline state of health per son at bedside. Obtained UA which did not show definitive infection, but cx sent. Given hx UTIs with similar symptoms, requesting PO abx which will be prescribed and patient to follow up with PMD. Patient and son at bedside agreeable with plan. Agrees to return to ED for any new or worsening symptoms.

## 2020-10-11 NOTE — ED PROVIDER NOTE - CARE PLAN
Principal Discharge DX:	UTI (urinary tract infection)   Principal Discharge DX:	Dysuria  Secondary Diagnosis:	Urinary frequency

## 2020-10-11 NOTE — ED ADULT NURSE NOTE - OBJECTIVE STATEMENT
came in c/o burning urination since yesterday and noted to have increased bouts of confusion . Patient baseline ambulates with a walker at home . alert,oriented.Denied any fever /headache/N/V/D

## 2020-10-11 NOTE — ED PROVIDER NOTE - PATIENT PORTAL LINK FT
You can access the FollowMyHealth Patient Portal offered by [facility] by registering at the following website: [email or web address]. By joining [facility]’s FollowMyHealth portal, you will also be able to view your health information using other applications (apps) compatible with our system.

## 2020-10-11 NOTE — ED PROVIDER NOTE - OBJECTIVE STATEMENT
81 yo female with a pmh of parkinsons and hypothyroid presents for 2 days of urinary frequency/dysuria. denies any other symptoms including fevers, chill, headache, recent illness/travel, cough, abdominal pain, chest pain, or SOB.

## 2020-10-12 LAB
CULTURE RESULTS: SIGNIFICANT CHANGE UP
SPECIMEN SOURCE: SIGNIFICANT CHANGE UP

## 2020-10-13 ENCOUNTER — APPOINTMENT (OUTPATIENT)
Dept: NEUROLOGY | Facility: CLINIC | Age: 80
End: 2020-10-13
Payer: MEDICARE

## 2020-10-13 VITALS
TEMPERATURE: 97 F | WEIGHT: 112 LBS | HEART RATE: 59 BPM | OXYGEN SATURATION: 95 % | SYSTOLIC BLOOD PRESSURE: 105 MMHG | HEIGHT: 63 IN | DIASTOLIC BLOOD PRESSURE: 60 MMHG | BODY MASS INDEX: 19.84 KG/M2

## 2020-10-13 PROCEDURE — 99214 OFFICE O/P EST MOD 30 MIN: CPT

## 2020-10-13 NOTE — PHYSICAL EXAM
[FreeTextEntry1] : A/A/Ox3\par follows 4 step commands\par slightly hard of the hearing\par gained few pounds\par flat affect but gradually more show of emotions \par mild bradykinesia\par mild resting tremor\par decreased dexterity and fine motor\par able to stand up easily using her hands\par shuffling narrow gait

## 2020-10-13 NOTE — ASSESSMENT
[FreeTextEntry1] : Janine is here for F/U with the Dx of PD . she is S/P lumbar laminectomy . she also has mood D/O and recently recurrent UTI.\par She is appearing to be less motivated and perps slightly more depressed. according to the daughter early in thee morning after waking up she does have more of the difficulty with walking. not sure but she thinks it is not changing that much with the Sinemet.\par ( it improves ? independently)\par \par will ask her to be seen in [facility]\par Discussed extensively the deconditioning  that I believe she is in now.\par

## 2020-10-15 NOTE — ED ADULT NURSE NOTE - NSFALLRSKOUTCOME_ED_ALL_ED
Fall with Harm Risk Wartpeel Counseling:  I discussed with the patient the risks of Wartpeel including but not limited to erythema, scaling, itching, weeping, crusting, and pain.

## 2020-11-03 ENCOUNTER — APPOINTMENT (OUTPATIENT)
Dept: NEUROLOGY | Facility: CLINIC | Age: 80
End: 2020-11-03
Payer: MEDICARE

## 2020-11-03 VITALS
TEMPERATURE: 97.3 F | BODY MASS INDEX: 19.84 KG/M2 | HEIGHT: 63 IN | DIASTOLIC BLOOD PRESSURE: 79 MMHG | OXYGEN SATURATION: 97 % | WEIGHT: 112 LBS | HEART RATE: 84 BPM | SYSTOLIC BLOOD PRESSURE: 133 MMHG

## 2020-11-03 PROCEDURE — 99215 OFFICE O/P EST HI 40 MIN: CPT

## 2020-12-10 ENCOUNTER — APPOINTMENT (OUTPATIENT)
Dept: UROGYNECOLOGY | Facility: CLINIC | Age: 80
End: 2020-12-10
Payer: MEDICARE

## 2020-12-10 ENCOUNTER — LABORATORY RESULT (OUTPATIENT)
Age: 80
End: 2020-12-10

## 2020-12-10 VITALS
HEART RATE: 73 BPM | DIASTOLIC BLOOD PRESSURE: 72 MMHG | BODY MASS INDEX: 20.55 KG/M2 | TEMPERATURE: 97.6 F | WEIGHT: 116 LBS | HEIGHT: 63 IN | SYSTOLIC BLOOD PRESSURE: 124 MMHG

## 2020-12-10 DIAGNOSIS — Z78.9 OTHER SPECIFIED HEALTH STATUS: ICD-10-CM

## 2020-12-10 DIAGNOSIS — Z87.440 PERSONAL HISTORY OF URINARY (TRACT) INFECTIONS: ICD-10-CM

## 2020-12-10 LAB
BILIRUB UR QL STRIP: 1
CLARITY UR: CLEAR
COLLECTION METHOD: NORMAL
GLUCOSE UR-MCNC: NORMAL
HCG UR QL: 2 EU/DL
HGB UR QL STRIP.AUTO: NORMAL
KETONES UR-MCNC: 25
LEUKOCYTE ESTERASE UR QL STRIP: NORMAL
NITRITE UR QL STRIP: NORMAL
PH UR STRIP: 5
PROT UR STRIP-MCNC: NORMAL
SP GR UR STRIP: 1.02

## 2020-12-10 PROCEDURE — 99072 ADDL SUPL MATRL&STAF TM PHE: CPT

## 2020-12-10 PROCEDURE — 51701 INSERT BLADDER CATHETER: CPT

## 2020-12-10 PROCEDURE — 81003 URINALYSIS AUTO W/O SCOPE: CPT | Mod: QW

## 2020-12-10 PROCEDURE — 99205 OFFICE O/P NEW HI 60 MIN: CPT

## 2020-12-10 RX ORDER — LEVOTHYROXINE SODIUM 0.09 MG/1
88 TABLET ORAL DAILY
Qty: 90 | Refills: 0 | Status: DISCONTINUED | COMMUNITY
Start: 2019-01-28 | End: 2020-12-10

## 2020-12-11 LAB
APPEARANCE: ABNORMAL
BILIRUBIN URINE: NEGATIVE
BLOOD URINE: NEGATIVE
COLOR: YELLOW
GLUCOSE QUALITATIVE U: NEGATIVE
KETONES URINE: NORMAL
LEUKOCYTE ESTERASE URINE: NEGATIVE
NITRITE URINE: NEGATIVE
PH URINE: 5.5
PROTEIN URINE: NORMAL
SPECIFIC GRAVITY URINE: 1.03
UROBILINOGEN URINE: NORMAL

## 2020-12-13 LAB — BACTERIA UR CULT: NORMAL

## 2020-12-17 ENCOUNTER — NON-APPOINTMENT (OUTPATIENT)
Age: 80
End: 2020-12-17

## 2020-12-21 NOTE — REVIEW OF SYSTEMS
21-Dec-2020 09:30
[Negative] : Heme/Lymph

## 2020-12-31 ENCOUNTER — RX RENEWAL (OUTPATIENT)
Age: 80
End: 2020-12-31

## 2021-01-07 NOTE — PHYSICAL EXAM
[Chaperone Present] : A chaperone was present in the examining room during all aspects of the physical examination [FreeTextEntry1] : Void: 0 cc\par PVR: 30 cc\par Urethra was prepped in sterile fashion and then a sterile catheter was used by me to drain the bladder.\par \par Well healed incision: RLQ, vertical\par normal perineal sensation\par normal perineal reflexes\par negative cough stress test\par positive atrophy\par positive urethral prolapse, 1cm \par no prolapse\par positive urethral hypermobility\par mild urethral tenderness\par no bladder tenderness\par no cervical tenderness\par uterine tenderness\par 3/5 Kegel\par

## 2021-01-07 NOTE — COUNSELING
[FreeTextEntry1] : \par We will notify you of the urine results if they are abnormal\par \par Please call the office if you feel like you have an infection so that we can arrange testing of your urine. If you keep getting infections then I will recommend further evaluation of your kidneys and bladder\par \par Please call the office if you have bleeding or pain or you feel like the bulge has gotten worse\par \par Apply a pea size amount of the cream to the opening of the vagina every night for two weeks followed by three nights per week\par \par Please call my office if you have any issues with the cost or side effects of the medication.\par \par Schedule 3 month follow up with Neha Carias (atrophy/urethral prolapse)

## 2021-01-07 NOTE — DISCUSSION/SUMMARY
[FreeTextEntry1] : \par Urethral prolapse-\par Advised the patient and her daughter that an urethral prolapse is not dangerous or malignant. Advised the patient that the pathophysiology of this condition is likely secondary to atrophic vaginitis.  Since the patient is bothered by the bulge, advised further management for the atrophy. Advised the patient and her daughter to call my office if she starts having bleeding or pain or dysuria),  Precautions provided. The patient and her daughter voiced understanding and are happy with the plan.\par \par Atrophic vaginitis-\par We reviewed the risks, benefits, alternatives and indications of local estrogen therapy and I gave her a handout that covers this information. She does opt to begin this therapy. I have given her a prescription and specific instructions on how to use the estrogen cream, applied with a finger at a low dose for urogenital atrophy.\par \par History of UTI-\par Advised the patient that recurrent UTIs are defined as having 3 or more positive urine culture in 1 year or 2 or more in 6 months, which she has not had. Advised to call the office if she feels like she has an infection so that we can arrange testing of her urine. If she keeps getting infections then I will recommend a workup of further evaluation of her kidneys and bladder and further prevention treatment including estrogen vaginal cream and daily antibiotic suppression. The patient voiced understanding and agrees with the plan.\par \par

## 2021-01-07 NOTE — HISTORY OF PRESENT ILLNESS
[FreeTextEntry1] : \par Pt with pelvic floor dysfunction here for urogynecologic evaluation. She describes: \par Referring provider: Dr Shaffer\par PCP: Dr Romeo\par \par Chief PFD: urethral bulge\par \par History of Parkinsons\par \par 10/11/20: UCX: >10 K urogenital adam\par 6/6/20 UCX: e coli R amp\par \par Records reviewed:\par 12/30/2020: ultrasound: uterus 5.2x4.7cm, EE 4mm, fluid in cavity, normal ROV, LOV not visualized, performed by Dr Shaffer\par \par Pelvic organ prolapse: s/p tubal, s/p appy, +bulge, for around 6 weeks, seen by gyn and was told it was from the urethra, patient's daughter pushed it in and since then it has been less bothersome to the patient, denies splinting\par Stress urinary incontinence: denies\par Overactive bladder syndrome: hx of back surgery, denies\par Voiding dysfunction: denies Incomplete bladder emptying, no hesitancy \par Lower urinary tract/vaginal symptoms: no recurrent UTIs per year, no hematuria, no dysuria, no bladder pain \par Fecal incontinence: denies\par Defecatory dysfunction: sausage\par Sexual dysfunction: not active\par Pelvic pain: denies\par Vaginal dryness : denies\par \par Her pelvic floor symptoms are significantly bothersome and negatively impacting her quality of life. \par \par

## 2021-01-22 LAB
ANION GAP SERPL CALC-SCNC: 13 MMOL/L
BUN SERPL-MCNC: 18 MG/DL
CALCIUM SERPL-MCNC: 9.3 MG/DL
CHLORIDE SERPL-SCNC: 98 MMOL/L
CO2 SERPL-SCNC: 25 MMOL/L
CREAT SERPL-MCNC: 0.9 MG/DL
GLUCOSE SERPL-MCNC: 106 MG/DL
POTASSIUM SERPL-SCNC: 4.2 MMOL/L
SODIUM SERPL-SCNC: 136 MMOL/L

## 2021-01-23 ENCOUNTER — NON-APPOINTMENT (OUTPATIENT)
Age: 81
End: 2021-01-23

## 2021-01-25 ENCOUNTER — APPOINTMENT (OUTPATIENT)
Dept: UROLOGY | Facility: CLINIC | Age: 81
End: 2021-01-25

## 2021-02-01 ENCOUNTER — NON-APPOINTMENT (OUTPATIENT)
Age: 81
End: 2021-02-01

## 2021-02-01 ENCOUNTER — RX RENEWAL (OUTPATIENT)
Age: 81
End: 2021-02-01

## 2021-02-08 ENCOUNTER — RX RENEWAL (OUTPATIENT)
Age: 81
End: 2021-02-08

## 2021-02-09 ENCOUNTER — APPOINTMENT (OUTPATIENT)
Dept: NEUROLOGY | Facility: CLINIC | Age: 81
End: 2021-02-09

## 2021-03-03 RX ORDER — DONEPEZIL HYDROCHLORIDE 10 MG/1
10 TABLET, ORALLY DISINTEGRATING ORAL DAILY
Qty: 90 | Refills: 0 | Status: DISCONTINUED | COMMUNITY
Start: 2020-01-27 | End: 2021-03-03

## 2021-03-05 ENCOUNTER — INPATIENT (INPATIENT)
Facility: HOSPITAL | Age: 81
LOS: 2 days | Discharge: HOME | End: 2021-03-08
Attending: INTERNAL MEDICINE | Admitting: INTERNAL MEDICINE
Payer: MEDICARE

## 2021-03-05 VITALS
SYSTOLIC BLOOD PRESSURE: 189 MMHG | WEIGHT: 125 LBS | HEIGHT: 63 IN | RESPIRATION RATE: 18 BRPM | OXYGEN SATURATION: 98 % | TEMPERATURE: 97 F | DIASTOLIC BLOOD PRESSURE: 79 MMHG | HEART RATE: 71 BPM

## 2021-03-05 LAB
ANION GAP SERPL CALC-SCNC: 11 MMOL/L — SIGNIFICANT CHANGE UP (ref 7–14)
BASOPHILS # BLD AUTO: 0.06 K/UL — SIGNIFICANT CHANGE UP (ref 0–0.2)
BASOPHILS NFR BLD AUTO: 1.1 % — HIGH (ref 0–1)
BUN SERPL-MCNC: 11 MG/DL — SIGNIFICANT CHANGE UP (ref 10–20)
CALCIUM SERPL-MCNC: 9.4 MG/DL — SIGNIFICANT CHANGE UP (ref 8.5–10.1)
CHLORIDE SERPL-SCNC: 101 MMOL/L — SIGNIFICANT CHANGE UP (ref 98–110)
CO2 SERPL-SCNC: 25 MMOL/L — SIGNIFICANT CHANGE UP (ref 17–32)
CREAT SERPL-MCNC: 0.8 MG/DL — SIGNIFICANT CHANGE UP (ref 0.7–1.5)
D DIMER BLD IA.RAPID-MCNC: 155 NG/ML DDU — SIGNIFICANT CHANGE UP (ref 0–230)
EOSINOPHIL # BLD AUTO: 0.12 K/UL — SIGNIFICANT CHANGE UP (ref 0–0.7)
EOSINOPHIL NFR BLD AUTO: 2.3 % — SIGNIFICANT CHANGE UP (ref 0–8)
GLUCOSE SERPL-MCNC: 98 MG/DL — SIGNIFICANT CHANGE UP (ref 70–99)
HCT VFR BLD CALC: 41.9 % — SIGNIFICANT CHANGE UP (ref 37–47)
HGB BLD-MCNC: 13.9 G/DL — SIGNIFICANT CHANGE UP (ref 12–16)
IMM GRANULOCYTES NFR BLD AUTO: 0.4 % — HIGH (ref 0.1–0.3)
LYMPHOCYTES # BLD AUTO: 0.94 K/UL — LOW (ref 1.2–3.4)
LYMPHOCYTES # BLD AUTO: 17.8 % — LOW (ref 20.5–51.1)
MCHC RBC-ENTMCNC: 30.8 PG — SIGNIFICANT CHANGE UP (ref 27–31)
MCHC RBC-ENTMCNC: 33.2 G/DL — SIGNIFICANT CHANGE UP (ref 32–37)
MCV RBC AUTO: 92.9 FL — SIGNIFICANT CHANGE UP (ref 81–99)
MONOCYTES # BLD AUTO: 0.44 K/UL — SIGNIFICANT CHANGE UP (ref 0.1–0.6)
MONOCYTES NFR BLD AUTO: 8.3 % — SIGNIFICANT CHANGE UP (ref 1.7–9.3)
NEUTROPHILS # BLD AUTO: 3.69 K/UL — SIGNIFICANT CHANGE UP (ref 1.4–6.5)
NEUTROPHILS NFR BLD AUTO: 70.1 % — SIGNIFICANT CHANGE UP (ref 42.2–75.2)
NRBC # BLD: 0 /100 WBCS — SIGNIFICANT CHANGE UP (ref 0–0)
NT-PROBNP SERPL-SCNC: 257 PG/ML — SIGNIFICANT CHANGE UP (ref 0–300)
PLATELET # BLD AUTO: 176 K/UL — SIGNIFICANT CHANGE UP (ref 130–400)
POTASSIUM SERPL-MCNC: 4.4 MMOL/L — SIGNIFICANT CHANGE UP (ref 3.5–5)
POTASSIUM SERPL-SCNC: 4.4 MMOL/L — SIGNIFICANT CHANGE UP (ref 3.5–5)
RBC # BLD: 4.51 M/UL — SIGNIFICANT CHANGE UP (ref 4.2–5.4)
RBC # FLD: 12.9 % — SIGNIFICANT CHANGE UP (ref 11.5–14.5)
SARS-COV-2 RNA SPEC QL NAA+PROBE: SIGNIFICANT CHANGE UP
SODIUM SERPL-SCNC: 137 MMOL/L — SIGNIFICANT CHANGE UP (ref 135–146)
TROPONIN T SERPL-MCNC: <0.01 NG/ML — SIGNIFICANT CHANGE UP
WBC # BLD: 5.27 K/UL — SIGNIFICANT CHANGE UP (ref 4.8–10.8)
WBC # FLD AUTO: 5.27 K/UL — SIGNIFICANT CHANGE UP (ref 4.8–10.8)

## 2021-03-05 PROCEDURE — 93010 ELECTROCARDIOGRAM REPORT: CPT

## 2021-03-05 PROCEDURE — 71045 X-RAY EXAM CHEST 1 VIEW: CPT | Mod: 26

## 2021-03-05 PROCEDURE — 99223 1ST HOSP IP/OBS HIGH 75: CPT

## 2021-03-05 PROCEDURE — 99285 EMERGENCY DEPT VISIT HI MDM: CPT

## 2021-03-05 RX ORDER — CHLORHEXIDINE GLUCONATE 213 G/1000ML
1 SOLUTION TOPICAL
Refills: 0 | Status: DISCONTINUED | OUTPATIENT
Start: 2021-03-05 | End: 2021-03-08

## 2021-03-05 RX ORDER — SIMVASTATIN 20 MG/1
20 TABLET, FILM COATED ORAL AT BEDTIME
Refills: 0 | Status: DISCONTINUED | OUTPATIENT
Start: 2021-03-05 | End: 2021-03-08

## 2021-03-05 RX ORDER — DONEPEZIL HYDROCHLORIDE 10 MG/1
10 TABLET, FILM COATED ORAL AT BEDTIME
Refills: 0 | Status: DISCONTINUED | OUTPATIENT
Start: 2021-03-05 | End: 2021-03-08

## 2021-03-05 RX ORDER — LEVOTHYROXINE SODIUM 125 MCG
88 TABLET ORAL DAILY
Refills: 0 | Status: DISCONTINUED | OUTPATIENT
Start: 2021-03-05 | End: 2021-03-08

## 2021-03-05 RX ORDER — ESCITALOPRAM OXALATE 10 MG/1
20 TABLET, FILM COATED ORAL DAILY
Refills: 0 | Status: DISCONTINUED | OUTPATIENT
Start: 2021-03-05 | End: 2021-03-08

## 2021-03-05 RX ORDER — CARBIDOPA AND LEVODOPA 25; 100 MG/1; MG/1
1 TABLET ORAL
Refills: 0 | Status: DISCONTINUED | OUTPATIENT
Start: 2021-03-05 | End: 2021-03-08

## 2021-03-05 RX ORDER — PANTOPRAZOLE SODIUM 20 MG/1
40 TABLET, DELAYED RELEASE ORAL
Refills: 0 | Status: DISCONTINUED | OUTPATIENT
Start: 2021-03-05 | End: 2021-03-08

## 2021-03-05 RX ORDER — ENOXAPARIN SODIUM 100 MG/ML
40 INJECTION SUBCUTANEOUS DAILY
Refills: 0 | Status: DISCONTINUED | OUTPATIENT
Start: 2021-03-05 | End: 2021-03-08

## 2021-03-05 RX ORDER — CEFTRIAXONE 500 MG/1
1000 INJECTION, POWDER, FOR SOLUTION INTRAMUSCULAR; INTRAVENOUS ONCE
Refills: 0 | Status: COMPLETED | OUTPATIENT
Start: 2021-03-05 | End: 2021-03-05

## 2021-03-05 RX ORDER — CEFTRIAXONE 500 MG/1
INJECTION, POWDER, FOR SOLUTION INTRAMUSCULAR; INTRAVENOUS
Refills: 0 | Status: DISCONTINUED | OUTPATIENT
Start: 2021-03-05 | End: 2021-03-08

## 2021-03-05 RX ORDER — CEFTRIAXONE 500 MG/1
1000 INJECTION, POWDER, FOR SOLUTION INTRAMUSCULAR; INTRAVENOUS EVERY 24 HOURS
Refills: 0 | Status: DISCONTINUED | OUTPATIENT
Start: 2021-03-06 | End: 2021-03-08

## 2021-03-05 RX ADMIN — SIMVASTATIN 20 MILLIGRAM(S): 20 TABLET, FILM COATED ORAL at 22:07

## 2021-03-05 RX ADMIN — CEFTRIAXONE 100 MILLIGRAM(S): 500 INJECTION, POWDER, FOR SOLUTION INTRAMUSCULAR; INTRAVENOUS at 22:06

## 2021-03-05 RX ADMIN — DONEPEZIL HYDROCHLORIDE 10 MILLIGRAM(S): 10 TABLET, FILM COATED ORAL at 22:07

## 2021-03-05 RX ADMIN — CARBIDOPA AND LEVODOPA 1 TABLET(S): 25; 100 TABLET ORAL at 22:07

## 2021-03-05 NOTE — H&P ADULT - NSHPPHYSICALEXAM_GEN_ALL_CORE
Vital Signs (24 Hrs):  T(C): 36.3 (03-05-21 @ 15:45), Max: 36.3 (03-05-21 @ 15:45)  HR: 71 (03-05-21 @ 15:45) (71 - 71)  BP: 189/79 (03-05-21 @ 15:45) (189/79 - 189/79)  RR: 18 (03-05-21 @ 15:45) (18 - 18)  SpO2: 98% (03-05-21 @ 15:45) (98% - 98%)    PHYSICAL EXAM:  GENERAL: NAD, lying in bed comfortably  HEAD:  Atraumatic, Normocephalic  EYES: EOMI, PERRLA, conjunctiva and sclera clear  ENT: Moist mucous membranes  NECK: Supple, No JVD  CHEST/LUNG: Clear to auscultation bilaterally; No rales, rhonchi, wheezing, or rubs. Unlabored respirations  HEART: Regular rate and rhythm; No murmurs, rubs, or gallops  ABDOMEN: Bowel sounds present; Soft, Nontender, Nondistended. No hepatomegally  EXTREMITIES:  2+ Peripheral Pulses, brisk capillary refill. No clubbing, cyanosis, or edema  NERVOUS SYSTEM:  Alert & Oriented X3, speech clear. No deficits   MSK: FROM all 4 extremities, full and equal strength  SKIN: No rashes or lesions

## 2021-03-05 NOTE — ED PROVIDER NOTE - ATTENDING CONTRIBUTION TO CARE
80 yo F pmh of parkinsons presents with chest pain and shortness of breath. Patient reports weakness and shortness of breath for the last 2 days. Patient received the covid vaccine yesterday. no fevers. State that yesterday evening started to feel genrealized weakness, some shortness of breath and chest pain. midsternal, sharp, non radiating, 6/10. no palpitations. no n/v/d, no abdominal pain. no leg swelling or pain.     CONSTITUTIONAL: Well-developed; well-nourished; in no acute distress.   SKIN: warm, dry  HEAD: Normocephalic; atraumatic.  EYES: PERRL, EOMI, no conjunctival erythema  ENT: No nasal discharge; airway clear.  NECK: Supple; non tender.  CARD: S1, S2 normal;  Regular rate and rhythm.   RESP: No wheezes, rales or rhonchi. + tachypnea, non labored breathing.   ABD: soft non tender, non distended, no rebound or guarding  EXT: Normal ROM.  5/5 strength in all 4 extremities   LYMPH: No acute cervical adenopathy.  NEURO: Alert, oriented, grossly unremarkable. neurovascularly intact  PSYCH: Cooperative, appropriate.

## 2021-03-05 NOTE — ED ADULT NURSE NOTE - NS ED PATIENT SAFETY CONCERN
Please Follow-up with:     [facility]: [id number] [facility] [address], [place] [address] [phone]    Take a bland diet and drink plenty of fluids and electrolyte solutions.  Zofran as needed for nausea.  Read instructions.  Self isolate until you get your coronavirus results.  If you turn positive you will need to quarantine.  Read the [facility] information as well as your instructions.  Return for changes or worsening or go to the emergency department  Patient Education     Vomiting (Adult)  Vomiting is a common symptom that may be due to different causes. These include gastroenteritis (\"stomach flu\"), food poisoning and gastritis. There are other more serious causes of vomiting which may be hard to diagnose early in the illness. Therefore, it is important to watch for the warning signs listed below.  The main danger from repeated vomiting is dehydration. This is due to excess loss of water and minerals from the body. When this occurs, your body fluids must be replaced.  Home care  · If symptoms are severe, rest at home for the next 24 hours.  · Because your symptoms may be from an infection, wash your hands often and well. If soap and water are not available, use alcohol-based  to keep from spreading the infection to others.  · Wash your hands for at least 20 seconds. Humming the happy birthday song twice while you wash is an easy way to make sure you've washed for 20 seconds.  · Wash your hands after using the toilet, before and after preparing food, before eating food, after changing a diaper, cleaning a wound, caring for a sick person, and blowing your nose, coughing, or sneezing. You should also wash your hands after caring for someone who is sick, touching pet food, or treats, and touching an animal, or animal waste.  · You may use acetaminophen or NSAID medicines like ibuprofen or naproxen to control fever, unless another medicine was prescribed. If you have chronic  liver or kidney disease or ever had a stomach ulcer or gastrointestinal bleeding, talk with your doctor before using these medicines. Aspirin should never be used in anyone under 18 years of age who is ill with a fever. It may cause severe liver damage. Don't use NSAID medicines if you are already taking one for another condition (like arthritis) or are on aspirin (such as for heart disease, or after a stroke)  · Don't use tobacco and or drink alcohol, which may worsen your symptoms.  · If medicines for vomiting were prescribed, take as directed.  · Once vomiting stops, then follow these guidelines:  During the first 12 to 24 hours follow the diet below:  · Fruit juices. Apple, grape juice, clear fruit drinks, and electrolyte replacement drinks.  · Beverages. Soft drinks without caffeine; mineral water (plain or flavored), decaffeinated tea and coffee.  · Soups. Clear broth and bouillon  · Desserts. Plain gelatin, ice pops, and fruit juice bars. As you feel better, you may add 6 to 8 ounces of yogurt per day.  During the next 24 hours you may add the following to the above:  · Hot cereal, plain toast, bread, rolls, crackers  · Plain noodles, rice, mashed potatoes, chicken noodle or rice soup  · Unsweetened canned fruit such as applesauce, bananas (avoid pineapple and citrus)  · Limit caffeine and chocolate. No spices or seasonings except salt.  During the next 24 hours:  Gradually resume a normal diet, as you feel better and your symptoms lessen.  Follow-up care  Follow up with your healthcare provider, or as advised.  When to seek medical advice  Call your healthcare provider right away if any of these occur:  · Constant right-sided lower belly pain or increasing general belly pain  · Continued vomiting (unable to keep liquids down) for 24 hours  · Vomiting blood or coffee grounds  · Swollen belly  · Frequent diarrhea (more than 5 times a day); blood (red or black color) or mucus in diarrhea  · Reduced urine output  or extreme thirst  · Weakness, dizziness or fainting  · Unusually drowsy or confused  · Fever of 100.4°F (38°C) oral or higher, or as directed  · Yellow color of the eyes or skin  Date Last Reviewed: 3/1/2018  © 3850-4446 [employer]. [address] [place] [address]. All rights reserved. This information is not intended as a substitute for professional medical care. Always follow your healthcare professional's instructions.           Patient Education     Vomiting (Adult)  Vomiting is a common symptom that may be due to different causes. These include gastroenteritis (\"stomach flu\"), food poisoning and gastritis. There are other more serious causes of vomiting which may be hard to diagnose early in the illness. Therefore, it is important to watch for the warning signs listed below.  The main danger from repeated vomiting is dehydration. This is due to excess loss of water and minerals from the body. When this occurs, your body fluids must be replaced.  Home care  · If symptoms are severe, rest at home for the next 24 hours.  · Because your symptoms may be from an infection, wash your hands often and well. If soap and water are not available, use alcohol-based  to keep from spreading the infection to others.  · Wash your hands for at least 20 seconds. Humming the happy birthday song twice while you wash is an easy way to make sure you've washed for 20 seconds.  · Wash your hands after using the toilet, before and after preparing food, before eating food, after changing a diaper, cleaning a wound, caring for a sick person, and blowing your nose, coughing, or sneezing. You should also wash your hands after caring for someone who is sick, touching pet food, or treats, and touching an animal, or animal waste.  · You may use acetaminophen or NSAID medicines like ibuprofen or naproxen to control fever, unless another medicine was prescribed. If you have chronic liver or kidney disease or  ever had a stomach ulcer or gastrointestinal bleeding, talk with your doctor before using these medicines. Aspirin should never be used in anyone under 18 years of age who is ill with a fever. It may cause severe liver damage. Don't use NSAID medicines if you are already taking one for another condition (like arthritis) or are on aspirin (such as for heart disease, or after a stroke)  · Don't use tobacco and or drink alcohol, which may worsen your symptoms.  · If medicines for vomiting were prescribed, take as directed.  · Once vomiting stops, then follow these guidelines:  During the first 12 to 24 hours follow the diet below:  · Fruit juices. Apple, grape juice, clear fruit drinks, and electrolyte replacement drinks.  · Beverages. Soft drinks without caffeine; mineral water (plain or flavored), decaffeinated tea and coffee.  · Soups. Clear broth and bouillon  · Desserts. Plain gelatin, ice pops, and fruit juice bars. As you feel better, you may add 6 to 8 ounces of yogurt per day.  During the next 24 hours you may add the following to the above:  · Hot cereal, plain toast, bread, rolls, crackers  · Plain noodles, rice, mashed potatoes, chicken noodle or rice soup  · Unsweetened canned fruit such as applesauce, bananas (avoid pineapple and citrus)  · Limit caffeine and chocolate. No spices or seasonings except salt.  During the next 24 hours:  Gradually resume a normal diet, as you feel better and your symptoms lessen.  Follow-up care  Follow up with your healthcare provider, or as advised.  When to seek medical advice  Call your healthcare provider right away if any of these occur:  · Constant right-sided lower belly pain or increasing general belly pain  · Continued vomiting (unable to keep liquids down) for 24 hours  · Vomiting blood or coffee grounds  · Swollen belly  · Frequent diarrhea (more than 5 times a day); blood (red or black color) or mucus in diarrhea  · Reduced urine output or extreme  thirst  · Weakness, dizziness or fainting  · Unusually drowsy or confused  · Fever of 100.4°F (38°C) oral or higher, or as directed  · Yellow color of the eyes or skin  Date Last Reviewed: 3/1/2018  © 0913-9073 [employer]. [address] [place] [address]. All rights reserved. This information is not intended as a substitute for professional medical care. Always follow your healthcare professional's instructions.           Patient Education     Coronavirus Disease 2019 (COVID-19): Overview  Coronavirus disease 2019 (COVID-19) is a respiratory illness. It's caused by a new (novel) coronavirus. There are many types of coronavirus. Coronaviruses are a very common cause of colds and bronchitis. They may sometimes cause lung infection (pneumonia). Symptoms can range from mild to severe. Some people have no symptoms. These viruses are also found in some animals.   All 50 states in the [place] have reported cases of COVID-19. Most states report \"community spread\" of COVID-19. This means the source of the illness is not known. COVID-19 is a rapidly-emerging infectious disease. This means that scientists are actively researching it. There are information updates regularly.   Public health officials are working to find the source. How the virus spreads is not yet fully understood, but it seems to spread and infect people fairly easily. Some people who have been infected in an area may not be sure how or where they were infected. The virus may be spread through droplets of fluid that a person coughs or sneezes into the air. It may be spread if you touch a surface with the virus on it, such as a handle or object, and then touch your eyes, nose, or mouth.   For the latest information, visit the CDC website at www.cdc.gov/coronavirus/2019-ncov. Or call 508-MUH-QKMN ([phone]).   What are the symptoms of COVID-19?  Some people have no symptoms or mild symptoms. Symptoms can also vary from person to person.  As experts learn more about COVID-19, other symptoms are being reported. Symptoms may appear 2 to 14 days after contact with the virus:   · Fever  · Coughing  · Trouble breathing or feeling short of breath  · Sore throat  · Runny nose  · Headache and body aches  · Chills or repeated shaking with chills  · Fatigue  · Loss of appetite  · Nausea, vomiting, diarrhea, or abdominal pain  · Loss of sense of smell and taste  You can check your symptoms with the CDC’s Coronavirus Self-.   What are possible complications from COVID-19?  In many cases, this virus can cause infection (pneumonia) in both lungs. In some cases, this can cause death. Certain people are at higher risk for complications. This includes older adults and people with serious chronic health conditions such as heart or lung disease, diabetes, or kidney disease. It includes people with health conditions that suppress the immune system. And it includes people taking medicines that suppress the immune system.   As experts learn more about COVID-19, other complications are being reported that may be linked to COVID-19. Rarely, some children have developed severe complications called multisystem inflammatory syndrome in children (MIS-C). MIS-C seems to be similar to [place] disease, a rare condition causing inflammation of blood vessels and body organs. It's not yet known if MIS-C happens only in children, or if adults are also at risk. It's also not known if it's related to COVID-19, because many children, but not all, have tested positive for the virus. Experts continue to study MIS-C. The CDC advises healthcare providers to report to local health departments any person under age 21 years old who is ill enough to be in the hospital and has all of the following:   · A fever over 100.4°F (38.0°C) for more than 24 hours and a positive SARS-CoV-2 test or exposure to the virus in the last 4 weeks  · Inflammation in at least 2 organs such as the heart,  lungs, or kidneys with lab tests that show inflammation  · No other diagnoses besides COVID-19 explain the child's symptoms  How is COVID-19 diagnosed?  Your healthcare provider will ask about your symptoms. He or she will ask where you live, and about your recent travel, and any contact with sick people. If your healthcare provider thinks you may have COVID-19, he or she will consider whether to test you for COVID-19. This depends on the availability of testing in your area, and how sick you are. Follow all instructions from your healthcare provider. Guidelines for testing may change as more information about the virus becomes available. Currently, COVID-19 is diagnosed by:   · Nose-throat swab.  A swab is wiped inside your nose to the back of your throat. This is a viral test to tell you if you have a current COVID-19 infection.  If your healthcare provider thinks or confirms that you have COVID-19, you may have other tests. These tests may include:   · Antibody blood test.  Antibody tests are being looked at to find out if a person has previously been infected with the virus and may now have antibodies such as SARS AB IgG in their blood to give some immunity. The accuracy and availability of antibody tests vary. An antibody test may not be able to show if you have a current infection because it can take up to a few weeks after infection to make antibodies. It's not yet known how long immunity lasts after being infected with the virus.  · Sputum culture.  A small sample of mucus coughed from your lungs (sputum) may be collected if you have a moist cough. It may be checked for the virus or to look for pneumonia.  · Imaging tests.  You may have a chest X-ray or CT scan.  How is COVID-19 treated?  There is currently no medicine proven to prevent or treat the virus. Some experimental medicines are being tested for COVID-19. Other medicines used to treat other conditions are being looked at for COVID-19, but these are  not currently approved to treat it.   The most proven treatments right now are those to help your body while it fights the virus. This is known as supportive care. Supportive care may include:   · Getting rest.  This helps your body fight the illness.  · Staying hydrated.  Drinking liquids is the best way to prevent dehydration.. Try to drink 6 to 8 glasses of liquids every day, or as advised by your provider. Also check with your provider about which fluids are best for you. Don't drink fluids that contain caffeine or alcohol.  · Taking over-the-counter (OTC) pain medicine.  These are used to help ease pain and reduce fever. Follow your healthcare provider's instructions for which OTC medicine to use.  For severe illness, you may need to stay in the hospital. Care during severe illness may include:   · IV (intravenous) fluids.  These are given through a vein to help keep your body hydrated.  · Oxygen. You may be given supplemental oxygen or ventilation with a breathing machine (ventilator). This is done so you get enough oxygen in your body.  · Prone positioning.  Depending on how sick you are during your hospital stay, your healthcare team may turn you regularly on your stomach. This is called prone positioning. It helps increase the amount of oxygen you get to your lungs. Follow your healthcare team's instructions on position changes while you're in the hospital. Also follow their discharge advice on the best positions to help your breathing once you go home.  People who have had COVID-19 and are fully recovered may be asked by their healthcare team to consider donating plasma. This is called COVID-19 convalescent plasma donation. Plasma from people fully recovered from COVID-19 may contain antibodies to help fight COVID-19 in people who are currently seriously ill with the disease. It's not fully known if the donated plasma will work well as a treatment, but the FDA is looking at it and has asked the [employer] to help with plasma donation and collection. Talk with your provider to learn more about convalescent plasma donation and whether you qualify to donate.   Are you at risk for COVID-19?  You are at risk for COVID-19 if you have had close contact with someone with the virus, or if you live in or traveled to an area with cases of it. Close contact means being within about 6 feet of someone, or living in the same house or visiting a person who has or may have COVID-19. Some recent studies suggest that COVID-19 may be spread by people who are not showing symptoms.   Date last modified: 5/20/2020  [employer] last reviewed this educational content on 1/1/2020  © 0345-7980 [employer]. [address] [place] [address], [place] [address]. All rights reserved. This information is not intended as a substitute for professional medical care. Always follow your healthcare professional's instructions.            No

## 2021-03-05 NOTE — ED PROVIDER NOTE - NS ED ROS FT
Constitutional: no fever, chills, no recent weight loss, change in appetite   Eyes: no redness/discharge/pain/vision changes  ENT: no rhinorrhea/ear pain/sore throat  Cardiac: No edema.  Respiratory: No cough or respiratory distress  GI: No nausea, vomiting, diarrhea or abdominal pain.  : No dysuria, frequency, urgency or hematuria  MS: no pain to back or extremities, no loss of ROM  Neuro: No headache. No LOC.  Skin: No skin rash.  Endocrine: No history of thyroid disease or diabetes.  Except as documented in the HPI, all other systems are negative.

## 2021-03-05 NOTE — H&P ADULT - NSHPLABSRESULTS_GEN_ALL_CORE
13.9   5.27  )-----------( 176      ( 05 Mar 2021 16:36 )             41.9   03-05-21 @ 16:36    137  |  101  |  11             --------------------------< 98     4.4  |  25  | 0.8    eGFR AA: 80  eGFR N-AA: 69    Calcium: 9.4  Phosphorus: --  Magnesium: --    AST: --    ALT: --  AlkPhos: --  Protein: --  Albumin: --  TBili: --  D-Bili: --    Troponin T, Serum: <0.01 ng/mL (03.05.21 @ 16:36)  < from: 12 Lead ECG (03.05.21 @ 15:44) >    Diagnosis Line Normal sinus rhythm with sinus arrhythmia  Normal ECG    < end of copied text >

## 2021-03-05 NOTE — H&P ADULT - HISTORY OF PRESENT ILLNESS
82 yo F with pmh of Parkinson's disease ( AAx2-3 at baseline / uses a walker), hypothyroidism, HLD presents with 2d history of generalized weakness after receiving the first dose of moderna vaccine 2 days ago. The patient also notes that she had an episode of dyspnea at rest and substernal non-radiating chest pressure which lasted ~ 30 minutes and went away on itself. She is unsure whether there are any alleviating or aggravating factors which led up to her chest pressure and dyspnea. She also notes that she uses 4 pillows to sleep at home. Additional history was obtained from the daughter who notes that her mother has dyspnea on exertion at baseline and usually feels shortness of breath when she walks to the bathroom at home. The patient also reports increased urinary frequency from baseline, however she denies suprapubic abdominal pressure/ pain or dysuria. The patient denies fevers/chills, weight loss, fatigue, dizziness, cough, active chest pressure at time of interview, dyspnea at rest at time of interview, abdominal pain, nausea vomiting, diarrhea, constipation, hematochezia, melena. All ROS negative except as above.     T(C): 36.3 , HR: 71, BP: 189/79, RR: 18, SpO2: 98%on room air  Labs: unremarkable         82 yo F with pmh of Parkinson's disease ( AAx2-3 at baseline / uses a walker), hypothyroidism, HLD, and microscopic hematuria (follows Dr. Beltrán/ [place]) presents with 2d history of generalized weakness after receiving the first dose of moderna vaccine 2 days ago. The patient also notes that she had an episode of dyspnea at rest and substernal non-radiating chest pressure which lasted ~ 30 minutes and went away on itself. She is unsure whether there are any alleviating or aggravating factors which led up to her chest pressure and dyspnea. She also notes that she uses 4 pillows to sleep at home. Additional history was obtained from the daughter who notes that her mother has dyspnea on exertion at baseline and usually feels shortness of breath when she walks to the bathroom at home. The patient also reports increased urinary frequency from baseline, however she denies suprapubic abdominal pressure/ pain or dysuria. The patient denies fevers/chills, weight loss, fatigue, dizziness, cough, active chest pressure at time of interview, dyspnea at rest at time of interview, abdominal pain, nausea vomiting, diarrhea, constipation, hematochezia, melena. All ROS negative except as above.     T(C): 36.3 , HR: 71, BP: 189/79, RR: 18, SpO2: 98%on room air  Labs: unremarkable

## 2021-03-05 NOTE — ED ADULT NURSE NOTE - OBJECTIVE STATEMENT
Pt. came to ED for heavy breathing that started at 0400. C/o pressure on chest.  Yesterday c/o weakness and nausea. Dirst dose of covid vaccine was day before. BARNETT is WNL per daughter.

## 2021-03-05 NOTE — ED ADULT NURSE NOTE - NSIMPLEMENTINTERV_GEN_ALL_ED
Implemented All Fall with Harm Risk Interventions:  [place] to call system. Call bell, personal items and telephone within reach. Instruct patient to call for assistance. Room bathroom lighting operational. Non-slip footwear when patient is off stretcher. Physically safe environment: no spills, clutter or unnecessary equipment. Stretcher in lowest position, wheels locked, appropriate side rails in place. Provide visual cue, wrist band, yellow gown, etc. Monitor gait and stability. Monitor for mental status changes and reorient to person, place, and time. Review medications for side effects contributing to fall risk. Reinforce activity limits and safety measures with patient and family. Provide visual clues: red socks.

## 2021-03-05 NOTE — ED PROVIDER NOTE - PHYSICAL EXAMINATION
CONSTITUTIONAL: Well-appearing; well-nourished; in no apparent distress.   CARDIOVASCULAR: Normal S1, S2; no murmurs, rubs, or gallops.   RESPIRATORY: mild tachypnea, normal chest excursion with respiration; breath sounds clear and equal bilaterally; no wheezes, rhonchi, or rales.  GI/: non-distended; non-tender; no palpable organomegaly.   SKIN: Normal for age and race; warm; dry; good turgor; no apparent lesions or exudate.   NEURO/PSYCH: A & O x 4; grossly unremarkable. mood and manner are appropriate.

## 2021-03-05 NOTE — ED PROVIDER NOTE - CLINICAL SUMMARY MEDICAL DECISION MAKING FREE TEXT BOX
Patient presents with chest pain and shortness of breath. labs, ekg, cxr done. Admitted for further management.

## 2021-03-05 NOTE — ED PROVIDER NOTE - OBJECTIVE STATEMENT
daughter helps provide hx daughter helps provide hx 2/2 pt poor historian at baseline; pt with pmhx parkinsons presents to ED for eval of weakness since yesterday and chest pressure that started today. pain is sharp, nonradiating, moderate  denies exacerbating or relieving factors. pts daughter sts she rec'd moderna #1 2 days ago and yesterday reported some weakness which she attributed to post-vaccine SE; today noticed pt was tachypneic (BARNETT at baseline) and c/o chest pressure. Denies fever/chill/HA/dizziness/abd pain/n/v/d/ black stool/bloody stool/urinary sxs daughter helps provide hx 2/2 pt poor historian at baseline; pt with pmhx parkinsons presents to ED for eval of weakness since yesterday and chest pressure that started today. pain is sharp, nonradiating, moderate. denies exacerbating or relieving factors. pts daughter sts she rec'd moderna #1 2 days ago and yesterday reported some weakness which she attributed to post-vaccine SE; today noticed pt was tachypneic (BARNETT at baseline) and c/o chest pressure. Denies fever/chill/HA/dizziness/abd pain/n/v/d/ black stool/bloody stool/urinary sxs

## 2021-03-05 NOTE — H&P ADULT - ASSESSMENT
82 yo F with pmh of Parkinson's disease ( AAx2-3 at baseline / uses a walker), hypothyroidism, HLD presents with 2d history of generalized weakness after receiving the first dose of moderna vaccine 2 days ago. The patient also notes that she had an episode of dyspnea at rest and substernal non-radiating chest pressure which lasted ~ 30 minutes and went away on itself. She is unsure whether there are any alleviating or aggravating factors which led up to her chest pressure and dyspnea. She also notes that she uses 4 pillows to sleep at home. Additional history was obtained from the daughter who notes that her mother has dyspnea on exertion at baseline and usually feels shortness of breath when she walks to the bathroom at home. The patient also reports increased urinary frequency from baseline, however she denies suprapubic abdominal pressure/ pain or dysuria. The patient denies fevers/chills, weight loss, fatigue, dizziness, cough, active chest pressure at time of interview, dyspnea at rest at time of interview, abdominal pain, nausea vomiting, diarrhea, constipation, hematochezia, melena. All ROS negative except as above.     # Atypical chest pain   associated with dyspnea on exertion at baseline and now at rest  uses 4 pillows to sleep  r/o CAD and CHF  - no active chest pressure on my examination  - euvolemic on exam / no crackles appreciated  - Trop (-) x1, EKG unremarkable  - Repeat trop 2330 and EKG 2330 and AM  - TTE f/u  - Cardiology consult, might need stress test in am      # Generalized weakness   -associated with increased frequency of urination  - patient received moderna #1 on Wednesday  - no sepsis on admission  - Urinalysis, urine clx  - start Rocephin  - PT consult    #h/o Parkinson's disease  - c/w home medications : carbidopa/levodopa and donepezil  - patient's baseline mental status aax2-3 , at baseline  - uses walker to ambulate  - pt consult f/u    # h/o Hypothyroidism  - c/w synthroid home dose    # h/o HLD  -simvastatin home dose     # DVT PPX: Lovenox  # GI PPX: PPI  # CHG BATH  # Activity: as tolerated  # Diet: DASh/TLC  80 yo F with pmh of Parkinson's disease ( AAx2-3 at baseline / uses a walker), hypothyroidism, HLD presents with 2d history of generalized weakness after receiving the first dose of moderna vaccine 2 days ago. The patient also notes that she had an episode of dyspnea at rest and substernal non-radiating chest pressure which lasted ~ 30 minutes and went away on itself. She is unsure whether there are any alleviating or aggravating factors which led up to her chest pressure and dyspnea. She also notes that she uses 4 pillows to sleep at home. Additional history was obtained from the daughter who notes that her mother has dyspnea on exertion at baseline and usually feels shortness of breath when she walks to the bathroom at home. The patient also reports increased urinary frequency from baseline, however she denies suprapubic abdominal pressure/ pain or dysuria. The patient denies fevers/chills, weight loss, fatigue, dizziness, cough, active chest pressure at time of interview, dyspnea at rest at time of interview, abdominal pain, nausea vomiting, diarrhea, constipation, hematochezia, melena. All ROS negative except as above.     # Atypical chest pain   associated with dyspnea on exertion at baseline and now at rest  uses 4 pillows to sleep  r/o CAD and CHF  - no active chest pressure on my examination  - euvolemic on exam / no crackles appreciated  - Trop (-) x1, EKG unremarkable  - Repeat trop 2330 and EKG 2330 and AM  - TTE f/u  - Cardiology consult, might need stress test in am      # Generalized weakness   -associated with increased frequency of urination  - patient received moderna #1 on Wednesday  - r/o simple cystitis   - no sepsis on admission  - Urinalysis, urine clx  - start Rocephin  - PT consult    #h/o Parkinson's disease  - c/w home medications : carbidopa/levodopa and donepezil  - patient's baseline mental status aax2-3 , at baseline  - uses walker to ambulate  - pt consult f/u    # h/o Hypothyroidism  - c/w synthroid home dose    # h/o HLD  -simvastatin home dose     # h/o microscopic hematura  - patient follows Dr. Beltrán/ Dr. Stone  - has cystoscopy scheduled on Tuesday next week     # DVT PPX: Lovenox  # GI PPX: PPI  # CHG BATH  # Activity: as tolerated  # Diet: DASh/TLC

## 2021-03-06 LAB
ALBUMIN SERPL ELPH-MCNC: 4.3 G/DL — SIGNIFICANT CHANGE UP (ref 3.5–5.2)
ALP SERPL-CCNC: 63 U/L — SIGNIFICANT CHANGE UP (ref 30–115)
ALT FLD-CCNC: <5 U/L — SIGNIFICANT CHANGE UP (ref 0–41)
ANION GAP SERPL CALC-SCNC: 14 MMOL/L — SIGNIFICANT CHANGE UP (ref 7–14)
AST SERPL-CCNC: 17 U/L — SIGNIFICANT CHANGE UP (ref 0–41)
BASOPHILS # BLD AUTO: 0.06 K/UL — SIGNIFICANT CHANGE UP (ref 0–0.2)
BASOPHILS NFR BLD AUTO: 1 % — SIGNIFICANT CHANGE UP (ref 0–1)
BILIRUB SERPL-MCNC: 0.4 MG/DL — SIGNIFICANT CHANGE UP (ref 0.2–1.2)
BUN SERPL-MCNC: 10 MG/DL — SIGNIFICANT CHANGE UP (ref 10–20)
CALCIUM SERPL-MCNC: 9.1 MG/DL — SIGNIFICANT CHANGE UP (ref 8.5–10.1)
CHLORIDE SERPL-SCNC: 104 MMOL/L — SIGNIFICANT CHANGE UP (ref 98–110)
CO2 SERPL-SCNC: 24 MMOL/L — SIGNIFICANT CHANGE UP (ref 17–32)
CREAT SERPL-MCNC: 0.7 MG/DL — SIGNIFICANT CHANGE UP (ref 0.7–1.5)
EOSINOPHIL # BLD AUTO: 0.19 K/UL — SIGNIFICANT CHANGE UP (ref 0–0.7)
EOSINOPHIL NFR BLD AUTO: 3 % — SIGNIFICANT CHANGE UP (ref 0–8)
GLUCOSE SERPL-MCNC: 82 MG/DL — SIGNIFICANT CHANGE UP (ref 70–99)
HCT VFR BLD CALC: 43.9 % — SIGNIFICANT CHANGE UP (ref 37–47)
HGB BLD-MCNC: 14.1 G/DL — SIGNIFICANT CHANGE UP (ref 12–16)
IMM GRANULOCYTES NFR BLD AUTO: 0.2 % — SIGNIFICANT CHANGE UP (ref 0.1–0.3)
LYMPHOCYTES # BLD AUTO: 1.57 K/UL — SIGNIFICANT CHANGE UP (ref 1.2–3.4)
LYMPHOCYTES # BLD AUTO: 25 % — SIGNIFICANT CHANGE UP (ref 20.5–51.1)
MCHC RBC-ENTMCNC: 30.2 PG — SIGNIFICANT CHANGE UP (ref 27–31)
MCHC RBC-ENTMCNC: 32.1 G/DL — SIGNIFICANT CHANGE UP (ref 32–37)
MCV RBC AUTO: 94 FL — SIGNIFICANT CHANGE UP (ref 81–99)
MONOCYTES # BLD AUTO: 0.76 K/UL — HIGH (ref 0.1–0.6)
MONOCYTES NFR BLD AUTO: 12.1 % — HIGH (ref 1.7–9.3)
NEUTROPHILS # BLD AUTO: 3.7 K/UL — SIGNIFICANT CHANGE UP (ref 1.4–6.5)
NEUTROPHILS NFR BLD AUTO: 58.7 % — SIGNIFICANT CHANGE UP (ref 42.2–75.2)
NRBC # BLD: 0 /100 WBCS — SIGNIFICANT CHANGE UP (ref 0–0)
PLATELET # BLD AUTO: 180 K/UL — SIGNIFICANT CHANGE UP (ref 130–400)
POTASSIUM SERPL-MCNC: 4.2 MMOL/L — SIGNIFICANT CHANGE UP (ref 3.5–5)
POTASSIUM SERPL-SCNC: 4.2 MMOL/L — SIGNIFICANT CHANGE UP (ref 3.5–5)
PROT SERPL-MCNC: 6.6 G/DL — SIGNIFICANT CHANGE UP (ref 6–8)
RBC # BLD: 4.67 M/UL — SIGNIFICANT CHANGE UP (ref 4.2–5.4)
RBC # FLD: 12.8 % — SIGNIFICANT CHANGE UP (ref 11.5–14.5)
SODIUM SERPL-SCNC: 142 MMOL/L — SIGNIFICANT CHANGE UP (ref 135–146)
TROPONIN T SERPL-MCNC: <0.01 NG/ML — SIGNIFICANT CHANGE UP
TROPONIN T SERPL-MCNC: <0.01 NG/ML — SIGNIFICANT CHANGE UP
WBC # BLD: 6.29 K/UL — SIGNIFICANT CHANGE UP (ref 4.8–10.8)
WBC # FLD AUTO: 6.29 K/UL — SIGNIFICANT CHANGE UP (ref 4.8–10.8)

## 2021-03-06 PROCEDURE — 99233 SBSQ HOSP IP/OBS HIGH 50: CPT

## 2021-03-06 PROCEDURE — 93010 ELECTROCARDIOGRAM REPORT: CPT

## 2021-03-06 PROCEDURE — 99221 1ST HOSP IP/OBS SF/LOW 40: CPT

## 2021-03-06 PROCEDURE — 99222 1ST HOSP IP/OBS MODERATE 55: CPT

## 2021-03-06 RX ORDER — ACETAMINOPHEN 500 MG
650 TABLET ORAL EVERY 6 HOURS
Refills: 0 | Status: DISCONTINUED | OUTPATIENT
Start: 2021-03-06 | End: 2021-03-08

## 2021-03-06 RX ADMIN — CARBIDOPA AND LEVODOPA 1 TABLET(S): 25; 100 TABLET ORAL at 06:25

## 2021-03-06 RX ADMIN — CARBIDOPA AND LEVODOPA 1 TABLET(S): 25; 100 TABLET ORAL at 12:10

## 2021-03-06 RX ADMIN — PANTOPRAZOLE SODIUM 40 MILLIGRAM(S): 20 TABLET, DELAYED RELEASE ORAL at 06:25

## 2021-03-06 RX ADMIN — Medication 650 MILLIGRAM(S): at 15:06

## 2021-03-06 RX ADMIN — CARBIDOPA AND LEVODOPA 1 TABLET(S): 25; 100 TABLET ORAL at 21:55

## 2021-03-06 RX ADMIN — DONEPEZIL HYDROCHLORIDE 10 MILLIGRAM(S): 10 TABLET, FILM COATED ORAL at 21:56

## 2021-03-06 RX ADMIN — SIMVASTATIN 20 MILLIGRAM(S): 20 TABLET, FILM COATED ORAL at 21:56

## 2021-03-06 RX ADMIN — Medication 88 MICROGRAM(S): at 06:25

## 2021-03-06 RX ADMIN — CARBIDOPA AND LEVODOPA 1 TABLET(S): 25; 100 TABLET ORAL at 23:17

## 2021-03-06 RX ADMIN — ENOXAPARIN SODIUM 40 MILLIGRAM(S): 100 INJECTION SUBCUTANEOUS at 12:10

## 2021-03-06 RX ADMIN — CARBIDOPA AND LEVODOPA 1 TABLET(S): 25; 100 TABLET ORAL at 16:39

## 2021-03-06 RX ADMIN — ESCITALOPRAM OXALATE 20 MILLIGRAM(S): 10 TABLET, FILM COATED ORAL at 12:10

## 2021-03-06 NOTE — CONSULT NOTE ADULT - ASSESSMENT
80 yo F with pmh of Parkinson's disease ( AAx2-3 at baseline / uses a walker), hypothyroidism, HLD, and microscopic hematuria and uterine prolaps (follows Dr. May)  presents with 2d history of generalized weakness after receiving the first dose of moderna vaccine 2 days ago.  Urology called to evaluate Pt. for microscopic hematuria. CT A/P findings  hyperenhancement of the left proximal ureter and mildly dilated renal pelvis, as well as the mid right ureter. No definite obstructing calculus is identified. Findings may reflect ureteritis/pyelitis.   Pt. had been schedule to go for Cystoscopy as an outpatient with Dr. Rebollar on 3/9/21.       Plan:  No acute  intervention needed at this time   UA , Urine cx   Urine cytology  F/U with Dr. Rebollar, cystoscopy as an outpatient.  82 yo F with pmh of Parkinson's disease ( AAx2-3 at baseline / uses a walker), hypothyroidism, HLD, and microscopic hematuria and uterine prolaps (follows Dr. May)  presents with 2d history of generalized weakness after receiving the first dose of moderna vaccine 2 days ago.  Urology called to evaluate Pt. for microscopic hematuria.   Pt. had been schedule to go for Cystoscopy as an outpatient with Dr. Rebollar on 3/9/21.       Plan:  No acute  intervention needed at this time   UA , Urine cx   Urine cytology  F/U with Dr. Rebollar, cystoscopy as an outpatient.

## 2021-03-06 NOTE — CONSULT NOTE ADULT - ASSESSMENT
Assessment:  No chest pain, ACS ruled out  Intermittent dyspnea at rest and exertion    Plan:  2D Echo  check TSH  c/w management of underlying anxiety Assessment:  No chest pain, ACS ruled out  Intermittent dyspnea at rest and exertion associated episodes of anxiety    Plan:  2D Echo  check TSH  c/w management of underlying anxiety   Assessment:  No chest pain, ACS ruled out  Intermittent dyspnea present both at rest and exertion associated episodes of anxiety less likely cardiac in nature    Plan:  2D Echo  check TSH  c/w management of underlying anxiety  given atypical symptoms less likely cardiac in nature and due to poor underlying functional status, pt will not benefit from invasive cardiac work up at this time  will consider medical therapy and antianginal medications for CAD if patient has persistent symptoms suggestive of CAD   Assessment:  No chest pain, ACS ruled out  Intermittent dyspnea present both at rest and exertion associated episodes of anxiety less likely cardiac in nature    Plan:  2D Echo  check TSH  c/w management of underlying anxiety  given atypical symptoms less likely cardiac in nature and due to poor underlying functional status, pt will not benefit from cardiac work up at this time  will consider medical therapy and antianginal medications for CAD if patient has persistent symptoms suggestive of CAD  d/c home form [facility]

## 2021-03-06 NOTE — CONSULT NOTE ADULT - ATTENDING COMMENTS
pt seen and examined, has microscopic hematuria and work up can be done as outpatient including cystoscopy. she will need updated CT urogram which can be done inpatient or outpatient.
agree with above

## 2021-03-06 NOTE — CONSULT NOTE ADULT - SUBJECTIVE AND OBJECTIVE BOX
Date of Admission: 21    CHIEF COMPLAINT: Patient is a 81y old  Female who presents with a chief complaint of generalized weakness , dyspnea (05 Mar 2021 20:20)      HPI:  80 yo F with pmh of Parkinson's disease ( AAx2-3 at baseline / uses a walker), hypothyroidism, HLD, and microscopic hematuria (follows Dr. Beltrán/ [place]) presents with 2d history of generalized weakness after receiving the first dose of moderna vaccine 2 days ago. The patient also notes that she had an episode of dyspnea at rest and substernal non-radiating chest pressure which lasted ~ 30 minutes and went away on itself. She is unsure whether there are any alleviating or aggravating factors which led up to her chest pressure and dyspnea. She also notes that she uses 4 pillows to sleep at home. Additional history was obtained from the daughter who notes that her mother has dyspnea on exertion at baseline and usually feels shortness of breath when she walks to the bathroom at home. The patient also reports increased urinary frequency from baseline, however she denies suprapubic abdominal pressure/ pain or dysuria. The patient denies fevers/chills, weight loss, fatigue, dizziness, cough, active chest pressure at time of interview, dyspnea at rest at time of interview, abdominal pain, nausea vomiting, diarrhea, constipation, hematochezia, melena. All ROS negative except as above.     T(C): 36.3 , HR: 71, BP: 189/79, RR: 18, SpO2: 98%on room air  Labs: unremarkable         (05 Mar 2021 20:20)      PAST MEDICAL & SURGICAL HISTORY:  Back pain    Parkinson disease    Hypothyroid    No significant past surgical history    FAMILY HISTORY:  [x] no pertinent family history of premature cardiovascular disease in first degree relatives.  [x] Parkinson father    SOCIAL HISTORY:    [x] Non-smoker  [x] No alcohol use  [x] No illicit drug use    Allergies  No Known Allergies      REVIEW OF SYSTEMS:  CONSTITUTIONAL: No fever, weight loss, or fatigue  CARDIOLOGY: (+) Chest tightness. No dyspnea of exertion, or syncopal episodes.   RESPIRATORY: (+) Resting SOB and heavy breathing. No cough, wheezing.   NEUROLOGICAL: No weakness, no focal deficits to report.  GI: No BRBPR, no N,V,diarrhea.    PSYCHIATRY: (+) Anxiety.  HEENT: No nasal discharge, no ecchymosis  SKIN: No ecchymosis, no breakdown  MUSCULOSKELETAL: Full range of motion x4.   EXTREM: No leg swelling or erythema.    PHYSICAL EXAM:  T(C): 36.2 (21 @ 00:44), Max: 36.3 (21 @ 15:45)  HR: 70 (21 @ 00:44) (70 - 71)  BP: 153/70 (21 @ 00:44) (153/70 - 189/79)  RR: 18 (21 @ 00:44) (18 - 18)  SpO2: 98% (21 @ 00:44) (98% - 98%)  Wt(kg): --  I&O's Summary      General Appearance: NAD, normal for age and gender. 	  Neck: Normal JVP, no bruit.   Eyes: No xanthomalasia, Extra Ocular muscles intact.   Cardiovascular: Regular rate and rhythm S1 S2, No JVD, No murmurs.  Respiratory: Lungs clear to auscultation. No wheezes, rales or rhonchi.  Psychiatry: Alert and oriented x 3, (+) Anxious  Gastrointestinal:  Soft, Non-tender  Skin/Integumen: No rashes, No ecchymoses, No cyanosis	  Neurologic: Non-focal deficits. Baseline tremor.  Musculoskeletal/ extremities: Move all extremities. No clubbing, cyanosis or edema  Vascular: Peripheral pulses palpable bilaterally    LABS:	 	                        13.9   5.27  )-----------( 176      ( 05 Mar 2021 16:36 )             41.9     -    137  |  101  |  11  ----------------------------<  98  4.4   |  25  |  0.8    Ca    9.4      05 Mar 2021 16:36      CARDIAC MARKERS ( 06 Mar 2021 00:00 )  x     / <0.01 ng/mL / x     / x     / x      CARDIAC MARKERS ( 05 Mar 2021 16:36 )  x     / <0.01 ng/mL / x     / x     / x        TELEMETRY EVENTS: 	    ECG: < from: 12 Lead ECG (21 @ 15:44) >  Diagnosis Line Normal sinus rhythm with sinus arrhythmia  Normal ECG    RADIOLOGY:   OTHER: 	    PREVIOUS DIAGNOSTIC TESTING:    [x] Echocardiogram: < from: Transthoracic Echocardiogram (10.09.19 @ 06:54) >  Summary:   1. Left ventricular ejection fraction, by visual estimation, is 60 to   65%.   2. Normal global left ventricular systolic function.   3. Mildly increased LV wall thickness.   4. Sclerotic aortic valve with normal opening.   5. Mild aortic regurgitation.    [ ] Catheterization:  [ ] Stress Test:  	  	  Home Medications:  carbidopa-levodopa 25 mg-100 mg oral tablet: 1 tab(s) orally 4 times a day at take at 6 am , 12 n ,1600 and at 2000 (05 Mar 2021 20:17)  carbidopa-levodopa 50 mg-200 mg oral tablet, extended release: 1 tab(s) orally once a day (at bedtime)2300 (05 Mar 2021 20:17)  DONEPEZIL    TAB 10M tab(s) orally once a day (at bedtime) (05 Mar 2021 20:17)  escitalopram 20 mg oral tablet: 1 tab(s) orally once a day (05 Mar 2021 20:17)  LEVOTHYROXIN TAB 88MC tab(s) orally once a day (05 Mar 2021 20:17)  SIMVASTATIN  TAB 20M milligram(s) orally once a day (at bedtime) (05 Mar 2021 20:17)    MEDICATIONS  (STANDING):  carbidopa/levodopa  25/100 1 Tablet(s) Oral <User Schedule>  carbidopa/levodopa CR 50/200 1 Tablet(s) Oral <User Schedule>  cefTRIAXone   IVPB      cefTRIAXone   IVPB 1000 milliGRAM(s) IV Intermittent every 24 hours  chlorhexidine 4% Liquid 1 Application(s) Topical <User Schedule>  donepezil 10 milliGRAM(s) Oral at bedtime  enoxaparin Injectable 40 milliGRAM(s) SubCutaneous daily  escitalopram 20 milliGRAM(s) Oral daily  levothyroxine 88 MICROGram(s) Oral daily  pantoprazole    Tablet 40 milliGRAM(s) Oral before breakfast  simvastatin 20 milliGRAM(s) Oral at bedtime    MEDICATIONS  (PRN):        
Patient is a 81y old  Female who presents with a chief complaint of generalized weakness , dyspnea (06 Mar 2021 12:20)      HPI:  82 yo F with pmh of Parkinson's disease ( AAx2-3 at baseline / uses a walker), hypothyroidism, HLD, and microscopic hematuria and uterine prolaps (follows Dr. May)  presents with 2d history of generalized weakness after receiving the first dose of moderna vaccine 2 days ago. The patient also notes that she had an episode of dyspnea at rest and substernal non-radiating chest pressure  Urology called to evaluate Pt. for microscopic hematuria Pt. had been schedule to go for Cystoscopy as an outpatient with Dr. Rebollar on 3/9/21. Pt. denies fever, chills, difficulty with urination, dysuria or gross hematuria.   Pt. appears slightly confused all medical information obtained from Pt's daughter, who is at bedside.     PAST MEDICAL & SURGICAL HISTORY:  Back pain  Parkinson disease  Hypothyroid    No significant past surgical history  Appendectomy   B/L tube ligation       REVIEW OF SYSTEMS:  CONSTITUTIONAL:  no fevers or chills  HEENT: No visual changes  ENDO: No sweating  NECK: No pain or stiffness  MUSCULOSKELETAL: No back pain, no joint pain  RESPIRATORY: No shortness of breath  CARDIOVASCULAR: No chest pain  GASTROINTESTINAL: No abdominal or epigastric pain. No nausea, vomiting,  No diarrhea or constipation.   NEUROLOGICAL: No mental status changes  PSYCH: No depression, no mood changes  SKIN: No itching   as per HPI    MEDICATIONS  (STANDING):  carbidopa/levodopa  25/100 1 Tablet(s) Oral <User Schedule>  carbidopa/levodopa CR 50/200 1 Tablet(s) Oral <User Schedule>  cefTRIAXone   IVPB      cefTRIAXone   IVPB 1000 milliGRAM(s) IV Intermittent every 24 hours  chlorhexidine 4% Liquid 1 Application(s) Topical <User Schedule>  donepezil 10 milliGRAM(s) Oral at bedtime  enoxaparin Injectable 40 milliGRAM(s) SubCutaneous daily  escitalopram 20 milliGRAM(s) Oral daily  levothyroxine 88 MICROGram(s) Oral daily  pantoprazole    Tablet 40 milliGRAM(s) Oral before breakfast  simvastatin 20 milliGRAM(s) Oral at bedtime    MEDICATIONS  (PRN):  acetaminophen   Tablet .. 650 milliGRAM(s) Oral every 6 hours PRN Temp greater or equal to 38C (100.4F), Mild Pain (1 - 3)      Allergies: NKDA       SOCIAL HISTORY: No illicit drug use      FAMILY HISTORY:  No pertinent family hx     Vital Signs Last 24 Hrs  T(C): 35.8 (06 Mar 2021 07:46), Max: 36.2 (06 Mar 2021 00:44)  T(F): 96.4 (06 Mar 2021 07:46), Max: 97.2 (06 Mar 2021 00:44)  HR: 74 (06 Mar 2021 14:31) (70 - 74)  BP: 133/84 (06 Mar 2021 14:31) (127/60 - 184/79)  RR: 18 (06 Mar 2021 14:31) (18 - 18)  SpO2: 100% (06 Mar 2021 14:31) (98% - 100%)    PHYSICAL EXAM:    Constitutional: NAD,  fragile female    HEENT: NC/AT, EOMI  Neck: no pain  Back: No CVA tenderness B/L   Respiratory: No accessory respiratory muscle use  Abd: well healed abdominal scars, Soft, NT/ND   Extremities: no edema  Neurological: A/O x 3  Psychiatric: Normal mood, normal affect  Skin: No rashes    LABS:                        14.1   6.29  )-----------( 180      ( 06 Mar 2021 05:56 )             43.9     03-06    142  |  104  |  10  ----------------------------<  82  4.2   |  24  |  0.7    Ca    9.1      06 Mar 2021 05:56    TPro  6.6  /  Alb  4.3  /  TBili  0.4  /  DBili  x   /  AST  17  /  ALT  <5  /  AlkPhos  63  03-06    Urinalysis (10.11.20 @ 15:31)   pH Urine: 7.0   Glucose Qualitative, Urine: Negative   Blood, Urine: Small   Color: Yellow   Urine Appearance: Clear   Bilirubin: Negative   Ketone - Urine: Trace   Specific Gravity: 1.020   Protein, Urine: Trace   Urobilinogen: <2 mg/dL   Nitrite: Negative   Leukocyte Esterase Concentration: Moderate         Urine Microscopic-Add On (NC) (10.11.20 @ 15:31)   White Blood Cell - Urine: 5 /HPF   Hyaline Casts: 1 /LPF   Red Blood Cell - Urine: 15 /HPF   Bacteria: Negative   Comment - Urine: 15-20 wbc seen per hpf   Epithelial Cells: 1 /HPF       Culture - Urine (10.11.20 @ 15:31)   Specimen Source: .Urine Clean Catch (Midstream)   Culture Results:   <10,000 CFU/mL Normal Urogenital [place]        < from: CT Abdomen and Pelvis w/ IV Cont (06.06.20 @ 14:19) >    EXAM:  CT ABDOMEN AND PELVIS IC            PROCEDURE DATE:  06/06/2020            INTERPRETATION:  CLINICAL STATEMENT: Left flank pain.      TECHNIQUE: Contiguous axial CT images were obtained from the lower chest to the pubic symphysis following administration of 100cc Omnipaque 350 intravenous contrast.  Oral contrast was not administered.  Reformatted images in the coronal and sagittal planes were acquired.    COMPARISON CT: Abdomen and pelvis dated 10/7/2019.    OTHER STUDIES USED FOR CORRELATION: None.       FINDINGS:    LOWER CHEST: Dependent atelectasis is present at the lung bases.    HEPATOBILIARY: The hepatic parenchyma is unremarkable. The gallbladder is present.    SPLEEN: Unremarkable.    PANCREAS: Unremarkable.    ADRENAL GLANDS: Unremarkable.    KIDNEYS: Regional streak artifact from the patient's spinal fixation and right hip hemiarthroplasty obscures complete evaluation of the ureters, however, there is question of hyperenhancement of the left proximal ureter and right mid ureter with mild dilation of the left renal pelvis.    ABDOMINOPELVIC NODES: No definite abdominopelvic lymphadenopathy.    PELVIC ORGANS: Obscured by regional streak artifact right hip hemiarthroplasty.    PERITONEUM/MESENTERY/BOWEL: No evidence of bowel obstruction or pneumoperitoneum.    BONES/SOFT TISSUES: Redemonstrated right hip hemiarthroplasty with cerclage wire. Stable L4-S1 spinal fixation hardware with pedicle rods and screws and intervertebral body fusion. Degenerative changes of the visualized thoracolumbar spine. No acute osseous abnormality. Diffuse osteopenia.    OTHER: Atherosclerotic disease of the aorta and branch vessels.      IMPRESSION:     Limited evaluation secondary to streak artifact from the patient's spinal fixation hardware and right hip hemiarthroplasty.    Suggestion of hyperenhancement of the left proximal ureter and mildly dilated renal pelvis, as well as the mid right ureter. No definite obstructing calculus is identified. Findings may reflect ureteritis/pyelitis. Correlate with urinalysis for infectious etiology.        GLENN SHERMAN M.D., RESIDENT RADIOLOGIST  This document has been electronically signed.  STEW MAURICIO M.D., ATTENDING RADIOLOGIST  This document has been electronically signed. Jun 6 2020  3:14PM    < end of copied text >

## 2021-03-06 NOTE — PROGRESS NOTE ADULT - SUBJECTIVE AND OBJECTIVE BOX
pt seen and examined.     pt is very anxiety        Vital Signs Last 24 Hrs  T(C): 35.8 (06 Mar 2021 07:46), Max: 36.3 (05 Mar 2021 15:45)  T(F): 96.4 (06 Mar 2021 07:46), Max: 97.4 (05 Mar 2021 15:45)  HR: 72 (06 Mar 2021 07:46) (70 - 73)  BP: 127/60 (06 Mar 2021 07:46) (127/60 - 189/79)  RR: 18 (06 Mar 2021 07:46) (18 - 18)  SpO2: 99% (06 Mar 2021 07:46) (98% - 99%)  Daily Height in cm: 160.02 (05 Mar 2021 15:45)      Physical exam:   constitutional NAD, AAOX3, Respiratory  lungs CTA, CVS heart RRR, GI: abdomen Soft NT, ND, BS+, skin: intact  neuro exam non focal.                           14.1   6.29  )-----------( 180      ( 06 Mar 2021 05:56 )             43.9     03-06    142  |  104  |  10  ----------------------------<  82  4.2   |  24  |  0.7    Ca    9.1      06 Mar 2021 05:56    TPro  6.6  /  Alb  4.3  /  TBili  0.4  /  DBili  x   /  AST  17  /  ALT  <5  /  AlkPhos  63  03-06    Urinalysis (10.11.20 @ 15:31)    Glucose Qualitative, Urine: Negative    Blood, Urine: Small    pH Urine: 7.0    Color: Yellow    Urine Appearance: Clear    Bilirubin: Negative    Ketone - Urine: Trace    Specific Gravity: 1.020    Protein, Urine: Trace    Urobilinogen: <2 mg/dL    Nitrite: Negative    Leukocyte Esterase Concentration: Moderate      D-Dimer Assay, Quantitative: 155 ng/mL DDU [0 - 230] (03-05-21 @ 17:15)    COVID-19 PCR: NotDetec (03-05-21 @ 16:25)    CARDIAC MARKERS ( 06 Mar 2021 05:56 )  x     / <0.01 ng/mL / x     / x     / x      CARDIAC MARKERS ( 06 Mar 2021 00:00 )  x     / <0.01 ng/mL / x     / x     / x      CARDIAC MARKERS ( 05 Mar 2021 16:36 )  x     / <0.01 ng/mL / x     / x     / x        MEDICATIONS  (STANDING):  carbidopa/levodopa  25/100 1 Tablet(s) Oral <User Schedule>  carbidopa/levodopa CR 50/200 1 Tablet(s) Oral <User Schedule>  cefTRIAXone   IVPB      cefTRIAXone   IVPB 1000 milliGRAM(s) IV Intermittent every 24 hours  chlorhexidine 4% Liquid 1 Application(s) Topical <User Schedule>  donepezil 10 milliGRAM(s) Oral at bedtime  enoxaparin Injectable 40 milliGRAM(s) SubCutaneous daily  escitalopram 20 milliGRAM(s) Oral daily  levothyroxine 88 MICROGram(s) Oral daily  pantoprazole    Tablet 40 milliGRAM(s) Oral before breakfast  simvastatin 20 milliGRAM(s) Oral at bedtime    PAST MEDICAL & SURGICAL HISTORY:  Back pain    Parkinson disease    Hypothyroid    uses a walker at home, since she is very unsteady    got her first dose of covid dose on Wednesday ( moderna) , next dose is in 3 wks.     a/p  # chest pain, non cardiac, chest discomfort is due to anxiety which she has had for a very long time. cont meds    # Parkinson and dementia, cont meds. needs rehab    # microscopic hematuria, ct as out pt showed abnormality on bladder, was planned for cystoscopy as out pt but pt;s daughter wants to know if this can be done in this admission    # Hypothyroid, check thyroid function test cibt kevothyroxine    DVT prophylaxis.     full code  spoke with daughter Maritza on the phone ( she is an RN in ICU in [facility] )

## 2021-03-06 NOTE — CONSULT NOTE ADULT - CONSULT REASON
Pt. scheduled for cystoscopy with Dr. Rebollar on the 3/9/21 admitted to medicine with generalized weakness after first dose of Moderna Vaccine

## 2021-03-06 NOTE — PHYSICAL THERAPY INITIAL EVALUATION ADULT - GENERAL OBSERVATIONS, REHAB EVAL
PT IE 8:30-9am. Pt supine in NAD. +call bell, +bed alarm, +telemetry. Presented with confusion (A&Ox2 person, place).

## 2021-03-07 LAB
ALBUMIN SERPL ELPH-MCNC: 4.2 G/DL — SIGNIFICANT CHANGE UP (ref 3.5–5.2)
ALP SERPL-CCNC: 61 U/L — SIGNIFICANT CHANGE UP (ref 30–115)
ALT FLD-CCNC: <5 U/L — SIGNIFICANT CHANGE UP (ref 0–41)
ANION GAP SERPL CALC-SCNC: 14 MMOL/L — SIGNIFICANT CHANGE UP (ref 7–14)
APPEARANCE UR: CLEAR — SIGNIFICANT CHANGE UP
AST SERPL-CCNC: 17 U/L — SIGNIFICANT CHANGE UP (ref 0–41)
BACTERIA # UR AUTO: NEGATIVE — SIGNIFICANT CHANGE UP
BASOPHILS # BLD AUTO: 0.06 K/UL — SIGNIFICANT CHANGE UP (ref 0–0.2)
BASOPHILS NFR BLD AUTO: 1 % — SIGNIFICANT CHANGE UP (ref 0–1)
BILIRUB SERPL-MCNC: 0.6 MG/DL — SIGNIFICANT CHANGE UP (ref 0.2–1.2)
BILIRUB UR-MCNC: NEGATIVE — SIGNIFICANT CHANGE UP
BUN SERPL-MCNC: 14 MG/DL — SIGNIFICANT CHANGE UP (ref 10–20)
CALCIUM SERPL-MCNC: 9 MG/DL — SIGNIFICANT CHANGE UP (ref 8.5–10.1)
CHLORIDE SERPL-SCNC: 101 MMOL/L — SIGNIFICANT CHANGE UP (ref 98–110)
CO2 SERPL-SCNC: 24 MMOL/L — SIGNIFICANT CHANGE UP (ref 17–32)
COLOR SPEC: YELLOW — SIGNIFICANT CHANGE UP
CREAT SERPL-MCNC: 0.8 MG/DL — SIGNIFICANT CHANGE UP (ref 0.7–1.5)
DIFF PNL FLD: ABNORMAL
EOSINOPHIL # BLD AUTO: 0.23 K/UL — SIGNIFICANT CHANGE UP (ref 0–0.7)
EOSINOPHIL NFR BLD AUTO: 4 % — SIGNIFICANT CHANGE UP (ref 0–8)
EPI CELLS # UR: 3 /HPF — SIGNIFICANT CHANGE UP (ref 0–5)
GLUCOSE SERPL-MCNC: 77 MG/DL — SIGNIFICANT CHANGE UP (ref 70–99)
GLUCOSE UR QL: NEGATIVE — SIGNIFICANT CHANGE UP
HCT VFR BLD CALC: 43 % — SIGNIFICANT CHANGE UP (ref 37–47)
HGB BLD-MCNC: 13.8 G/DL — SIGNIFICANT CHANGE UP (ref 12–16)
HYALINE CASTS # UR AUTO: 1 /LPF — SIGNIFICANT CHANGE UP (ref 0–7)
IMM GRANULOCYTES NFR BLD AUTO: 0.2 % — SIGNIFICANT CHANGE UP (ref 0.1–0.3)
KETONES UR-MCNC: SIGNIFICANT CHANGE UP
LEUKOCYTE ESTERASE UR-ACNC: NEGATIVE — SIGNIFICANT CHANGE UP
LYMPHOCYTES # BLD AUTO: 1.94 K/UL — SIGNIFICANT CHANGE UP (ref 1.2–3.4)
LYMPHOCYTES # BLD AUTO: 33.6 % — SIGNIFICANT CHANGE UP (ref 20.5–51.1)
MCHC RBC-ENTMCNC: 30.2 PG — SIGNIFICANT CHANGE UP (ref 27–31)
MCHC RBC-ENTMCNC: 32.1 G/DL — SIGNIFICANT CHANGE UP (ref 32–37)
MCV RBC AUTO: 94.1 FL — SIGNIFICANT CHANGE UP (ref 81–99)
MONOCYTES # BLD AUTO: 0.56 K/UL — SIGNIFICANT CHANGE UP (ref 0.1–0.6)
MONOCYTES NFR BLD AUTO: 9.7 % — HIGH (ref 1.7–9.3)
NEUTROPHILS # BLD AUTO: 2.98 K/UL — SIGNIFICANT CHANGE UP (ref 1.4–6.5)
NEUTROPHILS NFR BLD AUTO: 51.5 % — SIGNIFICANT CHANGE UP (ref 42.2–75.2)
NITRITE UR-MCNC: NEGATIVE — SIGNIFICANT CHANGE UP
NRBC # BLD: 0 /100 WBCS — SIGNIFICANT CHANGE UP (ref 0–0)
PH UR: 6 — SIGNIFICANT CHANGE UP (ref 5–8)
PLATELET # BLD AUTO: 188 K/UL — SIGNIFICANT CHANGE UP (ref 130–400)
POTASSIUM SERPL-MCNC: 3.8 MMOL/L — SIGNIFICANT CHANGE UP (ref 3.5–5)
POTASSIUM SERPL-SCNC: 3.8 MMOL/L — SIGNIFICANT CHANGE UP (ref 3.5–5)
PROT SERPL-MCNC: 6.5 G/DL — SIGNIFICANT CHANGE UP (ref 6–8)
PROT UR-MCNC: SIGNIFICANT CHANGE UP
RBC # BLD: 4.57 M/UL — SIGNIFICANT CHANGE UP (ref 4.2–5.4)
RBC # FLD: 12.8 % — SIGNIFICANT CHANGE UP (ref 11.5–14.5)
RBC CASTS # UR COMP ASSIST: 2 /HPF — SIGNIFICANT CHANGE UP (ref 0–4)
SODIUM SERPL-SCNC: 139 MMOL/L — SIGNIFICANT CHANGE UP (ref 135–146)
SP GR SPEC: >1.05 (ref 1.01–1.03)
UROBILINOGEN FLD QL: SIGNIFICANT CHANGE UP
WBC # BLD: 5.78 K/UL — SIGNIFICANT CHANGE UP (ref 4.8–10.8)
WBC # FLD AUTO: 5.78 K/UL — SIGNIFICANT CHANGE UP (ref 4.8–10.8)
WBC UR QL: 2 /HPF — SIGNIFICANT CHANGE UP (ref 0–5)

## 2021-03-07 PROCEDURE — 99232 SBSQ HOSP IP/OBS MODERATE 35: CPT

## 2021-03-07 PROCEDURE — 74178 CT ABD&PLV WO CNTR FLWD CNTR: CPT | Mod: 26

## 2021-03-07 RX ADMIN — PANTOPRAZOLE SODIUM 40 MILLIGRAM(S): 20 TABLET, DELAYED RELEASE ORAL at 05:52

## 2021-03-07 RX ADMIN — Medication 88 MICROGRAM(S): at 05:52

## 2021-03-07 RX ADMIN — CARBIDOPA AND LEVODOPA 1 TABLET(S): 25; 100 TABLET ORAL at 23:25

## 2021-03-07 RX ADMIN — DONEPEZIL HYDROCHLORIDE 10 MILLIGRAM(S): 10 TABLET, FILM COATED ORAL at 22:34

## 2021-03-07 RX ADMIN — CARBIDOPA AND LEVODOPA 1 TABLET(S): 25; 100 TABLET ORAL at 17:02

## 2021-03-07 RX ADMIN — CARBIDOPA AND LEVODOPA 1 TABLET(S): 25; 100 TABLET ORAL at 05:52

## 2021-03-07 RX ADMIN — ESCITALOPRAM OXALATE 20 MILLIGRAM(S): 10 TABLET, FILM COATED ORAL at 11:57

## 2021-03-07 RX ADMIN — CEFTRIAXONE 100 MILLIGRAM(S): 500 INJECTION, POWDER, FOR SOLUTION INTRAMUSCULAR; INTRAVENOUS at 22:33

## 2021-03-07 RX ADMIN — ENOXAPARIN SODIUM 40 MILLIGRAM(S): 100 INJECTION SUBCUTANEOUS at 11:56

## 2021-03-07 RX ADMIN — CARBIDOPA AND LEVODOPA 1 TABLET(S): 25; 100 TABLET ORAL at 11:57

## 2021-03-07 RX ADMIN — CARBIDOPA AND LEVODOPA 1 TABLET(S): 25; 100 TABLET ORAL at 21:42

## 2021-03-07 RX ADMIN — CHLORHEXIDINE GLUCONATE 1 APPLICATION(S): 213 SOLUTION TOPICAL at 05:52

## 2021-03-07 RX ADMIN — SIMVASTATIN 20 MILLIGRAM(S): 20 TABLET, FILM COATED ORAL at 22:34

## 2021-03-07 NOTE — PROGRESS NOTE ADULT - ASSESSMENT
80 yo F with pmh of Parkinson's disease ( AAx2-3 at baseline / uses a walker), hypothyroidism, HLD presents with 2d history of generalized weakness after receiving the first dose of moderna vaccine 2 days PTP. The patient also notes that she had an episode of dyspnea at rest and substernal non-radiating chest pressure which lasted ~ 30 minutes and went away on itself.     # Atypical chest pain, likely related to anxiety  - no ACS  - as per cardio, if symptoms persist they might consider medical management, she is not a candidate for invasive procedures at the moment  - can dc telemetry     # Parkinson's disease with dementia - pt is very weak, but willing to participate with PT.   - daughter wants rehab  - c/w home medications carbidopa/levodopa and donepezil    # h/o Hypothyroidism  - c/w synthroid home dose    # Microscopic hematuria - OP cystoscopy, CT urogram ordered    DC planning to STR. COVID to be send. Spoke to CM    I called daughter, plan of care discussed, updates given.

## 2021-03-07 NOTE — PROVIDER CONTACT NOTE (OTHER) - SITUATION
Pt urinealysis and culture ordered 3/5, not preformed. IV rocephin ordered for now with directions to not give until urine is obtained. Do you want me to wait for results before giving abx?

## 2021-03-07 NOTE — PROGRESS NOTE ADULT - SUBJECTIVE AND OBJECTIVE BOX
RODGER REYNA    Patient is a 81y old  Female who presents with a chief complaint of generalized weakness , dyspnea (06 Mar 2021 17:24)    INTERVAL HPI/OVERNIGHT EVENTS: no events overnight, pt denies chest pain, she feels comfortable.     ROS: All ROS negative     PHYSICAL EXAM:  T(C): 35.6, Max: 36.5 (03-06-21 @ 20:52)  HR: 64 (58 - 70)  BP: 135/63 (135/63 - 174/76)  RR: 18 (18 - 18)  SpO2: --    GENERAL: NAD  PULMONARY/CHEST: No rales, rhonchi, wheezing  CARDIOVASC: Regular rate and rhythm;  GI/ABDOMEN: Soft, Nontender, Nondistended; Bowel sounds present  EXTREMITIES:  2+ Peripheral Pulses, No clubbing, cyanosis, or edema, no deformity. No calf tenderness b/l.  NERVOUS SYSTEM:  Alert & Oriented X3, no focal deficit     Consultant(s) Notes Reviewed by me.     LABS:                        13.8   5.78  )-----------( 188      ( 07 Mar 2021 08:12 )             43.0     03-07    139  |  101  |  14  ----------------------------<  77  3.8   |  24  |  0.8    Ca    9.0      07 Mar 2021 08:12    TPro  6.5  /  Alb  4.2  /  TBili  0.6  /  DBili  x   /  AST  17  /  ALT  <5  /  AlkPhos  61  03-07      RADIOLOGY & ADDITIONAL TESTS:  no new tests      MEDICATIONS  (STANDING):  carbidopa/levodopa  25/100 1 Tablet(s) Oral <User Schedule>  carbidopa/levodopa CR 50/200 1 Tablet(s) Oral <User Schedule>  cefTRIAXone   IVPB      cefTRIAXone   IVPB 1000 milliGRAM(s) IV Intermittent every 24 hours  chlorhexidine 4% Liquid 1 Application(s) Topical <User Schedule>  donepezil 10 milliGRAM(s) Oral at bedtime  enoxaparin Injectable 40 milliGRAM(s) SubCutaneous daily  escitalopram 20 milliGRAM(s) Oral daily  levothyroxine 88 MICROGram(s) Oral daily  pantoprazole    Tablet 40 milliGRAM(s) Oral before breakfast  simvastatin 20 milliGRAM(s) Oral at bedtime    MEDICATIONS  (PRN):  acetaminophen   Tablet .. 650 milliGRAM(s) Oral every 6 hours PRN Temp greater or equal to 38C (100.4F), Mild Pain (1 - 3)

## 2021-03-08 ENCOUNTER — TRANSCRIPTION ENCOUNTER (OUTPATIENT)
Age: 81
End: 2021-03-08

## 2021-03-08 VITALS
HEART RATE: 69 BPM | SYSTOLIC BLOOD PRESSURE: 155 MMHG | RESPIRATION RATE: 19 BRPM | DIASTOLIC BLOOD PRESSURE: 70 MMHG | TEMPERATURE: 97 F

## 2021-03-08 LAB
ALBUMIN SERPL ELPH-MCNC: 4.1 G/DL — SIGNIFICANT CHANGE UP (ref 3.5–5.2)
ALP SERPL-CCNC: 56 U/L — SIGNIFICANT CHANGE UP (ref 30–115)
ALT FLD-CCNC: <5 U/L — SIGNIFICANT CHANGE UP (ref 0–41)
ANION GAP SERPL CALC-SCNC: 11 MMOL/L — SIGNIFICANT CHANGE UP (ref 7–14)
AST SERPL-CCNC: 15 U/L — SIGNIFICANT CHANGE UP (ref 0–41)
BASOPHILS # BLD AUTO: 0.05 K/UL — SIGNIFICANT CHANGE UP (ref 0–0.2)
BASOPHILS NFR BLD AUTO: 0.9 % — SIGNIFICANT CHANGE UP (ref 0–1)
BILIRUB SERPL-MCNC: 0.4 MG/DL — SIGNIFICANT CHANGE UP (ref 0.2–1.2)
BUN SERPL-MCNC: 14 MG/DL — SIGNIFICANT CHANGE UP (ref 10–20)
CALCIUM SERPL-MCNC: 8.8 MG/DL — SIGNIFICANT CHANGE UP (ref 8.5–10.1)
CHLORIDE SERPL-SCNC: 105 MMOL/L — SIGNIFICANT CHANGE UP (ref 98–110)
CO2 SERPL-SCNC: 26 MMOL/L — SIGNIFICANT CHANGE UP (ref 17–32)
CREAT SERPL-MCNC: 0.7 MG/DL — SIGNIFICANT CHANGE UP (ref 0.7–1.5)
EOSINOPHIL # BLD AUTO: 0.21 K/UL — SIGNIFICANT CHANGE UP (ref 0–0.7)
EOSINOPHIL NFR BLD AUTO: 3.8 % — SIGNIFICANT CHANGE UP (ref 0–8)
GLUCOSE SERPL-MCNC: 81 MG/DL — SIGNIFICANT CHANGE UP (ref 70–99)
HCT VFR BLD CALC: 40.3 % — SIGNIFICANT CHANGE UP (ref 37–47)
HGB BLD-MCNC: 13.1 G/DL — SIGNIFICANT CHANGE UP (ref 12–16)
IMM GRANULOCYTES NFR BLD AUTO: 0.2 % — SIGNIFICANT CHANGE UP (ref 0.1–0.3)
LYMPHOCYTES # BLD AUTO: 1.64 K/UL — SIGNIFICANT CHANGE UP (ref 1.2–3.4)
LYMPHOCYTES # BLD AUTO: 29.5 % — SIGNIFICANT CHANGE UP (ref 20.5–51.1)
MAGNESIUM SERPL-MCNC: 2.1 MG/DL — SIGNIFICANT CHANGE UP (ref 1.8–2.4)
MCHC RBC-ENTMCNC: 30.5 PG — SIGNIFICANT CHANGE UP (ref 27–31)
MCHC RBC-ENTMCNC: 32.5 G/DL — SIGNIFICANT CHANGE UP (ref 32–37)
MCV RBC AUTO: 93.7 FL — SIGNIFICANT CHANGE UP (ref 81–99)
MONOCYTES # BLD AUTO: 0.65 K/UL — HIGH (ref 0.1–0.6)
MONOCYTES NFR BLD AUTO: 11.7 % — HIGH (ref 1.7–9.3)
NEUTROPHILS # BLD AUTO: 3 K/UL — SIGNIFICANT CHANGE UP (ref 1.4–6.5)
NEUTROPHILS NFR BLD AUTO: 53.9 % — SIGNIFICANT CHANGE UP (ref 42.2–75.2)
NRBC # BLD: 0 /100 WBCS — SIGNIFICANT CHANGE UP (ref 0–0)
PLATELET # BLD AUTO: 178 K/UL — SIGNIFICANT CHANGE UP (ref 130–400)
POTASSIUM SERPL-MCNC: 3.8 MMOL/L — SIGNIFICANT CHANGE UP (ref 3.5–5)
POTASSIUM SERPL-SCNC: 3.8 MMOL/L — SIGNIFICANT CHANGE UP (ref 3.5–5)
PROT SERPL-MCNC: 6.1 G/DL — SIGNIFICANT CHANGE UP (ref 6–8)
RBC # BLD: 4.3 M/UL — SIGNIFICANT CHANGE UP (ref 4.2–5.4)
RBC # FLD: 12.9 % — SIGNIFICANT CHANGE UP (ref 11.5–14.5)
SARS-COV-2 RNA SPEC QL NAA+PROBE: SIGNIFICANT CHANGE UP
SODIUM SERPL-SCNC: 142 MMOL/L — SIGNIFICANT CHANGE UP (ref 135–146)
WBC # BLD: 5.56 K/UL — SIGNIFICANT CHANGE UP (ref 4.8–10.8)
WBC # FLD AUTO: 5.56 K/UL — SIGNIFICANT CHANGE UP (ref 4.8–10.8)

## 2021-03-08 PROCEDURE — 99239 HOSP IP/OBS DSCHRG MGMT >30: CPT

## 2021-03-08 PROCEDURE — 93306 TTE W/DOPPLER COMPLETE: CPT | Mod: 26

## 2021-03-08 RX ORDER — CARBIDOPA AND LEVODOPA 25; 100 MG/1; MG/1
1 TABLET ORAL
Qty: 0 | Refills: 0 | DISCHARGE
Start: 2021-03-08

## 2021-03-08 RX ADMIN — Medication 88 MICROGRAM(S): at 05:20

## 2021-03-08 RX ADMIN — CARBIDOPA AND LEVODOPA 1 TABLET(S): 25; 100 TABLET ORAL at 05:20

## 2021-03-08 RX ADMIN — CARBIDOPA AND LEVODOPA 1 TABLET(S): 25; 100 TABLET ORAL at 15:21

## 2021-03-08 RX ADMIN — ESCITALOPRAM OXALATE 20 MILLIGRAM(S): 10 TABLET, FILM COATED ORAL at 11:06

## 2021-03-08 RX ADMIN — CARBIDOPA AND LEVODOPA 1 TABLET(S): 25; 100 TABLET ORAL at 11:06

## 2021-03-08 RX ADMIN — ENOXAPARIN SODIUM 40 MILLIGRAM(S): 100 INJECTION SUBCUTANEOUS at 11:05

## 2021-03-08 RX ADMIN — PANTOPRAZOLE SODIUM 40 MILLIGRAM(S): 20 TABLET, DELAYED RELEASE ORAL at 05:20

## 2021-03-08 RX ADMIN — CHLORHEXIDINE GLUCONATE 1 APPLICATION(S): 213 SOLUTION TOPICAL at 05:20

## 2021-03-08 NOTE — DISCHARGE NOTE PROVIDER - NSDCFUSCHEDAPPT_GEN_ALL_CORE_FT
Anticoagulation Summary  As of 2021    INR goal:  2.0-3.0   TTR:  82.4 % (4.6 mo)   INR used for dosin.10 (2021)   Warfarin maintenance plan:  7.5 mg (5 mg x 1.5) every Mon; 5 mg (5 mg x 1) all other days   Weekly warfarin total:  37.5 mg   Plan last modified:  Lorena Whiting, PharmD (2020)   Next INR check:  3/30/2021   Target end date:  Indefinite    Indications    Antiphospholipid antibody syndrome (HCC) [D68.61]  Transient ischemic attack [G45.9]             Anticoagulation Episode Summary     INR check location:      Preferred lab:      Send INR reminders to:      Comments:        Anticoagulation Care Providers     Provider Role Specialty Phone number    Theron CRAVEN M.D. Referring Family Medicine [phone]    [facility]   [phone]        Anticoagulation Patient Findings  Patient Findings     Negatives:  Signs/symptoms of thrombosis, Signs/symptoms of bleeding, Laboratory test error suspected, Change in health, Change in alcohol use, Change in activity, Upcoming invasive procedure, Emergency department visit, Upcoming dental procedure, Missed doses, Extra doses, Change in medications, Change in diet/appetite, Hospital admission, Bruising, Other complaints              History of Present Illness: follow up appointment for chronic anticoagulation with the high risk medication, warfarin for antiphospholipid antibody syndrome.    Pt remains therapeutic today.Continue current dosing regimen.  Follow up in 6 weeks, to reduce the risk of adverse events related to this high risk medication, warfarin.    Stacey Buenrostro, Clinical Pharmacist         RODGER REYNA ; 03/09/2021 ; [facility] ; 03/11/2021 ; [facility] [address] [facility]

## 2021-03-08 NOTE — DISCHARGE NOTE PROVIDER - NSDCMRMEDTOKEN_GEN_ALL_CORE_FT
carbidopa-levodopa 25 mg-100 mg oral tablet: 1 tab(s) orally 4 times a day at take at 6 am , 12 n ,1600 and at 2000  carbidopa-levodopa 50 mg-200 mg oral tablet, extended release: 1 tab(s) orally once a day (at bedtime)2300  DONEPEZIL    TAB 10M tab(s) orally once a day (at bedtime)  escitalopram 20 mg oral tablet: 1 tab(s) orally once a day  LEVOTHYROXIN TAB 88MC tab(s) orally once a day  SIMVASTATIN  TAB 20M milligram(s) orally once a day (at bedtime)   carbidopa-levodopa 25 mg-100 mg oral tablet: 1 tab(s) orally   carbidopa-levodopa 50 mg-200 mg oral tablet, extended release: 1 tab(s) orally   DONEPEZIL    TAB 10M tab(s) orally once a day (at bedtime)  escitalopram 20 mg oral tablet: 1 tab(s) orally once a day  LEVOTHYROXIN TAB 88MC tab(s) orally once a day  SIMVASTATIN  TAB 20M milligram(s) orally once a day (at bedtime)

## 2021-03-08 NOTE — DISCHARGE NOTE PROVIDER - CARE PROVIDER_API CALL
Melina Romeo (MD)  Cardiovascular Disease; Internal Medicine  [address] [place] [address], [address]  [place] [address]  Phone: [phone]  Fax: [phone]  Follow Up Time:     Timothy Arboleda)  Urology  [address] [place] [address], [facility] [address]  [place] [address]  Phone: [phone]  Fax: [phone]  Follow Up Time:    Melina Romeo)  Cardiovascular Disease; Internal Medicine  [address] [place] [address], [address]  [place] [address]  Phone: [phone]  Fax: [phone]  Follow Up Time:     Timothy Arboleda)  Urology  [address] [place] [address], [place] [address]  Phone: [phone]  Fax: [phone]  Follow Up Time:     Jered Rebollar)  Urology  [address] [place] [address], [place] [address]  Phone: [phone]  Fax: [phone]  Follow Up Time:

## 2021-03-08 NOTE — PROGRESS NOTE ADULT - SUBJECTIVE AND OBJECTIVE BOX
RODGER REYNA    Patient is a 81y old  Female who presents with a chief complaint of generalized weakness , dyspnea (08 Mar 2021 10:37)    INTERVAL HPI/OVERNIGHT EVENTS: no events overnight, no new complaints.     ROS: All ROS negative     PHYSICAL EXAM:  T(C): 36.1, Max: 36.1 (03-08-21 @ 16:03)  HR: 69 (69 - 70)  BP: 155/70 (155/70 - 156/78)  RR: 19 (19 - 19)  SpO2: --    GENERAL: NAD  PULMONARY/CHEST: No rales, rhonchi, wheezing  CARDIOVASC: Regular rate and rhythm;  GI/ABDOMEN: Soft, Nontender, Nondistended; Bowel sounds present  EXTREMITIES:  2+ Peripheral Pulses, No clubbing, cyanosis, or edema, no deformity. No calf tenderness b/l.  NERVOUS SYSTEM:  Alert & Oriented X3, no focal deficit     LABS:                        13.1   5.56  )-----------( 178      ( 08 Mar 2021 06:32 )             40.3     03-08    142  |  105  |  14  ----------------------------<  81  3.8   |  26  |  0.7    Ca    8.8      08 Mar 2021 06:32  Mg     2.1     03-08    TPro  6.1  /  Alb  4.1  /  TBili  0.4  /  DBili  x   /  AST  15  /  ALT  <5  /  AlkPhos  56  03-08      RADIOLOGY & ADDITIONAL TESTS:  < from: CT Abdomen and Pelvis Urogram w/wo IV Cont (03.07.21 @ 19:10) >  IMPRESSION:    Limited evaluation of urinary bladder due to streak artifact. Otherwise, unremarkable CT urogram.    Vaginal canal appears distended with fluid density likely extending to the cervix, indeterminate. Correlate with physical exam. Further evaluation with a  pelvic ultrasound is recommended.    < end of copied text >        MEDICATIONS  (STANDING):  carbidopa/levodopa  25/100 1 Tablet(s) Oral <User Schedule>  carbidopa/levodopa CR 50/200 1 Tablet(s) Oral <User Schedule>  cefTRIAXone   IVPB      cefTRIAXone   IVPB 1000 milliGRAM(s) IV Intermittent every 24 hours  chlorhexidine 4% Liquid 1 Application(s) Topical <User Schedule>  donepezil 10 milliGRAM(s) Oral at bedtime  enoxaparin Injectable 40 milliGRAM(s) SubCutaneous daily  escitalopram 20 milliGRAM(s) Oral daily  levothyroxine 88 MICROGram(s) Oral daily  pantoprazole    Tablet 40 milliGRAM(s) Oral before breakfast  simvastatin 20 milliGRAM(s) Oral at bedtime    MEDICATIONS  (PRN):  acetaminophen   Tablet .. 650 milliGRAM(s) Oral every 6 hours PRN Temp greater or equal to 38C (100.4F), Mild Pain (1 - 3)

## 2021-03-08 NOTE — DISCHARGE NOTE PROVIDER - HOSPITAL COURSE
This is an 81 year old female with PMHx of Parkinson's disease ( AAx2-3 at baseline / uses a walker), hypothyroidism, dyslipidemia, and microscopic hematuria (follows Dr. May/ Nkechi) presents with a 2 day history of generalized weakness after receiving the first dose of moderna vaccine 2 days ago. The patient also notes that she had an episode of dyspnea at rest and substernal non-radiating chest pressure which lasted about 30 minutes and went away by itself. She is unsure whether there are any alleviating or aggravating factors which led up to her chest pressure and dyspnea. She also notes that she uses 4 pillows to sleep at home. Additional history was obtained from the daughter who notes that her mother has dyspnea on exertion at baseline and usually feels shortness of breath when she walks to the bathroom at home. The patient also reports increased urinary frequency from baseline, however she denies suprapubic abdominal pressure/ pain or dysuria.     In the ED, she was found to be hypertensive and labs were unremarkable. Chest xray and EKG were negative. She was admitted for further workup.    A transthoracic echo was performed which showed EF of 62%, grade 1 diastolic dysfunction, and no significant valvular abnormalities.   She was found to have microscopic hematuria on urinalysis and a CT urogram was performed. The vaginal canal appeared distended with fluid density likely extending to the cervix, indeterminate, but the rest was unremarkable. She will have a cystoscopy outpatient after discharge.     This is an 81 year old female with PMHx of Parkinson's disease ( AAx2-3 at baseline / uses a walker), hypothyroidism, dyslipidemia, and microscopic hematuria (follows Dr. May/ Nkechi) presents with a 2 day history of generalized weakness after receiving the first dose of moderna vaccine 2 days ago. The patient also notes that she had an episode of dyspnea at rest and substernal non-radiating chest pressure which lasted about 30 minutes and went away by itself. She is unsure whether there are any alleviating or aggravating factors which led up to her chest pressure and dyspnea. She also notes that she uses 4 pillows to sleep at home. Additional history was obtained from the daughter who notes that her mother has dyspnea on exertion at baseline and usually feels shortness of breath when she walks to the bathroom at home. The patient also reports increased urinary frequency from baseline, however she denies suprapubic abdominal pressure/ pain or dysuria.     In the ED, she was found to be hypertensive and labs were unremarkable. Chest xray and EKG were negative. She was admitted for further workup.    A transthoracic echo was performed which showed EF of 62%, grade 1 diastolic dysfunction, and no significant valvular abnormalities.   She was found to have microscopic hematuria on urinalysis and a CT urogram was performed. The vaginal canal appeared distended with fluid density likely extending to the cervix, indeterminate, but the rest was unremarkable. She will have a cystoscopy outpatient (Dr. Rebollar) after discharge.     This is an 81 year old female with PMHx of Parkinson's disease ( AAx2-3 at baseline / uses a walker), hypothyroidism, dyslipidemia, and microscopic hematuria (follows Dr. May/ Nkechi) presents with a 2 day history of generalized weakness after receiving the first dose of moderna vaccine 2 days ago. The patient also notes that she had an episode of dyspnea at rest and substernal non-radiating chest pressure which lasted about 30 minutes and went away by itself. She is unsure whether there are any alleviating or aggravating factors which led up to her chest pressure and dyspnea. She also notes that she uses 4 pillows to sleep at home. Additional history was obtained from the daughter who notes that her mother has dyspnea on exertion at baseline and usually feels shortness of breath when she walks to the bathroom at home. The patient also reports increased urinary frequency from baseline, however she denies suprapubic abdominal pressure/ pain or dysuria.     In the ED, she was found to be hypertensive and labs were unremarkable. Chest xray and EKG were negative. She was admitted for further workup.    A transthoracic echo was performed which showed EF of 62%, grade 1 diastolic dysfunction, and no significant valvular abnormalities.   She was found to have microscopic hematuria on urinalysis and a CT urogram was performed. The vaginal canal appeared distended with fluid density likely extending to the cervix, indeterminate, but the rest was unremarkable. She will have a cystoscopy outpatient (Dr. Rebollar) after discharge tomorrow.

## 2021-03-08 NOTE — PROGRESS NOTE ADULT - REASON FOR ADMISSION
generalized weakness , dyspnea

## 2021-03-08 NOTE — DISCHARGE NOTE NURSING/CASE MANAGEMENT/SOCIAL WORK - PATIENT PORTAL LINK FT
You can access the FollowMyHealth Patient Portal offered by [facility] by registering at the following website: [email or web address]. By joining [facility]’s FollowMyHealth portal, you will also be able to view your health information using other applications (apps) compatible with our system.

## 2021-03-08 NOTE — PROGRESS NOTE ADULT - ASSESSMENT
80 yo F with pmh of Parkinson's disease ( AAx2-3 at baseline / uses a walker), hypothyroidism, HLD presents with 2d history of generalized weakness after receiving the first dose of moderna vaccine 2 days PTP. The patient also notes that she had an episode of dyspnea at rest and substernal non-radiating chest pressure which lasted ~ 30 minutes and went away on itself.     # Atypical chest pain, likely related to anxiety  - no ACS  - as per cardio, if symptoms persist they might consider medical management, she is not a candidate for invasive procedures at the moment    # Parkinson's disease with dementia - pt is very weak, but willing to participate with PT.   - daughter changed her mind and want to take her home with OP PT  - c/w home medications carbidopa/levodopa and donepezil    # h/o Hypothyroidism  - c/w synthroid home dose    # Microscopic hematuria - OP cystoscopy 3/9, CT urogram done    Pt is clinically stable for discharge home today.     DNR/DNI. MOLST form signed.

## 2021-03-08 NOTE — PROGRESS NOTE ADULT - SUBJECTIVE AND OBJECTIVE BOX
Patient is a 81y old  Female who presents with a chief complaint of generalized weakness , dyspnea (07 Mar 2021 16:37)    SUBJECTIVE / INTERVAL HPI: This is an 81 year old female with PMHx of Parkinson's disease ( AAx2-3 at baseline / uses a walker), hypothyroidism, dyslipidemia, and microscopic hematuria (follows Dr. May/ Nkechi) presents with a 2 day history of generalized weakness after receiving the first dose of moderna vaccine 2 days ago. The patient also notes that she had an episode of dyspnea at rest and substernal non-radiating chest pressure which lasted about 30 minutes and went away by itself. She is unsure whether there are any alleviating or aggravating factors which led up to her chest pressure and dyspnea. She also notes that she uses 4 pillows to sleep at home. Additional history was obtained from the daughter who notes that her mother has dyspnea on exertion at baseline and usually feels shortness of breath when she walks to the bathroom at home. The patient also reports increased urinary frequency from baseline, however she denies suprapubic abdominal pressure/ pain or dysuria.     In the ED, she was found to be hypertensive and labs were unremarkable. She was admitted for further workup.    Patient seen and examined at bedside.     VITAL SIGNS:  Vital Signs Last 24 Hrs  T(C): 35.7 (08 Mar 2021 00:00), Max: 36.7 (07 Mar 2021 16:38)  T(F): 96.2 (08 Mar 2021 00:00), Max: 98.1 (07 Mar 2021 16:38)  HR: 70 (08 Mar 2021 00:00) (64 - 70)  BP: 156/78 (08 Mar 2021 00:00) (135/63 - 156/78)  BP(mean): --  RR: 19 (08 Mar 2021 00:00) (18 - 19)  SpO2: --    PHYSICAL EXAM:    General: WDWN  HEENT: NC/AT; PERRL, clear conjunctiva  Neck: supple  Cardiovascular: +S1/S2; RRR  Respiratory: CTA b/l; no W/R/R  Gastrointestinal: soft, NT/ND; +BSx4  Extremities: WWP; 2+ peripheral pulses; no edema   Neurological: AAOx3; no focal deficits    MEDICATIONS:  MEDICATIONS  (STANDING):  carbidopa/levodopa  25/100 1 Tablet(s) Oral <User Schedule>  carbidopa/levodopa CR 50/200 1 Tablet(s) Oral <User Schedule>  cefTRIAXone   IVPB      cefTRIAXone   IVPB 1000 milliGRAM(s) IV Intermittent every 24 hours  chlorhexidine 4% Liquid 1 Application(s) Topical <User Schedule>  donepezil 10 milliGRAM(s) Oral at bedtime  enoxaparin Injectable 40 milliGRAM(s) SubCutaneous daily  escitalopram 20 milliGRAM(s) Oral daily  levothyroxine 88 MICROGram(s) Oral daily  pantoprazole    Tablet 40 milliGRAM(s) Oral before breakfast  simvastatin 20 milliGRAM(s) Oral at bedtime    MEDICATIONS  (PRN):  acetaminophen   Tablet .. 650 milliGRAM(s) Oral every 6 hours PRN Temp greater or equal to 38C (100.4F), Mild Pain (1 - 3)      ALLERGIES:  Allergies    No Known Allergies    Intolerances        LABS:                        13.1   5.56  )-----------( 178      ( 08 Mar 2021 06:32 )             40.3     03-08    142  |  105  |  14  ----------------------------<  81  3.8   |  26  |  0.7    Ca    8.8      08 Mar 2021 06:32  Mg     2.1     03-08    TPro  6.1  /  Alb  4.1  /  TBili  0.4  /  DBili  x   /  AST  15  /  ALT  <5  /  AlkPhos  56  03-08    CAPILLARY BLOOD GLUCOSE    RADIOLOGY & ADDITIONAL TESTS: Reviewed.  Xray Chest 1 View-PORTABLE IMMEDIATE (Xray Chest 1 View-PORTABLE IMMEDIATE .) (03.05.21 @ 17:36)  Impression:    No radiographic evidence of acute cardiopulmonary disease.      HUSSAIN ASTUDILLO MD; Attending Radiologist  This document has been electronically signed. Mar  6 2021 12:06PM Patient is a 81y old  Female who presents with a chief complaint of generalized weakness , dyspnea (07 Mar 2021 16:37)    SUBJECTIVE / INTERVAL HPI: This is an 81 year old female with PMHx of Parkinson's disease ( AAx2-3 at baseline / uses a walker), hypothyroidism, dyslipidemia, and microscopic hematuria (follows Dr. May/ Nkechi) presents with a 2 day history of generalized weakness after receiving the first dose of moderna vaccine 2 days ago. The patient also notes that she had an episode of dyspnea at rest and substernal non-radiating chest pressure which lasted about 30 minutes and went away by itself. She is unsure whether there are any alleviating or aggravating factors which led up to her chest pressure and dyspnea. She also notes that she uses 4 pillows to sleep at home. Additional history was obtained from the daughter who notes that her mother has dyspnea on exertion at baseline and usually feels shortness of breath when she walks to the bathroom at home. The patient also reports increased urinary frequency from baseline, however she denies suprapubic abdominal pressure/ pain or dysuria.     In the ED, she was found to be hypertensive and labs were unremarkable. She was admitted for further workup.    Patient seen and examined at bedside. Today, patient reports feeling well and in good spirits. She denies any further episodes of chest pain or shortness of breath. She also denies lightheadedness, abdominal pain, nausea, vomiting, or diarrhea.     VITAL SIGNS:  Vital Signs Last 24 Hrs  T(C): 35.7 (08 Mar 2021 00:00), Max: 36.7 (07 Mar 2021 16:38)  T(F): 96.2 (08 Mar 2021 00:00), Max: 98.1 (07 Mar 2021 16:38)  HR: 70 (08 Mar 2021 00:00) (64 - 70)  BP: 156/78 (08 Mar 2021 00:00) (135/63 - 156/78)  BP(mean): --  RR: 19 (08 Mar 2021 00:00) (18 - 19)  SpO2: --    PHYSICAL EXAM:    General: Well developed age appropriate female resting comfortably in bed  HEENT: Normocephalic, atraumatic; clear conjunctiva  Neck: supple  Cardiovascular: +S1/S2; regular rate, rhythm, no murmurs, rubs, gallops  Respiratory: Clear to auscultation bilaterally, no wheezes, rhonchi, rales  Gastrointestinal: soft, nontender, nondistended  Extremities: Warm, well perfused; 2+ peripheral pulses; no edema   Neurological: AAOx3; no focal deficits    MEDICATIONS:  MEDICATIONS  (STANDING):  carbidopa/levodopa  25/100 1 Tablet(s) Oral <User Schedule>  carbidopa/levodopa CR 50/200 1 Tablet(s) Oral <User Schedule>  cefTRIAXone   IVPB      cefTRIAXone   IVPB 1000 milliGRAM(s) IV Intermittent every 24 hours  chlorhexidine 4% Liquid 1 Application(s) Topical <User Schedule>  donepezil 10 milliGRAM(s) Oral at bedtime  enoxaparin Injectable 40 milliGRAM(s) SubCutaneous daily  escitalopram 20 milliGRAM(s) Oral daily  levothyroxine 88 MICROGram(s) Oral daily  pantoprazole    Tablet 40 milliGRAM(s) Oral before breakfast  simvastatin 20 milliGRAM(s) Oral at bedtime    MEDICATIONS  (PRN):  acetaminophen   Tablet .. 650 milliGRAM(s) Oral every 6 hours PRN Temp greater or equal to 38C (100.4F), Mild Pain (1 - 3)      ALLERGIES:  Allergies    No Known Allergies    Intolerances    LABS:                        13.1   5.56  )-----------( 178      ( 08 Mar 2021 06:32 )             40.3     03-08    142  |  105  |  14  ----------------------------<  81  3.8   |  26  |  0.7    Ca    8.8      08 Mar 2021 06:32  Mg     2.1     03-08    TPro  6.1  /  Alb  4.1  /  TBili  0.4  /  DBili  x   /  AST  15  /  ALT  <5  /  AlkPhos  56  03-08    CAPILLARY BLOOD GLUCOSE    RADIOLOGY & ADDITIONAL TESTS: Reviewed.  CT Abdomen and Pelvis Urogram w/wo IV Cont (03.07.21 @ 19:10)   IMPRESSION:    Limited evaluation of urinary bladder due to streak artifact. Otherwise, unremarkable CT urogram.    Vaginal canal appears distended with fluid density likely extending to the cervix, indeterminate. Correlate with physical exam. Further evaluation with a  pelvic ultrasound is recommended.    DOM GUTIERREZ MD; Attending Radiologist  This document has been electronically signed. Mar  8 2021  9:13AM    TTE Echo Complete w/o Contrast w/ Doppler (03.08.21 @ 07:14)   Summary:   1. LV Ejection Fraction by Souza's Method with a biplane EF of 62 %.   2. Normal global left ventricular systolic function.   3. Spectral Doppler shows impaired relaxation pattern of left ventricular myocardial filling (Grade I diastolic dysfunction).   4. Mild aortic regurgitation.    PHYSICIAN INTERPRETATION:  Left Ventricle: The left ventricular internal cavity size is normal. Left ventricular wall thickness is normal. Global LV systolic function was normal. Spectral Doppler shows impaired relaxation pattern of left ventricular myocardial filling (Grade I diastolic dysfunction).  Right Ventricle: Normal right ventricular size and function.  Left Atrium: Normal left atrial size.  Right Atrium: Normal right atrial size.  Pericardium: There is no evidence of pericardial effusion.  Mitral Valve: The mitral valve is normal in structure. No evidence of mitral stenosis. No mitral regurgitation.  Tricuspid Valve: The tricuspid valve is normal in structure. Trivial tricuspid regurgitation is visualized.  Aortic Valve: The aortic valve is trileaflet. No evidence of aortic stenosis. Mild aortic regurgitation.  Pulmonic Valve: The pulmonic valve is normal.  Aorta: Aortic root measured at sinotubular junction is normal.  Pulmonary Artery: The main pulmonary artery is normal in size.  Venous: The inferior vena cava was dilated, with respiratory size variation less than 50%.      Xray Chest 1 View-PORTABLE IMMEDIATE (Xray Chest 1 View-PORTABLE IMMEDIATE .) (03.05.21 @ 17:36)  Impression:    No radiographic evidence of acute cardiopulmonary disease.      HUSSAIN ASTUDILLO MD; Attending Radiologist  This document has been electronically signed. Mar  6 2021 12:06PM

## 2021-03-08 NOTE — PROGRESS NOTE ADULT - ASSESSMENT
This is an 81 year old female with PMHx of Parkinson's disease ( AAx2-3 at baseline / uses a walker), hypothyroidism, dyslipidemia, and microscopic hematuria (follows Dr. May/ [place]) presents with a 2 day history of generalized weakness after receiving the first dose of moderna vaccine 2 days ago.    # Atypical chest pain, likely related to anxiety  - No ACS  - As per cardio, if symptoms persist they might consider medical management, she is not a candidate for invasive procedures at the moment    # Parkinson's disease with dementia - pt is very weak, but willing to participate with PT.   - daughter wants rehab  - c/w home medications carbidopa/levodopa and donepezil    # h/o Hypothyroidism  - c/w synthroid home dose    # Microscopic hematuria   - OP cystoscopy, CT urogram ordered        # Misc  - DVT ppx: Lovenox 40mg q24hrs  - GI ppx: Protonix 40mg q24hrs  - Diet: DASH/TLC  - Full code  - DIspo: DC planning to STR. COVID to be send.   This is an 81 year old female with PMHx of Parkinson's disease ( AAx2-3 at baseline / uses a walker), hypothyroidism, dyslipidemia, and microscopic hematuria (follows Dr. May/ [place]) presents with a 2 day history of generalized weakness after receiving the first dose of moderna vaccine 2 days ago.    # Atypical chest pain, likely related to anxiety  - Troponin negative x3  - Chest xray negative  - EKG negative for ischemic changes  - No apparent ACS  - As per cardio, if symptoms persist they might consider medical management, she is not a candidate for invasive procedures at the moment    # Parkinson's disease with dementia   - Patient is very weak, but willing to participate with PT.   - Daughter wants rehab  - Continue with home medications carbidopa/levodopa and donepezil    # H/o Hypothyroidism  - Continue with synthroid home dose    # Microscopic hematuria   - CT urogram- unremarkable except vaginal canal appears distended with fluid density likely extending to the cervix, indeterminate.  - Outpatient cystoscopy    # Misc  - DVT ppx: Lovenox 40mg q24hrs  - GI ppx: Protonix 40mg q24hrs  - Diet: DASH/TLC  - Full code  - DIspo: DC planning to STR. COVID to be send.   This is an 81 year old female with PMHx of Parkinson's disease ( AAx2-3 at baseline / uses a walker), hypothyroidism, dyslipidemia, and microscopic hematuria (follows Dr. May/ [place]) presents with a 2 day history of generalized weakness after receiving the first dose of moderna vaccine 2 days ago.    # Atypical chest pain, likely related to anxiety  - Troponin negative x3  - Chest xray negative  - EKG negative for ischemic changes  - No apparent ACS  - As per cardio, if symptoms persist they might consider medical management, she is not a candidate for invasive procedures at the moment    # Parkinson's disease with dementia   - Patient is very weak, but willing to participate with PT.   - Continue with home medications carbidopa/levodopa and donepezil  - Will follow up with neurology and PT outpatient     # H/o Hypothyroidism  - Continue with synthroid home dose    # Microscopic hematuria   - CT urogram- unremarkable except vaginal canal appears distended with fluid density likely extending to the cervix, indeterminate.  - Outpatient cystoscopy    # Misc  - DVT ppx: Lovenox 40mg q24hrs  - GI ppx: Protonix 40mg q24hrs  - Diet: DASH/TLC  - DNR/DNI (according to daughter)  - DIspo: DC planning to STR. COVID to be send.    Spoke with daughter (Maritza Morales) today [phone]. She confirmed that her mother, Janine does have a DNR/DNI request and that she no longer wishes to go to inpatient rehab. Daughter wants to take her home and pursue rehab as an outpatient. Daughter was updated on her status and all questions were answered.

## 2021-03-08 NOTE — DISCHARGE NOTE PROVIDER - PROVIDER TOKENS
PROVIDER:[TOKEN:[[id number]]],PROVIDER:[TOKEN:[[id number]]] PROVIDER:[TOKEN:[[id number]]],PROVIDER:[TOKEN:[[id number]]],PROVIDER:[TOKEN:[[id number]]]

## 2021-03-08 NOTE — DISCHARGE NOTE PROVIDER - CARE PROVIDERS DIRECT ADDRESSES
[email or web address]

## 2021-03-08 NOTE — DISCHARGE NOTE PROVIDER - NSDCCPCAREPLAN_GEN_ALL_CORE_FT
PRINCIPAL DISCHARGE DIAGNOSIS  Diagnosis: Chest pain  Assessment and Plan of Treatment: You were admitted to the hospital for chest pain. You were found to have negative troponin levels over three tests, an EKG that did not signify any ischemic events, and a cardiac sonogram that was normal. You must follow up with your primary care provider in 1-2 weeks to discuss your symptoms and this hospital stay.      SECONDARY DISCHARGE DIAGNOSES  Diagnosis: Microscopic hematuria  Assessment and Plan of Treatment: You were found to have microscopic hematuria in the hospital. On urinalysis, you did not have any significant bacteria or other abnormalities aside from blood. You had a CT urogram performed which showed a possible fluid collection, but no other significant findings. You are scheduled for an outpatient cystoscopy. You must follow up with your urologist in 1 week to discuss these results.     PRINCIPAL DISCHARGE DIAGNOSIS  Diagnosis: Chest pain  Assessment and Plan of Treatment: You were admitted to the hospital for chest pain. You were found to have negative troponin levels over three tests, an EKG that did not signify any ischemic events, and a cardiac sonogram that was normal. You must follow up with your primary care provider in 1-2 weeks to discuss your symptoms and this hospital stay.      SECONDARY DISCHARGE DIAGNOSES  Diagnosis: Microscopic hematuria  Assessment and Plan of Treatment: You were found to have microscopic hematuria in the hospital. On urinalysis, you did not have any significant bacteria or other abnormalities aside from blood. You had a CT urogram performed which showed a possible fluid collection, but no other significant findings. You are scheduled for an outpatient cystoscopy tomorrow. You must follow up with your urologist tomorow to discuss these results.

## 2021-03-09 ENCOUNTER — APPOINTMENT (OUTPATIENT)
Dept: UROLOGY | Facility: CLINIC | Age: 81
End: 2021-03-09
Payer: MEDICARE

## 2021-03-09 DIAGNOSIS — N95.2 POSTMENOPAUSAL ATROPHIC VAGINITIS: ICD-10-CM

## 2021-03-09 DIAGNOSIS — N32.89 OTHER SPECIFIED DISORDERS OF BLADDER: ICD-10-CM

## 2021-03-09 LAB
CULTURE RESULTS: SIGNIFICANT CHANGE UP
SPECIMEN SOURCE: SIGNIFICANT CHANGE UP

## 2021-03-09 PROCEDURE — 99072 ADDL SUPL MATRL&STAF TM PHE: CPT

## 2021-03-09 PROCEDURE — 52000 CYSTOURETHROSCOPY: CPT

## 2021-03-09 PROCEDURE — 99214 OFFICE O/P EST MOD 30 MIN: CPT | Mod: 25

## 2021-03-09 NOTE — HISTORY OF PRESENT ILLNESS
[FreeTextEntry1] : RODGER REYNA is a 81 year old female h/o Parkinsons, atrophic vaginitis, urethral prolapse and recurrent UTIs, who presents for consultation for microscopic hematuria and bladder wall thickening referred by Dr. May. \par \par Pt is here today for a diagnostic cystoscopy (3/9/2021 No suspicious lesions were detected). Found to have urethral prolapse.\par Pt reports start taking estrogen vaginal\par Denies gross hematuria, dysuria or associated symptoms. \par \par Denies  PMH including previous kidney stones, recurrent UTIs. \par Family History: No  malignancies\par Social History: never smoked.  \par \par 3/2021 CT Urogram images visualized: do not appreciate the bladder wall thickening. \par CT U images 1/2021:  no upper tract filling defects , no renal tumor. Anterior bladder wall thickening asymmetric, difficult to asses secondary to artifact due to hip replacement. \par UA (3/2021) = negative for microscopic \par UCx (3/2021)=ngtd \par \par Consultation requested by Dr. May \par

## 2021-03-09 NOTE — ASSESSMENT
[FreeTextEntry1] : RODGER REYNA is a 81 year old female h/o Parkinsons, atrophic vaginitis, urethral prolapse and recurrent UTIs, who presents for consultation for microscopic hematuria and bladder wall thickening referred by Dr. May. \par \par Cystoscopy normal.  The bladder wall thickening was likely a trabeculation.  Patient's bladder has the appearance of a neurogenic bladder.\par \par Cont fu with  \par cont estrogen vaginal

## 2021-03-09 NOTE — ADDENDUM
[FreeTextEntry1] : Patient's note was transcribed with the assistance of a medical scribe under the supervision of Dr. Rebollar.\par I, Dr. Rebollar, have reviewed the patient's chart and agree that it aligns with my medical decisions.\par Resee Ludwig, our scribe, also served as a chaperone for physical examination purposes.\par \par \par

## 2021-03-11 ENCOUNTER — APPOINTMENT (OUTPATIENT)
Dept: UROGYNECOLOGY | Facility: CLINIC | Age: 81
End: 2021-03-11

## 2021-03-16 DIAGNOSIS — R07.9 CHEST PAIN, UNSPECIFIED: ICD-10-CM

## 2021-03-16 DIAGNOSIS — R53.1 WEAKNESS: ICD-10-CM

## 2021-03-16 DIAGNOSIS — I10 ESSENTIAL (PRIMARY) HYPERTENSION: ICD-10-CM

## 2021-03-16 DIAGNOSIS — G20 PARKINSON'S DISEASE: ICD-10-CM

## 2021-03-16 DIAGNOSIS — E03.9 HYPOTHYROIDISM, UNSPECIFIED: ICD-10-CM

## 2021-03-16 DIAGNOSIS — F02.80 DEMENTIA IN OTHER DISEASES CLASSIFIED ELSEWHERE, UNSPECIFIED SEVERITY, WITHOUT BEHAVIORAL DISTURBANCE, PSYCHOTIC DISTURBANCE, MOOD DISTURBANCE, AND ANXIETY: ICD-10-CM

## 2021-03-16 DIAGNOSIS — Z66 DO NOT RESUSCITATE: ICD-10-CM

## 2021-03-16 DIAGNOSIS — R31.21 ASYMPTOMATIC MICROSCOPIC HEMATURIA: ICD-10-CM

## 2021-03-16 DIAGNOSIS — F41.9 ANXIETY DISORDER, UNSPECIFIED: ICD-10-CM

## 2021-05-19 PROBLEM — G20 PARKINSON'S DISEASE: Chronic | Status: ACTIVE | Noted: 2021-03-05

## 2021-05-19 RX ORDER — SIMVASTATIN 20 MG/1
20 TABLET, FILM COATED ORAL
Qty: 90 | Refills: 3 | Status: ACTIVE | COMMUNITY
Start: 2019-12-12 | End: 1900-01-01

## 2021-07-08 ENCOUNTER — RX RENEWAL (OUTPATIENT)
Age: 81
End: 2021-07-08

## 2021-07-08 ENCOUNTER — APPOINTMENT (OUTPATIENT)
Dept: CARDIOLOGY | Facility: CLINIC | Age: 81
End: 2021-07-08
Payer: MEDICARE

## 2021-07-08 VITALS
BODY MASS INDEX: 20.02 KG/M2 | TEMPERATURE: 96.5 F | DIASTOLIC BLOOD PRESSURE: 70 MMHG | SYSTOLIC BLOOD PRESSURE: 126 MMHG | HEIGHT: 63 IN | WEIGHT: 113 LBS | HEART RATE: 73 BPM

## 2021-07-08 DIAGNOSIS — I10 ESSENTIAL (PRIMARY) HYPERTENSION: ICD-10-CM

## 2021-07-08 DIAGNOSIS — E03.9 HYPOTHYROIDISM, UNSPECIFIED: ICD-10-CM

## 2021-07-08 PROCEDURE — 93000 ELECTROCARDIOGRAM COMPLETE: CPT

## 2021-07-08 PROCEDURE — 99072 ADDL SUPL MATRL&STAF TM PHE: CPT

## 2021-07-08 PROCEDURE — 99213 OFFICE O/P EST LOW 20 MIN: CPT

## 2021-07-08 RX ORDER — ESCITALOPRAM OXALATE 20 MG/1
20 TABLET ORAL DAILY
Qty: 90 | Refills: 2 | Status: ACTIVE | COMMUNITY
Start: 2021-07-08 | End: 1900-01-01

## 2021-07-08 RX ORDER — CIPROFLOXACIN HYDROCHLORIDE 500 MG/1
500 TABLET, FILM COATED ORAL
Qty: 2 | Refills: 0 | Status: DISCONTINUED | COMMUNITY
Start: 2021-03-09 | End: 2021-07-08

## 2021-07-08 RX ORDER — SIMVASTATIN 20 MG/1
20 TABLET, FILM COATED ORAL
Qty: 90 | Refills: 2 | Status: ACTIVE | COMMUNITY
Start: 2021-07-08 | End: 1900-01-01

## 2021-07-08 RX ORDER — LEVOTHYROXINE SODIUM 0.09 MG/1
88 TABLET ORAL DAILY
Qty: 90 | Refills: 2 | Status: ACTIVE | COMMUNITY
Start: 2021-07-08 | End: 1900-01-01

## 2021-07-08 RX ORDER — ESCITALOPRAM OXALATE 20 MG/1
20 TABLET ORAL DAILY
Qty: 90 | Refills: 1 | Status: ACTIVE | COMMUNITY
Start: 2019-12-12 | End: 1900-01-01

## 2021-07-08 NOTE — HISTORY OF PRESENT ILLNESS
[FreeTextEntry1] : pt is feeling week\par pt uses walker all the time\par  no anginalsx\par  not able to eat well no swllowing problem\par  meds reviewed\par

## 2021-07-08 NOTE — ASSESSMENT
[FreeTextEntry1] : ritu richardson done on 1/2 standrad no change\par  htn well controlled\par  meds renewed\par  will order blood work\par  advised to eat more protein\par

## 2021-07-08 NOTE — REASON FOR VISIT
[Symptom and Test Evaluation] : symptom and test evaluation [Arrhythmia/ECG Abnorrmalities] : arrhythmia/ECG abnormalities [Hyperlipidemia] : hyperlipidemia

## 2021-07-12 LAB
ALBUMIN SERPL ELPH-MCNC: 4.6 G/DL
ALP BLD-CCNC: 74 U/L
ALT SERPL-CCNC: 7 U/L
ANION GAP SERPL CALC-SCNC: 15 MMOL/L
AST SERPL-CCNC: 16 U/L
BASOPHILS # BLD AUTO: 0.05 K/UL
BASOPHILS NFR BLD AUTO: 0.8 %
BILIRUB SERPL-MCNC: 0.5 MG/DL
BUN SERPL-MCNC: 16 MG/DL
CALCIUM SERPL-MCNC: 9.7 MG/DL
CHLORIDE SERPL-SCNC: 102 MMOL/L
CHOLEST SERPL-MCNC: 160 MG/DL
CO2 SERPL-SCNC: 24 MMOL/L
CREAT SERPL-MCNC: 0.8 MG/DL
EOSINOPHIL # BLD AUTO: 0.15 K/UL
EOSINOPHIL NFR BLD AUTO: 2.4 %
GLUCOSE SERPL-MCNC: 86 MG/DL
HCT VFR BLD CALC: 44.4 %
HDLC SERPL-MCNC: 71 MG/DL
HGB BLD-MCNC: 14.3 G/DL
IMM GRANULOCYTES NFR BLD AUTO: 0.3 %
LDLC SERPL CALC-MCNC: 76 MG/DL
LYMPHOCYTES # BLD AUTO: 1.35 K/UL
LYMPHOCYTES NFR BLD AUTO: 21.7 %
MAN DIFF?: NORMAL
MCHC RBC-ENTMCNC: 30.8 PG
MCHC RBC-ENTMCNC: 32.2 G/DL
MCV RBC AUTO: 95.5 FL
MONOCYTES # BLD AUTO: 0.56 K/UL
MONOCYTES NFR BLD AUTO: 9 %
NEUTROPHILS # BLD AUTO: 4.08 K/UL
NEUTROPHILS NFR BLD AUTO: 65.8 %
NONHDLC SERPL-MCNC: 89 MG/DL
PLATELET # BLD AUTO: 189 K/UL
POTASSIUM SERPL-SCNC: 5 MMOL/L
PROT SERPL-MCNC: 7.2 G/DL
RBC # BLD: 4.65 M/UL
RBC # FLD: 13.2 %
SODIUM SERPL-SCNC: 141 MMOL/L
T4 SERPL-MCNC: 9.1 UG/DL
TRIGL SERPL-MCNC: 75 MG/DL
TSH SERPL-ACNC: 1.09 UIU/ML
WBC # FLD AUTO: 6.21 K/UL

## 2021-07-12 NOTE — ED ADULT NURSE NOTE - NSFALLRSKASSESSDT_ED_ALL_ED
Spoke with he c/o urinary pressure he had some blood in his semen.He had this in the past with a UTI. He has an appt with Dr French 7/15/21. I offered [facility] today he says he will keep appt he will cancel it if he decides to go to [facility].   05-Mar-2021 16:12

## 2021-08-13 ENCOUNTER — APPOINTMENT (OUTPATIENT)
Dept: CARDIOLOGY | Facility: CLINIC | Age: 81
End: 2021-08-13

## 2021-08-19 ENCOUNTER — RX RENEWAL (OUTPATIENT)
Age: 81
End: 2021-08-19

## 2021-08-30 ENCOUNTER — RX RENEWAL (OUTPATIENT)
Age: 81
End: 2021-08-30

## 2021-10-28 ENCOUNTER — APPOINTMENT (OUTPATIENT)
Dept: NEUROLOGY | Facility: CLINIC | Age: 81
End: 2021-10-28
Payer: MEDICARE

## 2021-10-28 VITALS
HEIGHT: 63 IN | SYSTOLIC BLOOD PRESSURE: 112 MMHG | HEART RATE: 69 BPM | DIASTOLIC BLOOD PRESSURE: 70 MMHG | WEIGHT: 114 LBS | TEMPERATURE: 97.9 F | OXYGEN SATURATION: 99 % | BODY MASS INDEX: 20.2 KG/M2

## 2021-10-28 PROCEDURE — 99214 OFFICE O/P EST MOD 30 MIN: CPT

## 2021-10-28 NOTE — PHYSICAL EXAM
[FreeTextEntry1] : A/A/Ox3\par follows commands\par good attention\par does not remember what she watched on the news\par good vocal  strength\par decreased ROM of the neck\par + atrophy of left thenar and ist IOM \par no tremor \par hyomimic\par able to stand up without using the hands\par ++ bradykinesia\par + mild posturing left > right\par + hesitant shuffling gait\par + decrease arm swing and left side cogwheeling

## 2021-10-28 NOTE — HISTORY OF PRESENT ILLNESS
[FreeTextEntry1] : Janine is here for the f/u accompanied by her daughter. \par As I walk in i find her having a labored breathing and SOB as she talks . She has to take a breath in the middle of long sentences.\par she is gradually settles down. In the past I had raised the SOB with them. They saw Dr Romeo their cardiologist .\par Otherwise she is at her baseline.  Having her LBP and also the PD.\par They were seen in our movement D/O /PD clinic\par according to the daughter the main issue that is limiting her function is her back.\par frequently she gets a sharp pain across the lower back without radiating to the legs.\par She cannot stand for too long.\par she is going for PT twice a week\par her appetite is good. Her mood is described as occasionally sad/down/blue\par Her sleep is often intrupted however does not have excessive daytime sleep\par No  significant dizziness/lightheadedness\par No issues with the swallowing\katharina often gets out of the bed during the night time easily but sometime needs daughter's help/.\par able to feed herself going to the bathroom. needs help for the shower. has bladder incontinence and seeing  for that reason\par walks with the walker for both her balance and spine issues\par No report of dyskanesia\par

## 2021-11-17 ENCOUNTER — RX RENEWAL (OUTPATIENT)
Age: 81
End: 2021-11-17

## 2021-12-20 ENCOUNTER — APPOINTMENT (OUTPATIENT)
Dept: UROGYNECOLOGY | Facility: CLINIC | Age: 81
End: 2021-12-20
Payer: MEDICARE

## 2021-12-20 VITALS
SYSTOLIC BLOOD PRESSURE: 132 MMHG | DIASTOLIC BLOOD PRESSURE: 69 MMHG | HEART RATE: 70 BPM | BODY MASS INDEX: 20.38 KG/M2 | HEIGHT: 63 IN | WEIGHT: 115 LBS

## 2021-12-20 DIAGNOSIS — N36.8 OTHER SPECIFIED DISORDERS OF URETHRA: ICD-10-CM

## 2021-12-20 DIAGNOSIS — R31.29 OTHER MICROSCOPIC HEMATURIA: ICD-10-CM

## 2021-12-20 PROCEDURE — 99214 OFFICE O/P EST MOD 30 MIN: CPT

## 2021-12-20 NOTE — HISTORY OF PRESENT ILLNESS
[FreeTextEntry1] : Patient is here for 12 months med check for urethral prolapse. Pt is here with her daughter. \par Last seen on 12/10/2020 as a new pt with urethral bulge. \par \par History of Parkinsons\par \par 10/11/20: UCX: >10 K urogenital adam\par 6/6/20 UCX: e coli R amp\par \par Records reviewed:\par 12/30/2020: ultrasound: uterus 5.2x4.7cm, EE 4mm, fluid in cavity, normal ROV, LOV not visualized, performed by Dr Shaffer\par \par s/p tubal, s/p appy\par hx of back surgery\par no prolapse\par \par Estrace cream\par \par BUN/Cr: normal\par CT scan: 1/25/21: thickening along anterior bladder wall. Pt was referred to Dr Rebollar for further evaluation\par \par Per Dr Rebollar: \par 3/2021 CT Urogram images visualized: do not appreciate the bladder wall thickening. \par CT U images 1/2021: no upper tract filling defects , no renal tumor. Anterior bladder wall thickening asymmetric, difficult to asses secondary to artifact due to hip replacement. \par UA (3/2021) = negative for microscopic \par UCx (3/2021)=ngtd \par Cysto with Dr Rebollar, 3/9/21: normal. The bladder wall thickening was likely a trabeculation. Patient's bladder has the appearance of a neurogenic bladder.\par \par Today, patient states she is happy with using estrace cream and is noticing improvement. Denies side effects. Patient does not feel she has an infection. Daughter states that occasionally she notices that the urine is blood tinged in the toilet. She doesn't always use the cream 3 x a week, sometimes they forget. Very happy that she has not had any UTIs in the last year. Saw Dr Mack for abnormal findings on CT scan had cystoscopy but was told that everything is normal and is age related. \par \par \par

## 2021-12-20 NOTE — COUNSELING
[FreeTextEntry1] : If you feel like you have an infection it is important for you to call our office and we will arrange testing of your urine.\par \par Please continue to apply a pea size amount to the opening of the vagina three times a week. \par \par Please call my office if you have any issues with the cost or side effects of the medication. \par \par Schedule a 12 months follow up med check appointment.\par

## 2021-12-20 NOTE — DISCUSSION/SUMMARY
[FreeTextEntry1] : Urethral Prolapse\par Cont estrogen cream, advised to be sure to do 3 x a week as directed\par \par Atrophic Vaginitis\par Cont estrace, refills provided\par \par Microscopic Hematuria\par Work up completed and negative

## 2021-12-28 ENCOUNTER — APPOINTMENT (OUTPATIENT)
Dept: CARDIOLOGY | Facility: CLINIC | Age: 81
End: 2021-12-28

## 2022-02-14 ENCOUNTER — RX RENEWAL (OUTPATIENT)
Age: 82
End: 2022-02-14

## 2022-03-30 ENCOUNTER — RX RENEWAL (OUTPATIENT)
Age: 82
End: 2022-03-30

## 2022-05-23 ENCOUNTER — RX RENEWAL (OUTPATIENT)
Age: 82
End: 2022-05-23

## 2022-05-27 ENCOUNTER — APPOINTMENT (OUTPATIENT)
Dept: NEUROLOGY | Facility: CLINIC | Age: 82
End: 2022-05-27

## 2022-06-22 RX ORDER — CARBIDOPA AND LEVODOPA 50; 200 MG/1; MG/1
50-200 TABLET, EXTENDED RELEASE ORAL
Qty: 180 | Refills: 3 | Status: ACTIVE | COMMUNITY
Start: 1900-01-01 | End: 1900-01-01

## 2022-07-01 ENCOUNTER — RX RENEWAL (OUTPATIENT)
Age: 82
End: 2022-07-01

## 2022-08-10 ENCOUNTER — APPOINTMENT (OUTPATIENT)
Dept: NEUROLOGY | Facility: CLINIC | Age: 82
End: 2022-08-10

## 2022-08-10 VITALS
HEIGHT: 63 IN | BODY MASS INDEX: 20.2 KG/M2 | HEART RATE: 67 BPM | SYSTOLIC BLOOD PRESSURE: 110 MMHG | WEIGHT: 114 LBS | OXYGEN SATURATION: 99 % | DIASTOLIC BLOOD PRESSURE: 70 MMHG | TEMPERATURE: 97.6 F

## 2022-08-10 DIAGNOSIS — M54.16 RADICULOPATHY, LUMBAR REGION: ICD-10-CM

## 2022-08-10 PROCEDURE — 99214 OFFICE O/P EST MOD 30 MIN: CPT

## 2022-08-11 PROBLEM — M54.16 LUMBAR RADICULOPATHY: Status: ACTIVE | Noted: 2019-08-15

## 2022-08-11 NOTE — HISTORY OF PRESENT ILLNESS
[FreeTextEntry1] : Janine is here with her daughter for the F/U\par According to both she is very much now at her baseline now after a fall and a period of decline \par She lost her balance and fell and had a period of very severe back pain.\par For the fall she was admitted to [facility] and  had X-Rays and pain managements and from there was transferred to the rehab and now back to home.\par She is still having 2 times/week of PT\par She is ambulating with the walker around the house.\par No significant tremor. She does have constipation. No issues with the swallowing but is not eating well /? decreased appetite\par No lightheadedness. While in the [facility] and having pain, her BP was high and was started on med and later on having blood pressur in the range of 110 they had stopped the med.\par She says her mood is good. The daughter believes periods of not being sharp and /or being forgetful is increasing\par \par She can have slight urinary accidents but she is aware of it and in control to the best that she can\par The daughter denies seeing significant dyskanesia

## 2022-08-11 NOTE — ASSESSMENT
[FreeTextEntry1] : PD\par Lumbar mechanical and radicular disease\par \par \par Plan\par continue current meds\par PT/OT\par PRN extra 25/100 Sinemet during the night if needed

## 2022-08-11 NOTE — PHYSICAL EXAM
[FreeTextEntry1] : A/A/Ox 3\par follows 4 step commands\par good facial expression\par good mood\par good attention\par No tremor\par no drift\par slight bradykinesia\par + mild rigidity

## 2022-08-28 ENCOUNTER — RX RENEWAL (OUTPATIENT)
Age: 82
End: 2022-08-28

## 2022-10-03 ENCOUNTER — RX RENEWAL (OUTPATIENT)
Age: 82
End: 2022-10-03

## 2022-11-21 ENCOUNTER — RX RENEWAL (OUTPATIENT)
Age: 82
End: 2022-11-21

## 2022-11-21 RX ORDER — ESTRADIOL 0.1 MG/G
0.1 CREAM VAGINAL
Qty: 42.5 | Refills: 3 | Status: ACTIVE | COMMUNITY
Start: 2020-12-10 | End: 1900-01-01

## 2022-11-25 ENCOUNTER — RX RENEWAL (OUTPATIENT)
Age: 82
End: 2022-11-25

## 2022-12-20 ENCOUNTER — NON-APPOINTMENT (OUTPATIENT)
Age: 82
End: 2022-12-20

## 2022-12-20 ENCOUNTER — APPOINTMENT (OUTPATIENT)
Dept: NEUROLOGY | Facility: CLINIC | Age: 82
End: 2022-12-20

## 2022-12-20 VITALS
SYSTOLIC BLOOD PRESSURE: 105 MMHG | WEIGHT: 108.13 LBS | HEART RATE: 59 BPM | OXYGEN SATURATION: 97 % | HEIGHT: 63 IN | DIASTOLIC BLOOD PRESSURE: 64 MMHG | BODY MASS INDEX: 19.16 KG/M2 | TEMPERATURE: 96.3 F

## 2022-12-20 DIAGNOSIS — G20 PARKINSON'S DISEASE: ICD-10-CM

## 2022-12-20 PROCEDURE — 99214 OFFICE O/P EST MOD 30 MIN: CPT

## 2022-12-20 NOTE — ED ADULT NURSE NOTE - NSFALLRSKINDICTYPE_ED_ALL_ED
Impaired Gait Advancement Flap (Double) Text: Given the location of the defect and the proximity to free margins a double advancement flap was deemed most appropriate.  Using a sterile surgical marker, the appropriate advancement flaps were drawn incorporating the defect and placing the expected incisions within the relaxed skin tension lines where possible.    The area thus outlined was incised deep to adipose tissue with a #15c scalpel blade.  The skin margins were undermined to an appropriate distance in all directions utilizing iris scissors.

## 2022-12-26 NOTE — ASSESSMENT
[FreeTextEntry1] : PD\par cognitive decline\par spine disease\par mood D/O\par \par \par plan\par MRI\par EEG\par Start small dose of Seroquel ( 12.5 hs)

## 2022-12-26 NOTE — PHYSICAL EXAM
[FreeTextEntry1] : A/A/Ox person and place.Recognizes me\par she is well dressed and well groomed and appropriate\par follows some of the two - three steps commands\par Slight difficulty with left and right orientation\par slightly clumsy with the executive behavior\par 1/3 recall\par Does not remember the name of the [employer] program that she is watching\par Does not remember grand child;s name.\par + rigidity\par + cogwheeling\par no significant tremor\par decreased fine motor\par decreased ROM of the neck\par slow , wide and mildly shuffling gait\par

## 2022-12-26 NOTE — HISTORY OF PRESENT ILLNESS
[FreeTextEntry1] : Janine is here with her daughter. this is an earlier than scheduled F/U.\par The daughter wanted to have a visit. The main concern was the change in Janine's behavior .\par She has periods of being confused and acting unusual at times admixed with visual hallucination.\par She could be fluctuating and at times be calm and making total sense.\par On occasions as it is shown to me ,recorded on the phone. she could be quite aggressive and agitated.\par No report of seizure ( motor or head and eye deviation)\par No report of fever and infection\par The daughter also believes even on her good days Janine is showing signs of cognitive and memory decline\par No falls \par No change in the appetite\par She lost few more pounds\par

## 2022-12-30 ENCOUNTER — RX RENEWAL (OUTPATIENT)
Age: 82
End: 2022-12-30

## 2022-12-30 RX ORDER — DONEPEZIL HYDROCHLORIDE 10 MG/1
10 TABLET, ORALLY DISINTEGRATING ORAL DAILY
Qty: 90 | Refills: 0 | Status: ACTIVE | COMMUNITY
Start: 2021-03-03 | End: 1900-01-01

## 2023-01-03 ENCOUNTER — INPATIENT (INPATIENT)
Facility: HOSPITAL | Age: 83
LOS: 5 days | Discharge: HOME HEALTH | End: 2023-01-09
Attending: INTERNAL MEDICINE | Admitting: INTERNAL MEDICINE
Payer: MEDICARE

## 2023-01-03 VITALS
TEMPERATURE: 101 F | WEIGHT: 104.94 LBS | OXYGEN SATURATION: 94 % | SYSTOLIC BLOOD PRESSURE: 139 MMHG | RESPIRATION RATE: 16 BRPM | DIASTOLIC BLOOD PRESSURE: 70 MMHG | HEART RATE: 88 BPM

## 2023-01-03 PROCEDURE — 99285 EMERGENCY DEPT VISIT HI MDM: CPT

## 2023-01-03 NOTE — ED ADULT TRIAGE NOTE - CHIEF COMPLAINT QUOTE
pt p/w progressing altered mental status over the course of 2 months. pt daughter stated it has been worse today. pt has hx of parkinson's and prolapsed urethra and is prone to UTIs

## 2023-01-04 ENCOUNTER — APPOINTMENT (OUTPATIENT)
Dept: NEUROLOGY | Facility: CLINIC | Age: 83
End: 2023-01-04

## 2023-01-04 LAB
ALBUMIN SERPL ELPH-MCNC: 4.8 G/DL — SIGNIFICANT CHANGE UP (ref 3.5–5.2)
ALP SERPL-CCNC: 64 U/L — SIGNIFICANT CHANGE UP (ref 30–115)
ALT FLD-CCNC: <5 U/L — SIGNIFICANT CHANGE UP (ref 0–41)
ANION GAP SERPL CALC-SCNC: 13 MMOL/L — SIGNIFICANT CHANGE UP (ref 7–14)
APPEARANCE UR: CLEAR — SIGNIFICANT CHANGE UP
AST SERPL-CCNC: 18 U/L — SIGNIFICANT CHANGE UP (ref 0–41)
BACTERIA # UR AUTO: NEGATIVE — SIGNIFICANT CHANGE UP
BASOPHILS # BLD AUTO: 0.06 K/UL — SIGNIFICANT CHANGE UP (ref 0–0.2)
BASOPHILS NFR BLD AUTO: 0.7 % — SIGNIFICANT CHANGE UP (ref 0–1)
BILIRUB SERPL-MCNC: 0.5 MG/DL — SIGNIFICANT CHANGE UP (ref 0.2–1.2)
BILIRUB UR-MCNC: NEGATIVE — SIGNIFICANT CHANGE UP
BUN SERPL-MCNC: 20 MG/DL — SIGNIFICANT CHANGE UP (ref 10–20)
CALCIUM SERPL-MCNC: 9.4 MG/DL — SIGNIFICANT CHANGE UP (ref 8.4–10.5)
CHLORIDE SERPL-SCNC: 103 MMOL/L — SIGNIFICANT CHANGE UP (ref 98–110)
CK SERPL-CCNC: 107 U/L — SIGNIFICANT CHANGE UP (ref 0–225)
CO2 SERPL-SCNC: 26 MMOL/L — SIGNIFICANT CHANGE UP (ref 17–32)
COD CRY URNS QL: ABNORMAL
COLOR SPEC: YELLOW — SIGNIFICANT CHANGE UP
CREAT SERPL-MCNC: 0.8 MG/DL — SIGNIFICANT CHANGE UP (ref 0.7–1.5)
DIFF PNL FLD: ABNORMAL
EGFR: 73 ML/MIN/1.73M2 — SIGNIFICANT CHANGE UP
EOSINOPHIL # BLD AUTO: 0.07 K/UL — SIGNIFICANT CHANGE UP (ref 0–0.7)
EOSINOPHIL NFR BLD AUTO: 0.8 % — SIGNIFICANT CHANGE UP (ref 0–8)
EPI CELLS # UR: 5 /HPF — SIGNIFICANT CHANGE UP (ref 0–5)
FLUAV AG NPH QL: SIGNIFICANT CHANGE UP
FLUBV AG NPH QL: SIGNIFICANT CHANGE UP
GLUCOSE SERPL-MCNC: 95 MG/DL — SIGNIFICANT CHANGE UP (ref 70–99)
GLUCOSE UR QL: NEGATIVE — SIGNIFICANT CHANGE UP
HCT VFR BLD CALC: 40.4 % — SIGNIFICANT CHANGE UP (ref 37–47)
HGB BLD-MCNC: 13.6 G/DL — SIGNIFICANT CHANGE UP (ref 12–16)
HYALINE CASTS # UR AUTO: 1 /LPF — SIGNIFICANT CHANGE UP (ref 0–7)
IMM GRANULOCYTES NFR BLD AUTO: 0.3 % — SIGNIFICANT CHANGE UP (ref 0.1–0.3)
KETONES UR-MCNC: ABNORMAL
LACTATE SERPL-SCNC: 1.4 MMOL/L — SIGNIFICANT CHANGE UP (ref 0.7–2)
LEUKOCYTE ESTERASE UR-ACNC: ABNORMAL
LYMPHOCYTES # BLD AUTO: 1.45 K/UL — SIGNIFICANT CHANGE UP (ref 1.2–3.4)
LYMPHOCYTES # BLD AUTO: 16.9 % — LOW (ref 20.5–51.1)
MCHC RBC-ENTMCNC: 32.5 PG — HIGH (ref 27–31)
MCHC RBC-ENTMCNC: 33.7 G/DL — SIGNIFICANT CHANGE UP (ref 32–37)
MCV RBC AUTO: 96.7 FL — SIGNIFICANT CHANGE UP (ref 81–99)
MONOCYTES # BLD AUTO: 0.99 K/UL — HIGH (ref 0.1–0.6)
MONOCYTES NFR BLD AUTO: 11.5 % — HIGH (ref 1.7–9.3)
NEUTROPHILS # BLD AUTO: 5.98 K/UL — SIGNIFICANT CHANGE UP (ref 1.4–6.5)
NEUTROPHILS NFR BLD AUTO: 69.8 % — SIGNIFICANT CHANGE UP (ref 42.2–75.2)
NITRITE UR-MCNC: NEGATIVE — SIGNIFICANT CHANGE UP
NRBC # BLD: 0 /100 WBCS — SIGNIFICANT CHANGE UP (ref 0–0)
PH UR: 6 — SIGNIFICANT CHANGE UP (ref 5–8)
PLATELET # BLD AUTO: 201 K/UL — SIGNIFICANT CHANGE UP (ref 130–400)
POTASSIUM SERPL-MCNC: 4.7 MMOL/L — SIGNIFICANT CHANGE UP (ref 3.5–5)
POTASSIUM SERPL-SCNC: 4.7 MMOL/L — SIGNIFICANT CHANGE UP (ref 3.5–5)
PROT SERPL-MCNC: 6.7 G/DL — SIGNIFICANT CHANGE UP (ref 6–8)
PROT UR-MCNC: ABNORMAL
RBC # BLD: 4.18 M/UL — LOW (ref 4.2–5.4)
RBC # FLD: 12.8 % — SIGNIFICANT CHANGE UP (ref 11.5–14.5)
RBC CASTS # UR COMP ASSIST: 7 /HPF — HIGH (ref 0–4)
RSV RNA NPH QL NAA+NON-PROBE: SIGNIFICANT CHANGE UP
SARS-COV-2 RNA SPEC QL NAA+PROBE: SIGNIFICANT CHANGE UP
SODIUM SERPL-SCNC: 142 MMOL/L — SIGNIFICANT CHANGE UP (ref 135–146)
SP GR SPEC: 1.03 — SIGNIFICANT CHANGE UP (ref 1.01–1.03)
TROPONIN T SERPL-MCNC: <0.01 NG/ML — SIGNIFICANT CHANGE UP
UROBILINOGEN FLD QL: SIGNIFICANT CHANGE UP
WBC # BLD: 8.58 K/UL — SIGNIFICANT CHANGE UP (ref 4.8–10.8)
WBC # FLD AUTO: 8.58 K/UL — SIGNIFICANT CHANGE UP (ref 4.8–10.8)
WBC UR QL: 3 /HPF — SIGNIFICANT CHANGE UP (ref 0–5)

## 2023-01-04 PROCEDURE — 73630 X-RAY EXAM OF FOOT: CPT | Mod: 26,RT

## 2023-01-04 PROCEDURE — 70450 CT HEAD/BRAIN W/O DYE: CPT | Mod: 26

## 2023-01-04 PROCEDURE — 93010 ELECTROCARDIOGRAM REPORT: CPT

## 2023-01-04 PROCEDURE — 99223 1ST HOSP IP/OBS HIGH 75: CPT

## 2023-01-04 PROCEDURE — 71045 X-RAY EXAM CHEST 1 VIEW: CPT | Mod: 26

## 2023-01-04 RX ORDER — ENOXAPARIN SODIUM 100 MG/ML
40 INJECTION SUBCUTANEOUS EVERY 24 HOURS
Refills: 0 | Status: DISCONTINUED | OUTPATIENT
Start: 2023-01-04 | End: 2023-01-09

## 2023-01-04 RX ORDER — CARBIDOPA AND LEVODOPA 25; 100 MG/1; MG/1
1 TABLET ORAL ONCE
Refills: 0 | Status: COMPLETED | OUTPATIENT
Start: 2023-01-04 | End: 2023-01-04

## 2023-01-04 RX ORDER — ACETAMINOPHEN 500 MG
975 TABLET ORAL ONCE
Refills: 0 | Status: COMPLETED | OUTPATIENT
Start: 2023-01-04 | End: 2023-01-04

## 2023-01-04 RX ORDER — CARBIDOPA AND LEVODOPA 25; 100 MG/1; MG/1
1 TABLET ORAL
Refills: 0 | Status: DISCONTINUED | OUTPATIENT
Start: 2023-01-04 | End: 2023-01-09

## 2023-01-04 RX ORDER — LEVOTHYROXINE SODIUM 125 MCG
88 TABLET ORAL DAILY
Refills: 0 | Status: DISCONTINUED | OUTPATIENT
Start: 2023-01-04 | End: 2023-01-09

## 2023-01-04 RX ORDER — CEFTRIAXONE 500 MG/1
1000 INJECTION, POWDER, FOR SOLUTION INTRAMUSCULAR; INTRAVENOUS ONCE
Refills: 0 | Status: COMPLETED | OUTPATIENT
Start: 2023-01-04 | End: 2023-01-04

## 2023-01-04 RX ORDER — DONEPEZIL HYDROCHLORIDE 10 MG/1
1 TABLET, FILM COATED ORAL
Qty: 90 | Refills: 0 | DISCHARGE

## 2023-01-04 RX ORDER — QUETIAPINE FUMARATE 200 MG/1
12.5 TABLET, FILM COATED ORAL DAILY
Refills: 0 | Status: DISCONTINUED | OUTPATIENT
Start: 2023-01-04 | End: 2023-01-04

## 2023-01-04 RX ORDER — SODIUM CHLORIDE 9 MG/ML
1000 INJECTION INTRAMUSCULAR; INTRAVENOUS; SUBCUTANEOUS ONCE
Refills: 0 | Status: COMPLETED | OUTPATIENT
Start: 2023-01-04 | End: 2023-01-04

## 2023-01-04 RX ORDER — ESCITALOPRAM OXALATE 10 MG/1
20 TABLET, FILM COATED ORAL DAILY
Refills: 0 | Status: DISCONTINUED | OUTPATIENT
Start: 2023-01-04 | End: 2023-01-09

## 2023-01-04 RX ORDER — SIMVASTATIN 20 MG/1
20 TABLET, FILM COATED ORAL AT BEDTIME
Refills: 0 | Status: DISCONTINUED | OUTPATIENT
Start: 2023-01-04 | End: 2023-01-09

## 2023-01-04 RX ORDER — QUETIAPINE FUMARATE 200 MG/1
0.5 TABLET, FILM COATED ORAL
Qty: 0 | Refills: 0 | DISCHARGE

## 2023-01-04 RX ORDER — QUETIAPINE FUMARATE 200 MG/1
12.5 TABLET, FILM COATED ORAL AT BEDTIME
Refills: 0 | Status: DISCONTINUED | OUTPATIENT
Start: 2023-01-04 | End: 2023-01-09

## 2023-01-04 RX ORDER — DONEPEZIL HYDROCHLORIDE 10 MG/1
1 TABLET, FILM COATED ORAL
Qty: 0 | Refills: 0 | DISCHARGE

## 2023-01-04 RX ORDER — SIMVASTATIN 20 MG/1
20 TABLET, FILM COATED ORAL
Qty: 90 | Refills: 0 | DISCHARGE

## 2023-01-04 RX ORDER — ACETAMINOPHEN 500 MG
650 TABLET ORAL EVERY 6 HOURS
Refills: 0 | Status: DISCONTINUED | OUTPATIENT
Start: 2023-01-04 | End: 2023-01-09

## 2023-01-04 RX ORDER — LEVOTHYROXINE SODIUM 125 MCG
1 TABLET ORAL
Qty: 90 | Refills: 0 | DISCHARGE

## 2023-01-04 RX ORDER — DONEPEZIL HYDROCHLORIDE 10 MG/1
10 TABLET, FILM COATED ORAL AT BEDTIME
Refills: 0 | Status: DISCONTINUED | OUTPATIENT
Start: 2023-01-04 | End: 2023-01-04

## 2023-01-04 RX ORDER — CARBIDOPA AND LEVODOPA 25; 100 MG/1; MG/1
1 TABLET ORAL
Refills: 0 | Status: DISCONTINUED | OUTPATIENT
Start: 2023-01-04 | End: 2023-01-04

## 2023-01-04 RX ORDER — CARBIDOPA AND LEVODOPA 25; 100 MG/1; MG/1
1 TABLET ORAL
Qty: 0 | Refills: 0 | DISCHARGE

## 2023-01-04 RX ADMIN — Medication 0.5 MILLIGRAM(S): at 04:54

## 2023-01-04 RX ADMIN — ENOXAPARIN SODIUM 40 MILLIGRAM(S): 100 INJECTION SUBCUTANEOUS at 17:35

## 2023-01-04 RX ADMIN — QUETIAPINE FUMARATE 12.5 MILLIGRAM(S): 200 TABLET, FILM COATED ORAL at 23:52

## 2023-01-04 RX ADMIN — CEFTRIAXONE 100 MILLIGRAM(S): 500 INJECTION, POWDER, FOR SOLUTION INTRAMUSCULAR; INTRAVENOUS at 04:54

## 2023-01-04 RX ADMIN — Medication 975 MILLIGRAM(S): at 02:44

## 2023-01-04 RX ADMIN — Medication 975 MILLIGRAM(S): at 19:02

## 2023-01-04 RX ADMIN — CARBIDOPA AND LEVODOPA 1 TABLET(S): 25; 100 TABLET ORAL at 14:12

## 2023-01-04 RX ADMIN — CARBIDOPA AND LEVODOPA 1 TABLET(S): 25; 100 TABLET ORAL at 23:53

## 2023-01-04 RX ADMIN — SIMVASTATIN 20 MILLIGRAM(S): 20 TABLET, FILM COATED ORAL at 23:53

## 2023-01-04 RX ADMIN — SODIUM CHLORIDE 1000 MILLILITER(S): 9 INJECTION INTRAMUSCULAR; INTRAVENOUS; SUBCUTANEOUS at 02:44

## 2023-01-04 RX ADMIN — CARBIDOPA AND LEVODOPA 1 TABLET(S): 25; 100 TABLET ORAL at 04:54

## 2023-01-04 RX ADMIN — ESCITALOPRAM OXALATE 20 MILLIGRAM(S): 10 TABLET, FILM COATED ORAL at 14:12

## 2023-01-04 RX ADMIN — CARBIDOPA AND LEVODOPA 1 TABLET(S): 25; 100 TABLET ORAL at 17:35

## 2023-01-04 NOTE — H&P ADULT - ASSESSMENT
82F w/ h/o Parkinson Disease, dementia, mood disorder (unspecified), hypothyroidism, and DLD p/w AMS. Admitted to medicine for AMS i/s/o suspected febrile illness.    #Febrile Illness  #Toxic Metabolic Encephalopathy, suspected  Initially p/w AMS. Febrile to 100.5F, but no other signs of SIRS positive on admission. Infection w/u negative so far (CXR and UA unremarkable).  - f/u BCx and UCx  - obtain rEEG (r/o seizure; was planned to be done as outpatient anyway)    #Parkinson Disease  #Dementia w/ Behavioral Disturbance  #Mood Disorder, unspecified  Mentation has progressively worsened over past few weeks. Now having periods of confusion a/w visual hallucinations and aggressive behavior. Although febrile on admission, symptoms started prior to fever. If infectious w/u negative, suspect progression of PD/dementia vs polypharmacy. At baseline, ambulates w/ rolling walker. Follows w/ Dr. Gannon.  - c/w carbidopa-levodopa 25mg-100mg PO QID  - c/w carbidopa-levodopa ER 50mg-200mg PO BID (once at bedtime and once in middle of night)  - c/w escitalopram 20mg PO QD  - c/w donepezil 10mg PO QHS  - c/w quetiapine 12.5mg PO QD    #Hypothyroidism  - c/w levothyroxine 88mcg PO QD  - obtain TSH and fT4    #Dyslipidemia  - c/w simvastatin 20mg PO QHS    DVT PPX: Lovenox 40mg SQ QD  GI PPX: none indicated  DIET: DASH/TLC  ACTIVITY: IAT  CODE STATUS:   DISPOSITION: From Home    PENDING: rEEG; improvement in mentation 82F w/ h/o Parkinson Disease, dementia, mood disorder (unspecified), hypothyroidism, and DLD p/w AMS. Admitted to medicine for AMS i/s/o suspected febrile illness vs progression of Parkinson Disease.    #Febrile Illness  #Toxic Metabolic Encephalopathy, suspected  Initially p/w AMS. Febrile to 100.5F, but no other signs of SIRS positive on admission. Infection w/u negative so far (CXR and UA unremarkable).  - f/u BCx and UCx  - obtain procalcitonin and RVP  - obtain CPK (r/o rhabdomyolysis; UA shows moderate blood, but only RBC 7)  - obtain rEEG (r/o seizure; was planned to be done as outpatient anyway)    #Parkinson Disease  #Dementia w/ Behavioral Disturbance  #Mood Disorder, unspecified  Mentation has progressively worsened over past few weeks. Now having periods of confusion a/w visual hallucinations and aggressive behavior. Although febrile on admission, symptoms started prior to fever. If infectious w/u negative, suspect progression of PD/dementia vs polypharmacy. At baseline, ambulates w/ rolling walker. Follows w/ Dr. Gannon.  - c/w carbidopa-levodopa 25mg-100mg PO QID  - c/w carbidopa-levodopa ER 50mg-200mg PO QHS  - c/w escitalopram 20mg PO QD  - c/w quetiapine 12.5mg PO QHS    #Hypothyroidism  - c/w levothyroxine 88mcg PO QD  - obtain TSH and fT4    #Dyslipidemia  - c/w simvastatin 20mg PO QHS    DVT PPX: Lovenox 40mg SQ QD  GI PPX: none indicated  DIET: DASH/TLC  ACTIVITY: IAT  CODE STATUS: DNR/DNI; MOLST FORM TO BE SIGNED BY ATTENDING  DISPOSITION: From Home    PENDING: CPK; rEEG; improvement in mentation 82F w/ h/o Parkinson Disease, dementia, mood disorder (unspecified), hypothyroidism, and DLD p/w AMS. Admitted to medicine for AMS i/s/o suspected febrile illness vs progression of Parkinson Disease.    #Febrile Illness  #Toxic Metabolic Encephalopathy, suspected  Initially p/w AMS. Febrile to 100.5F, but no other signs of SIRS positive on admission. Infection w/u negative so far (CXR and UA unremarkable).  - c/w ceftriaxone 1g IV QD (will continue for now given fever present on admission)  - f/u BCx and UCx  - obtain CTH (r/o mass/bleed)  - obtain procalcitonin and RVP  - obtain CPK (r/o rhabdomyolysis; UA shows moderate blood, but only RBC 7)  - obtain rEEG (r/o seizure; was planned to be done as outpatient anyway)    #Parkinson Disease  #Dementia w/ Behavioral Disturbance  #Mood Disorder, unspecified  Mentation has progressively worsened over past few weeks. Now having periods of confusion a/w visual hallucinations and aggressive behavior. Although febrile on admission, symptoms started prior to fever. If infectious w/u negative, suspect progression of PD/dementia vs polypharmacy. At baseline, ambulates w/ rolling walker. Follows w/ Dr. Gannon.  - c/w carbidopa-levodopa 25mg-100mg PO QID  - c/w carbidopa-levodopa ER 50mg-200mg PO QHS  - c/w escitalopram 20mg PO QD  - c/w quetiapine 12.5mg PO QHS    #Hypothyroidism  - c/w levothyroxine 88mcg PO QD  - obtain TSH and fT4    #Dyslipidemia  - c/w simvastatin 20mg PO QHS    DVT PPX: Lovenox 40mg SQ QD  GI PPX: none indicated  DIET: DASH/TLC  ACTIVITY: IAT  CODE STATUS: DNR/DNI; MOLST FORM TO BE SIGNED BY ATTENDING  DISPOSITION: From Home    PENDING: CTH; CPK; rEEG; improvement in mentation

## 2023-01-04 NOTE — H&P ADULT - HISTORY OF PRESENT ILLNESS
82F w/ h/o Parkinson Disease, dementia, mood disorder (unspecified), hypothyroidism, and DLD p/w AMS. Per daughter, pt has been progressively more agitated over past few weeks. Mentation fluctuates frequently from day to day and periods of confusion a/w visual hallucinations and aggressive behavior. Daughter reports signs of cognitive and memory decline even on good days. Reportedly had fever at home (T max 100.4F), prompting ED evaluation. Per daughter, no reported chills, CP, palpitations, SOB, abdominal pain, n/v/d, dysuria, or LE swelling.     Of note, pt recently saw neurologist (Dr. Gannon) on 12/20 for similar complaints of worsening behavioral disturbances. At the time, pt started on Seroquel 12.5mg qd.    In ED, pt febrile to 100.5F, but otherwise HD stable on vitals. WBC 8.58 and Lactate 1.4. CXR and UA unremarkable for infectious process. S/P 1L NS bolus, IV ceftriaxone, ativan 0.5mg IV x1 (for agitation). Admitted to medicine for AMS i/s/o suspected febrile illness. 82F w/ h/o Parkinson Disease, dementia, mood disorder (unspecified), hypothyroidism, and DLD p/w AMS. Per daughter, pt has been progressively more confused and agitated over past two months. Mentation fluctuates frequently from day to day. Periods of confusion can be a/w visual hallucinations and aggressive behavior. Daughter reports signs of cognitive and memory decline even on good days. However, pt acutely worsened over past 1-2 days. Has had worsening confusion, worsening stiffness, and even required daughter to assist w/ feeds (which pt has never required previously at baseline). Additionally, had fever at home (T max 100.4F), prompting ED evaluation. Per daughter, no reported chills, CP, palpitations, SOB, abdominal pain, n/v/d, dysuria, or LE swelling. Of note, pt recently saw neurologist (Dr. Gannon) on 12/20 for similar complaints of worsening behavioral disturbances. At the time, pt started on Seroquel 12.5mg qd.    In ED, pt febrile to 100.5F, but otherwise HD stable on vitals. WBC 8.58 and Lactate 1.4. CXR and UA unremarkable for infectious process. S/P 1L NS bolus, IV ceftriaxone, ativan 0.5mg IV x1 (for agitation). Admitted to medicine for AMS i/s/o suspected febrile illness.

## 2023-01-04 NOTE — H&P ADULT - NSHPPHYSICALEXAM_GEN_ALL_CORE
VITALS  T(C): 38.1 (01-03-23 @ 23:14), Max: 38.1 (01-03-23 @ 23:14)  HR: 88 (01-03-23 @ 23:14) (88 - 88)  BP: 139/70 (01-03-23 @ 23:14) (139/70 - 139/70)  RR: 16 (01-03-23 @ 23:14) (16 - 16)  SpO2: 94% (01-03-23 @ 23:14) (94% - 94%)    PHYSICAL EXAM  GENERAL: no acute respirator distress  NEURO: AO x1 (oriented to daughter), PERRLA, EOMI, moving all extremities, follows simple commands  HEAD:  Atraumatic, Normocephalic  EYES: conjunctiva and sclera clear  NECK: Supple, No JVD, no lymphadenopathy, no thyromegaly  CHEST/LUNG: Clear to auscultation bilaterally; No wheezes, rales or rhonchi  HEART: Regular rate and rhythm; No murmurs, rubs, or gallops  ABDOMEN: Soft, Nontender, Nondistended; Bowel sounds present, no masses.  EXTREMITIES:  2+ Peripheral Pulses, No clubbing, cyanosis, or edema  SKIN: Warm, dry, intact, no rashes or lesions VITALS  T(C): 38.1 (01-03-23 @ 23:14), Max: 38.1 (01-03-23 @ 23:14)  HR: 88 (01-03-23 @ 23:14) (88 - 88)  BP: 139/70 (01-03-23 @ 23:14) (139/70 - 139/70)  RR: 16 (01-03-23 @ 23:14) (16 - 16)  SpO2: 94% (01-03-23 @ 23:14) (94% - 94%)    PHYSICAL EXAM  GENERAL: no acute respiratory distress  NEURO: somnolent, but arousable to sternal rub (s/p ativan IV @ 5AM)  HEAD:  Atraumatic, Normocephalic  EYES: conjunctiva and sclera clear  NECK: Supple, No JVD, no lymphadenopathy, no thyromegaly  CHEST/LUNG: Clear to auscultation bilaterally; No wheezes, rales or rhonchi  HEART: Regular rate and rhythm; No murmurs, rubs, or gallops  ABDOMEN: Soft, Nontender, Nondistended; Bowel sounds present, no masses.  EXTREMITIES:  2+ Peripheral Pulses, No clubbing, cyanosis, or edema  SKIN: Warm, dry, intact, no rashes or lesions

## 2023-01-04 NOTE — PATIENT PROFILE ADULT - FALL HARM RISK - HARM RISK INTERVENTIONS

## 2023-01-04 NOTE — ED PROVIDER NOTE - CLINICAL SUMMARY MEDICAL DECISION MAKING FREE TEXT BOX
fever, delerium, brought by family bc of worsening ms/agitation  labs, abx, supportive care given  pt will likely need admission for iv abx

## 2023-01-04 NOTE — H&P ADULT - CONVERSATION DETAILS
Discussed advanced care planning w/ daughter over phone as patient has prior documentation of DNR/DNI. Daughter confirmed that patient is still DNR/DNI.

## 2023-01-04 NOTE — ED PROVIDER NOTE - PHYSICAL EXAMINATION
CONSTITUTIONAL: Elderly female; in no apparent distress.   EYES: PERRL; EOM intact.   CARDIOVASCULAR: Normal S1, S2; no murmurs, rubs, or gallops.   RESPIRATORY: Normal chest excursion with respiration; breath sounds clear and equal bilaterally; no wheezes, rhonchi, or rales.  GI/: Normal bowel sounds; non-distended; non-tender; no palpable organomegaly.   MS: No calf swelling and tenderness.  SKIN: No rash.  NEURO/PSYCH: Alert but confused.  Oriented to daughter.  Follow commands and move all extremities.  No nuchal rigidity. CONSTITUTIONAL: Elderly female; in no apparent distress.   EYES: PERRL; EOM intact.   NECK: no meningismus  CARDIOVASCULAR: Normal S1, S2; no murmurs, rubs, or gallops.   RESPIRATORY: Normal chest excursion with respiration; breath sounds clear and equal bilaterally; no wheezes, rhonchi, or rales.  GI/: Normal bowel sounds; non-distended; non-tender; no palpable organomegaly.   MS: No calf swelling and tenderness.  SKIN: No rash.  NEURO/PSYCH: Alert but confused.  Oriented to daughter.  Follow commands and move all extremities.  No nuchal rigidity.

## 2023-01-04 NOTE — H&P ADULT - NSHPSOCIALHISTORY_GEN_ALL_CORE
Lives w/ daughter. Retired (previously worked as PCA at [employer]). Ambulates w/ RW at baseline. Able to perform most ADL's at baseline, but relies on daughter for assistance.

## 2023-01-04 NOTE — ED PROVIDER NOTE - CHRONIC CONDITION AFFECTING CARE
[FreeTextEntry1] : 65 year male presents for follow up status post excision of left eyebrow cyst. Patient developed erythema and scaling around incision 
Other

## 2023-01-04 NOTE — H&P ADULT - ATTENDING COMMENTS
Pt seen and examined in the ED this morning. She was sleeping comfortably and did not want to engage in conversation. Case discussed with the admitting resident who obtained full history from the daughter today. History as above. Unable to obtain ROS from pt.     T(F): 100.5 (01-03-23 @ 23:14), Max: 100.5 (01-03-23 @ 23:14)  HR: 88 (01-03-23 @ 23:14) (88 - 88)  BP: 139/70 (01-03-23 @ 23:14) (139/70 - 139/70)  RR: 16 (01-03-23 @ 23:14) (16 - 16)  SpO2: 94% (01-03-23 @ 23:14) (94% - 94%) on RA    PHYSICAL EXAM:  GENERAL: NAD  HEAD:  Normocephalic  EYES:  conjunctiva and sclera clear  ENMT: Moist mucous membranes  NERVOUS SYSTEM: sleepy but arousable  CHEST/LUNG: CTA b/l  HEART: Regular rate and rhythm  ABDOMEN: Soft, Nontender, Nondistended; Bowel sounds present  EXTREMITIES:   No edema  SKIN: warm, dry    Care Discussed with Consultants/Other Providers [ x] YES  [ ] NO  previous notes reviewed by me    LABS:                        13.6   8.58  )-----------( 201      ( 04 Jan 2023 02:59 )             40.4     01-04    142  |  103  |  20  ----------------------------<  95  4.7   |  26  |  0.8    Ca    9.4      04 Jan 2023 02:59  TPro  6.7  /  Alb  4.8  /  TBili  0.5  /  DBili  x   /  AST  18  /  ALT  <5  /  AlkPhos  64  01-04    RADIOLOGY & ADDITIONAL TESTS:  Imaging and report Personally Reviewed:  [x] YES  [ ] NO  CXR reviewed and interpreted by me: no infiltrate or effusion noted - check official report    81 y/o man with PMH of Parkinson Disease, dementia, mood disorder (unspecified), hypothyroidism and DLD was admitted for altered mental status and fever to 100.5  1. Fever - 100.5 last night  UA with small leukocyte esterase and WBC 3, moderate blood, RBC 7 - not very impressive for UTI - f/u urine and blood cultures and monitor temps  f/u RVP, procal  CXR without gross infiltrate  WBC WNL  no SIRS or sepsis    2. Altered mental status - possibly due to metabolic encephalopathy from infection vs worsening dementia  Head CT scan, check EEG, check TSH level, reorient as needed, family involved  3. Parkinson disease - continue home meds  4. Dementia - on seroquel  5. Continue current management for hypothyroidism and dyslipidemia  6. DVT prophylaxis    DNR/DNI status - resident confirmed with the daughter today    PROGRESS NOTE HANDOFF    Pending: blood and urine cultures, Head CT scan report, EEG, RVP, procal, improvement in mental status, PT consult when more cooperative  Family discussion: resident updated daughter on the plan of care  Disposition: from home

## 2023-01-04 NOTE — ED PROVIDER NOTE - CONSIDERATION OF ADMISSION OBSERVATION
febrile, likely delerium from infection. abx given, likely needs admission for further care. Consideration of Admission/Observation

## 2023-01-04 NOTE — ED PROVIDER NOTE - OBJECTIVE STATEMENT
82 years old female history of hypothyroid, Parkinson's, dementia present with daughter complaint confusion and agitation tonight.  As per daughter, patient is more agitated and not cooperating at home.  She is noted fever to T-max 100.4 at home so she brought patient to ED for evaluation.  Daughter otherwise denies chest pain, abdominal pain, coughing, vomiting, diarrhea or change of appetite.

## 2023-01-05 LAB
ALBUMIN SERPL ELPH-MCNC: 4.5 G/DL — SIGNIFICANT CHANGE UP (ref 3.5–5.2)
ALP SERPL-CCNC: 58 U/L — SIGNIFICANT CHANGE UP (ref 30–115)
ALT FLD-CCNC: <5 U/L — SIGNIFICANT CHANGE UP (ref 0–41)
ANION GAP SERPL CALC-SCNC: 10 MMOL/L — SIGNIFICANT CHANGE UP (ref 7–14)
AST SERPL-CCNC: 19 U/L — SIGNIFICANT CHANGE UP (ref 0–41)
BASOPHILS # BLD AUTO: 0.07 K/UL — SIGNIFICANT CHANGE UP (ref 0–0.2)
BASOPHILS NFR BLD AUTO: 1.3 % — HIGH (ref 0–1)
BILIRUB SERPL-MCNC: 0.5 MG/DL — SIGNIFICANT CHANGE UP (ref 0.2–1.2)
BUN SERPL-MCNC: 10 MG/DL — SIGNIFICANT CHANGE UP (ref 10–20)
CALCIUM SERPL-MCNC: 8.9 MG/DL — SIGNIFICANT CHANGE UP (ref 8.4–10.4)
CHLORIDE SERPL-SCNC: 103 MMOL/L — SIGNIFICANT CHANGE UP (ref 98–110)
CK SERPL-CCNC: 171 U/L — SIGNIFICANT CHANGE UP (ref 0–225)
CO2 SERPL-SCNC: 28 MMOL/L — SIGNIFICANT CHANGE UP (ref 17–32)
CREAT SERPL-MCNC: 0.6 MG/DL — LOW (ref 0.7–1.5)
EGFR: 89 ML/MIN/1.73M2 — SIGNIFICANT CHANGE UP
EOSINOPHIL # BLD AUTO: 0.2 K/UL — SIGNIFICANT CHANGE UP (ref 0–0.7)
EOSINOPHIL NFR BLD AUTO: 3.7 % — SIGNIFICANT CHANGE UP (ref 0–8)
GLUCOSE SERPL-MCNC: 75 MG/DL — SIGNIFICANT CHANGE UP (ref 70–99)
HCT VFR BLD CALC: 39.8 % — SIGNIFICANT CHANGE UP (ref 37–47)
HGB BLD-MCNC: 13.3 G/DL — SIGNIFICANT CHANGE UP (ref 12–16)
IMM GRANULOCYTES NFR BLD AUTO: 0.2 % — SIGNIFICANT CHANGE UP (ref 0.1–0.3)
LYMPHOCYTES # BLD AUTO: 1.32 K/UL — SIGNIFICANT CHANGE UP (ref 1.2–3.4)
LYMPHOCYTES # BLD AUTO: 24.7 % — SIGNIFICANT CHANGE UP (ref 20.5–51.1)
MAGNESIUM SERPL-MCNC: 2 MG/DL — SIGNIFICANT CHANGE UP (ref 1.8–2.4)
MCHC RBC-ENTMCNC: 31.8 PG — HIGH (ref 27–31)
MCHC RBC-ENTMCNC: 33.4 G/DL — SIGNIFICANT CHANGE UP (ref 32–37)
MCV RBC AUTO: 95.2 FL — SIGNIFICANT CHANGE UP (ref 81–99)
MONOCYTES # BLD AUTO: 0.57 K/UL — SIGNIFICANT CHANGE UP (ref 0.1–0.6)
MONOCYTES NFR BLD AUTO: 10.7 % — HIGH (ref 1.7–9.3)
NEUTROPHILS # BLD AUTO: 3.18 K/UL — SIGNIFICANT CHANGE UP (ref 1.4–6.5)
NEUTROPHILS NFR BLD AUTO: 59.4 % — SIGNIFICANT CHANGE UP (ref 42.2–75.2)
NRBC # BLD: 0 /100 WBCS — SIGNIFICANT CHANGE UP (ref 0–0)
PHOSPHATE SERPL-MCNC: 2.6 MG/DL — SIGNIFICANT CHANGE UP (ref 2.1–4.9)
PLATELET # BLD AUTO: 187 K/UL — SIGNIFICANT CHANGE UP (ref 130–400)
POTASSIUM SERPL-MCNC: 3.7 MMOL/L — SIGNIFICANT CHANGE UP (ref 3.5–5)
POTASSIUM SERPL-SCNC: 3.7 MMOL/L — SIGNIFICANT CHANGE UP (ref 3.5–5)
PROT SERPL-MCNC: 6.4 G/DL — SIGNIFICANT CHANGE UP (ref 6–8)
RBC # BLD: 4.18 M/UL — LOW (ref 4.2–5.4)
RBC # FLD: 12.7 % — SIGNIFICANT CHANGE UP (ref 11.5–14.5)
SODIUM SERPL-SCNC: 141 MMOL/L — SIGNIFICANT CHANGE UP (ref 135–146)
T4 FREE SERPL-MCNC: 1.7 NG/DL — SIGNIFICANT CHANGE UP (ref 0.9–1.8)
TSH SERPL-MCNC: 1.28 UIU/ML — SIGNIFICANT CHANGE UP (ref 0.27–4.2)
WBC # BLD: 5.35 K/UL — SIGNIFICANT CHANGE UP (ref 4.8–10.8)
WBC # FLD AUTO: 5.35 K/UL — SIGNIFICANT CHANGE UP (ref 4.8–10.8)

## 2023-01-05 PROCEDURE — 99221 1ST HOSP IP/OBS SF/LOW 40: CPT

## 2023-01-05 PROCEDURE — 99232 SBSQ HOSP IP/OBS MODERATE 35: CPT

## 2023-01-05 RX ORDER — BACITRACIN ZINC 500 UNIT/G
1 OINTMENT IN PACKET (EA) TOPICAL DAILY
Refills: 0 | Status: DISCONTINUED | OUTPATIENT
Start: 2023-01-05 | End: 2023-01-09

## 2023-01-05 RX ORDER — DIPHENHYDRAMINE HCL 50 MG
12.5 CAPSULE ORAL ONCE
Refills: 0 | Status: COMPLETED | OUTPATIENT
Start: 2023-01-05 | End: 2023-01-05

## 2023-01-05 RX ADMIN — CARBIDOPA AND LEVODOPA 1 TABLET(S): 25; 100 TABLET ORAL at 18:48

## 2023-01-05 RX ADMIN — CARBIDOPA AND LEVODOPA 1 TABLET(S): 25; 100 TABLET ORAL at 12:10

## 2023-01-05 RX ADMIN — ENOXAPARIN SODIUM 40 MILLIGRAM(S): 100 INJECTION SUBCUTANEOUS at 18:47

## 2023-01-05 RX ADMIN — SIMVASTATIN 20 MILLIGRAM(S): 20 TABLET, FILM COATED ORAL at 21:48

## 2023-01-05 RX ADMIN — CARBIDOPA AND LEVODOPA 1 TABLET(S): 25; 100 TABLET ORAL at 05:53

## 2023-01-05 RX ADMIN — QUETIAPINE FUMARATE 12.5 MILLIGRAM(S): 200 TABLET, FILM COATED ORAL at 21:48

## 2023-01-05 RX ADMIN — CARBIDOPA AND LEVODOPA 1 TABLET(S): 25; 100 TABLET ORAL at 00:14

## 2023-01-05 RX ADMIN — CARBIDOPA AND LEVODOPA 1 TABLET(S): 25; 100 TABLET ORAL at 21:48

## 2023-01-05 RX ADMIN — ESCITALOPRAM OXALATE 20 MILLIGRAM(S): 10 TABLET, FILM COATED ORAL at 12:09

## 2023-01-05 RX ADMIN — Medication 88 MICROGRAM(S): at 05:53

## 2023-01-05 NOTE — CONSULT NOTE ADULT - ASSESSMENT
82F w/ h/o Parkinson Disease, dementia, mood disorder (unspecified), hypothyroidism, and DLD p/w AMS. Neurology consulted for AMS. On exam Patient is AOx1 (to self), follows simple commands with clues, speech is difficult to understand, mildly hypophonic and dysarthric, with frontal release signs present.  According to patient daughter this is inconsistent with patients base line. This could be secondary to possible epileptic source vs worsening underlying dementia super-imposed with hospital delirium.      Recommendation:    VEEG  Seizure precaution  Fall precaution  keep magnesium level >2    Discussed with attending Dr. Womack   82F w/ h/o Parkinson Disease, dementia, mood disorder (unspecified), hypothyroidism, and DLD p/w AMS. Neurology consulted for AMS. On exam Patient is AOx1 (to self), follows simple commands with clues, speech is difficult to understand, mildly hypophonic and dysarthric, with frontal release signs present.  According to patient daughter this is inconsistent with patients base line. This could be secondary to possible epileptic source vs worsening underlying dementia super-imposed with hospital delirium.      Recommendation:    VEEG  Seizure precaution  Fall precaution  keep magnesium level >2  Will consider Brain MRI and LP after VEEG     Discussed with attending Dr. Womack

## 2023-01-05 NOTE — CONSULT NOTE ADULT - SUBJECTIVE AND OBJECTIVE BOX
Neurology Consult    Patient is a 82y old  Female who presents with a chief complaint of AMS (2023 09:42)    Nueorlogy being consulted for AMS  HPI:  82F w/ h/o Parkinson Disease, dementia, mood disorder (unspecified), hypothyroidism, and DLD p/w AMS. Per daughter, pt has been progressively more confused and agitated over past two months. Mentation fluctuates frequently from day to day. Periods of confusion can be a/w visual hallucinations and aggressive behavior. Daughter reports signs of cognitive and memory decline even on good days. However, pt acutely worsened over past 1-2 days. Has had worsening confusion, worsening stiffness, and even required daughter to assist w/ feeds (which pt has never required previously at baseline). Additionally, had fever at home (T max 100.4F), prompting ED evaluation. Per daughter, no reported chills, CP, palpitations, SOB, abdominal pain, n/v/d, dysuria, or LE swelling. Of note, pt recently saw neurologist (Dr. Gannon) on  for similar complaints of worsening behavioral disturbances. At the time, pt started on Seroquel 12.5mg qd.     In ED, pt febrile to 100.5F, but otherwise HD stable on vitals. WBC 8.58 and Lactate 1.4. CXR and UA unremarkable for infectious process. S/P 1L NS bolus, IV ceftriaxone, ativan 0.5mg IV x1 (for agitation). Admitted to medicine for AMS i/s/o suspected febrile illness. (2023 09:42)      PAST MEDICAL & SURGICAL HISTORY:  Hypothyroid      Parkinson disease      Dyslipidemia      Dementia      No significant past surgical history          FAMILY HISTORY:      Social History: (-) x 3    Allergies    No Known Allergies    Intolerances        MEDICATIONS  (STANDING):  carbidopa/levodopa  25/100 1 Tablet(s) Oral four times a day  carbidopa/levodopa CR 50/200 1 Tablet(s) Oral <User Schedule>  enoxaparin Injectable 40 milliGRAM(s) SubCutaneous every 24 hours  escitalopram 20 milliGRAM(s) Oral daily  levothyroxine 88 MICROGram(s) Oral daily  QUEtiapine 12.5 milliGRAM(s) Oral at bedtime  simvastatin 20 milliGRAM(s) Oral at bedtime    MEDICATIONS  (PRN):  acetaminophen     Tablet .. 650 milliGRAM(s) Oral every 6 hours PRN Temp greater or equal to 38C (100.4F), Mild Pain (1 - 3)      Review of systems:    Limited by patient by patient mental status    Vital Signs Last 24 Hrs  T(C): 36.7 (2023 21:14), Max: 36.7 (2023 21:14)  T(F): 98.1 (2023 21:14), Max: 98.1 (2023 21:14)  HR: 68 (2023 21:14) (68 - 77)  BP: 129/63 (2023 21:14) (129/63 - 158/71)  BP(mean): 87 (2023 21:14) (87 - 87)  RR: 18 (2023 21:14) (14 - 18)  SpO2: 97% (2023 21:14) (95% - 97%)    Parameters below as of 2023 21:14  Patient On (Oxygen Delivery Method): room air        Examination:  General:  Appearance is consistent with chronologic age.  No abnormal facies.  Cognitive/Language:  The patient is oriented to perso  AOX1 to self follows simple commands  Eyes: intact EOMI   visual field is intact   Face:  Facial sensation normal V1 - 3, no facial asymmetry.    Ears/Nose/Throat:  Hearing grossly intact   Motor examination:  increased tone,   range of motion intact   No tenderness, twitching, tremors or involuntary movements.  Formal Muscle Strength Testing: (MRC grade R/L) 5/5 UE; 5/5 LE.  No observable drift.  Reflexes:   3+ b/l  biceps, triceps, brachioradialis, patella and Achilles.    Sensory examination:   Intact to light touch and  pain, in all extremities  Cerebellum:   FTN/HKS intact with normal CLIFFORD in all limbs.  No dysmetria or dysdiadokinesia.    Gait narrow based and no    Respiratory:  no audible wheezing or inspiratory stridor.  no use of accessory muscles.   Cardiac: pulse palpable, no audible bruits  Abdomen: supple, no guarding, no TTP    Labs:   CBC Full  -  ( 2023 07:25 )  WBC Count : 5.35 K/uL  RBC Count : 4.18 M/uL  Hemoglobin : 13.3 g/dL  Hematocrit : 39.8 %  Platelet Count - Automated : 187 K/uL  Mean Cell Volume : 95.2 fL  Mean Cell Hemoglobin : 31.8 pg  Mean Cell Hemoglobin Concentration : 33.4 g/dL  Auto Neutrophil # : 3.18 K/uL  Auto Lymphocyte # : 1.32 K/uL  Auto Monocyte # : 0.57 K/uL  Auto Eosinophil # : 0.20 K/uL  Auto Basophil # : 0.07 K/uL  Auto Neutrophil % : 59.4 %  Auto Lymphocyte % : 24.7 %  Auto Monocyte % : 10.7 %  Auto Eosinophil % : 3.7 %  Auto Basophil % : 1.3 %        141  |  103  |  10  ----------------------------<  75  3.7   |  28  |  0.6<L>    Ca    8.9      2023 07:25  Phos  2.6       Mg     2.0         TPro  6.4  /  Alb  4.5  /  TBili  0.5  /  DBili  x   /  AST  19  /  ALT  x   /  AlkPhos  58      LIVER FUNCTIONS - ( 2023 07:25 )  Alb: 4.5 g/dL / Pro: 6.4 g/dL / ALK PHOS: 58 U/L / ALT: x     / AST: 19 U/L / GGT: x             Urinalysis Basic - ( 2023 02:59 )    Color: Yellow / Appearance: Clear / S.029 / pH: x  Gluc: x / Ketone: Small  / Bili: Negative / Urobili: <2 mg/dL   Blood: x / Protein: 30 mg/dL / Nitrite: Negative   Leuk Esterase: Small / RBC: 7 /HPF / WBC 3 /HPF   Sq Epi: x / Non Sq Epi: 5 /HPF / Bacteria: Negative          Neuroimaging:  NCHCT: CT Head No Cont:   ACC: [id number] EXAM:  CT BRAIN                          PROCEDURE DATE:  2023          INTERPRETATION:  Clinical History / Reason for exam: Alteration of   consciousness    Technique: Noncontrast head CT.  Contiguous unenhanced CT axial imagesof   the head from the base to the vertex with coronal and sagittal reformats.    Comparison: CT head 10/7/19, MRI brain 1/10/19    Findings:    There is motion artifact limiting evaluation.    The ventricles and cortical sulci are within normal limits for age.    There are stable patchy hypodensities throughout the hemispheric white   matter without mass effect compatible with mild chronic microvascular   changes.    There is no acute intracranial hemorrhage, extra-axial fluid collection   or midline shift.  Gray white matter differentiation is maintained.    There is typical posterior fossa streak artifact.    Vascular calcifications are noted.    The visualized paranasal sinuses and mastoids are clear.    IMPRESSION:    Motion limited study. No definite evidence of acute intracranial   pathology. Stable exam.    --- End of Report ---            NERY CHILDRESS MD; Attending Radiologist  This document has been electronically signed. 2023  1:37PM (23 @ 13:17)      23 @ 09:37       Neurology Consult    Patient is a 82y old  Female who presents with a chief complaint of AMS (2023 09:42)    Nueorlogy being consulted for AMS  HPI:  82F w/ h/o Parkinson Disease, dementia, mood disorder (unspecified), hypothyroidism, and DLD p/w AMS. Per daughter, pt has been progressively more confused and agitated over past two months. Mentation fluctuates frequently from day to day. Periods of confusion can be a/w visual hallucinations and aggressive behavior. Daughter reports signs of cognitive and memory decline even on good days. However, pt acutely worsened over past 1-2 days. Has had worsening confusion, worsening stiffness, and even required daughter to assist w/ feeds (which pt has never required previously at baseline). Additionally, had fever at home (T max 100.4F), prompting ED evaluation. Per daughter, no reported chills, CP, palpitations, SOB, abdominal pain, n/v/d, dysuria, or LE swelling. Of note, pt recently saw neurologist (Dr. Gannon) on  for similar complaints of worsening behavioral disturbances. At the time, pt started on Seroquel 12.5mg qd.     In ED, pt febrile to 100.5F, but otherwise HD stable on vitals. WBC 8.58 and Lactate 1.4. CXR and UA unremarkable for infectious process. S/P 1L NS bolus, IV ceftriaxone, ativan 0.5mg IV x1 (for agitation). Admitted to medicine for AMS i/s/o suspected febrile illness. (2023 09:42)      PAST MEDICAL & SURGICAL HISTORY:  Hypothyroid      Parkinson disease      Dyslipidemia      Dementia      No significant past surgical history          FAMILY HISTORY:      Social History: (-) x 3    Allergies    No Known Allergies    Intolerances        MEDICATIONS  (STANDING):  carbidopa/levodopa  25/100 1 Tablet(s) Oral four times a day  carbidopa/levodopa CR 50/200 1 Tablet(s) Oral <User Schedule>  enoxaparin Injectable 40 milliGRAM(s) SubCutaneous every 24 hours  escitalopram 20 milliGRAM(s) Oral daily  levothyroxine 88 MICROGram(s) Oral daily  QUEtiapine 12.5 milliGRAM(s) Oral at bedtime  simvastatin 20 milliGRAM(s) Oral at bedtime    MEDICATIONS  (PRN):  acetaminophen     Tablet .. 650 milliGRAM(s) Oral every 6 hours PRN Temp greater or equal to 38C (100.4F), Mild Pain (1 - 3)      Review of systems:    Limited by patient by patient mental status    Vital Signs Last 24 Hrs  T(C): 36.7 (2023 21:14), Max: 36.7 (2023 21:14)  T(F): 98.1 (2023 21:14), Max: 98.1 (2023 21:14)  HR: 68 (2023 21:14) (68 - 77)  BP: 129/63 (2023 21:14) (129/63 - 158/71)  BP(mean): 87 (2023 21:14) (87 - 87)  RR: 18 (2023 21:14) (14 - 18)  SpO2: 97% (2023 21:14) (95% - 97%)    Parameters below as of 2023 21:14  Patient On (Oxygen Delivery Method): room air        Examination:  General:  Appearance is consistent with chronologic age.  No abnormal facies.  Cognitive/Language:  The patient is oriented to person AOX1 to self follows simple commands with clue  Eyes: intact EOMI   visual field is intact   Face:  Facial sensation normal V1 - 3, no facial asymmetry.    Ears/Nose/Throat:  Hearing grossly intact   Motor examination:  increased tone/rigidity range of motion intact   No tenderness, twitching, tremors or involuntary movements.  Formal Muscle Strength Testing: (MRC grade R/L) 5/5 UE; 5/5 LE.  No observable drift.  Reflexes:   3+ b/l  biceps, triceps, brachioradialis, patella and Achilles.    Sensory examination:   Intact to light touch and  pain, in all extremities  Cerebellum:   FTN/HKS intact with normal CLIFFORD in all limbs.  No dysmetria or dysdiadokinesia.    Gait: deferred      Labs:   CBC Full  -  ( 2023 07:25 )  WBC Count : 5.35 K/uL  RBC Count : 4.18 M/uL  Hemoglobin : 13.3 g/dL  Hematocrit : 39.8 %  Platelet Count - Automated : 187 K/uL  Mean Cell Volume : 95.2 fL  Mean Cell Hemoglobin : 31.8 pg  Mean Cell Hemoglobin Concentration : 33.4 g/dL  Auto Neutrophil # : 3.18 K/uL  Auto Lymphocyte # : 1.32 K/uL  Auto Monocyte # : 0.57 K/uL  Auto Eosinophil # : 0.20 K/uL  Auto Basophil # : 0.07 K/uL  Auto Neutrophil % : 59.4 %  Auto Lymphocyte % : 24.7 %  Auto Monocyte % : 10.7 %  Auto Eosinophil % : 3.7 %  Auto Basophil % : 1.3 %        141  |  103  |  10  ----------------------------<  75  3.7   |  28  |  0.6<L>    Ca    8.9      2023 07:25  Phos  2.6       Mg     2.0         TPro  6.4  /  Alb  4.5  /  TBili  0.5  /  DBili  x   /  AST  19  /  ALT  x   /  AlkPhos  58      LIVER FUNCTIONS - ( 2023 07:25 )  Alb: 4.5 g/dL / Pro: 6.4 g/dL / ALK PHOS: 58 U/L / ALT: x     / AST: 19 U/L / GGT: x             Urinalysis Basic - ( 2023 02:59 )    Color: Yellow / Appearance: Clear / S.029 / pH: x  Gluc: x / Ketone: Small  / Bili: Negative / Urobili: <2 mg/dL   Blood: x / Protein: 30 mg/dL / Nitrite: Negative   Leuk Esterase: Small / RBC: 7 /HPF / WBC 3 /HPF   Sq Epi: x / Non Sq Epi: 5 /HPF / Bacteria: Negative          Neuroimaging:  NCHCT: CT Head No Cont:   ACC: [id number] EXAM:  CT BRAIN                          PROCEDURE DATE:  2023          INTERPRETATION:  Clinical History / Reason for exam: Alteration of   consciousness    Technique: Noncontrast head CT.  Contiguous unenhanced CT axial imagesof   the head from the base to the vertex with coronal and sagittal reformats.    Comparison: CT head 10/7/19, MRI brain 1/10/19    Findings:    There is motion artifact limiting evaluation.    The ventricles and cortical sulci are within normal limits for age.    There are stable patchy hypodensities throughout the hemispheric white   matter without mass effect compatible with mild chronic microvascular   changes.    There is no acute intracranial hemorrhage, extra-axial fluid collection   or midline shift.  Gray white matter differentiation is maintained.    There is typical posterior fossa streak artifact.    Vascular calcifications are noted.    The visualized paranasal sinuses and mastoids are clear.    IMPRESSION:    Motion limited study. No definite evidence of acute intracranial   pathology. Stable exam.    --- End of Report ---            NERY CHILDRESS MD; Attending Radiologist  This document has been electronically signed. 2023  1:37PM (23 @ 13:17)      23 @ 09:37

## 2023-01-05 NOTE — PHYSICAL THERAPY INITIAL EVALUATION ADULT - AMBULATION SKILLS, REHAB EVAL
pt amb HHA with someone holding hand in front  and at time with RW with assist/needs device and assist

## 2023-01-05 NOTE — PHYSICAL THERAPY INITIAL EVALUATION ADULT - GENERAL OBSERVATIONS, REHAB EVAL
9:20-9:50. Pt encountered semifowler in bed in NAD, + rock-n-roll posey brace, + primafit (not connected, was reconnected. Melinda RN notified . Pt alert, confused, pt speaks both English and [place], did not opposed PT. 9:20-9:50. Pt encountered semifowler in bed in NAD, + rock-n-roll posey brace, + primafit (not connected, was reconnected. Perla RN notified . Pt alert, confused, restless, pt speaks both English and [place]ese, did not opposed PT.

## 2023-01-05 NOTE — CONSULT NOTE ADULT - ATTENDING COMMENTS
I have personally seen and examined this patient on 1/5.  I have fully participated in the care of this patient.  I have reviewed all pertinent clinical information, including history, physical exam, plan and note.  82F w/ h/o Parkinson Disease, dementia, mood disorder (unspecified), hypothyroidism, and DLD p/w worsening cognition for past few days. Currently patient is alert but only oriented to her name. She confabulates a lot and only follows simple commands with mimic. Acute worsening cognition. REEG reviewed by Dr Gannon and there were reported some sharp waves. Recommend VEEG for 24 hours. Will likely needs LP, Brain MRI w/wo and AED. Will follow     I have reviewed all pertinent clinical information and reviewed all relevant imaging and diagnostic studies personally.  Recommendations as above.  Agree with above assessment except as noted.

## 2023-01-05 NOTE — PHYSICAL THERAPY INITIAL EVALUATION ADULT - RANGE OF MOTION EXAMINATION, REHAB EVAL
B UE's: shoulder flex 3/4 AAROM/PROM, + rigidity noted. B LE's + tiightness noted lagging knee ext and hip ext, tighness in hip adductors, B ankle/feet inverted and PF, however PT was able to range to neutral passivly

## 2023-01-05 NOTE — PHYSICAL THERAPY INITIAL EVALUATION ADULT - PERTINENT HX OF CURRENT PROBLEM, REHAB EVAL
82F w/ h/o Parkinson Disease, dementia, mood disorder (unspecified), hypothyroidism, and DLD p/w Pt admitted for AMS. Per daughter, pt has been progressively more confused and agitated over past two months. Mentation fluctuates frequently from day to day. Periods of confusion can be a/w visual hallucinations and aggressive behavior. Daughter reports signs of cognitive and memory decline even on good days. However, pt acutely worsened over past 1-2 days. Has had worsening confusion, worsening stiffness, and even required daughter to assist w/ feeds (which pt has never required previously at baseline). Additionally, had fever at home (T max 100.4F), prompting ED evaluation. Per daughter, no reported chills, CP, palpitations, SOB, abdominal pain, n/v/d, dysuria, or LE swelling. Of note, pt recently saw neurologist (Dr. Gannon) on 12/20 for similar complaints of worsening behavioral disturbances. At the time, pt started on Seroquel 12.5mg qd.

## 2023-01-05 NOTE — PHYSICAL THERAPY INITIAL EVALUATION ADULT - IMPAIRMENTS CONTRIBUTING IMPAIRED BED MOBILITY, REHAB EVAL
+ muscle guarding noted, rigidity/impaired balance/cognition/decreased flexibility/abnormal muscle tone/impaired postural control/decreased strength

## 2023-01-05 NOTE — PHYSICAL THERAPY INITIAL EVALUATION ADULT - IMPAIRED TRANSFERS: SIT/STAND, REHAB EVAL
impaired balance/decreased flexibility/abnormal muscle tone/impaired postural control/decreased ROM/decreased strength

## 2023-01-05 NOTE — PROGRESS NOTE ADULT - SUBJECTIVE AND OBJECTIVE BOX
RODGER REYNA 82y Female  MRN#: [id number]   Hospital Day: 1d    SUBJECTIVE  Patient is a 82y old Female who presents with a chief complaint of AMS (2023 09:35)  Currently admitted to medicine with the primary diagnosis of Metabolic encephalopathy      INTERVAL HPI AND OVERNIGHT EVENTS:  Patient was examined and seen at bedside. This morning she is resting comfortably in bed and reports no issues or overnight events.    OBJECTIVE  PAST MEDICAL & SURGICAL HISTORY  Hypothyroid    Parkinson disease    Dyslipidemia    Dementia    No significant past surgical history      ALLERGIES:  No Known Allergies    MEDICATIONS:  STANDING MEDICATIONS  bacitracin   Ointment 1 Application(s) Topical daily  carbidopa/levodopa  25/100 1 Tablet(s) Oral four times a day  carbidopa/levodopa CR 50/200 1 Tablet(s) Oral <User Schedule>  enoxaparin Injectable 40 milliGRAM(s) SubCutaneous every 24 hours  escitalopram 20 milliGRAM(s) Oral daily  levothyroxine 88 MICROGram(s) Oral daily  QUEtiapine 12.5 milliGRAM(s) Oral at bedtime  simvastatin 20 milliGRAM(s) Oral at bedtime    PRN MEDICATIONS  acetaminophen     Tablet .. 650 milliGRAM(s) Oral every 6 hours PRN      VITAL SIGNS: Last 24 Hours  T(C): 36.7 (2023 21:14), Max: 36.7 (2023 21:14)  T(F): 98.1 (2023 21:14), Max: 98.1 (2023 21:14)  HR: 68 (2023 21:14) (68 - 73)  BP: 129/63 (2023 21:14) (129/63 - 130/59)  BP(mean): 87 (2023 21:14) (87 - 87)  RR: 18 (2023 21:14) (16 - 18)  SpO2: 97% (2023 21:14) (95% - 97%)    LABS:                        13.3   5.35  )-----------( 187      ( 2023 07:25 )             39.8         141  |  103  |  10  ----------------------------<  75  3.7   |  28  |  0.6<L>    Ca    8.9      2023 07:25  Phos  2.6       Mg     2.0         TPro  6.4  /  Alb  4.5  /  TBili  0.5  /  DBili  x   /  AST  19  /  ALT  <5  /  AlkPhos  58        Urinalysis Basic - ( 2023 02:59 )    Color: Yellow / Appearance: Clear / S.029 / pH: x  Gluc: x / Ketone: Small  / Bili: Negative / Urobili: <2 mg/dL   Blood: x / Protein: 30 mg/dL / Nitrite: Negative   Leuk Esterase: Small / RBC: 7 /HPF / WBC 3 /HPF   Sq Epi: x / Non Sq Epi: 5 /HPF / Bacteria: Negative        Creatine Kinase, Serum: 171 U/L (23 @ 07:25)  Creatine Kinase, Serum: 107 U/L (23 @ 15:30)      Culture - Blood (collected 2023 05:26)  Source: .Blood Blood  Preliminary Report (2023 14:02):    No growth to date.    Culture - Blood (collected 2023 05:26)  Source: .Blood Blood  Preliminary Report (2023 14:02):    No growth to date.    Culture - Urine (collected 2023 02:59)  Source: Catheterized Catheterized  Preliminary Report (2023 12:58):    50,000 - 99,000 CFU/mL Gram positive organisms      CARDIAC MARKERS ( 2023 07:25 )  x     / x     / 171 U/L / x     / x      CARDIAC MARKERS ( 2023 15:30 )  x     / x     / 107 U/L / x     / x      CARDIAC MARKERS ( 2023 02:59 )  x     / <0.01 ng/mL / x     / x     / x          RADIOLOGY:      PHYSICAL EXAM:  CONSTITUTIONAL: No acute distress, AAOx3  HEAD: Atraumatic, normocephalic  EYES: EOM intact, PERRLA, conjunctiva and sclera clear  ENT: moist mucous membranes  PULMONARY: Clear to auscultation bilaterally  CARDIOVASCULAR: Regular rate and rhythm  GASTROINTESTINAL: Soft, non-tender, non-distended; bowel sounds present  MUSCULOSKELETAL: no edema  NEUROLOGY: non-focal  SKIN: warm and dry

## 2023-01-06 LAB
-  AMPICILLIN: SIGNIFICANT CHANGE UP
-  CIPROFLOXACIN: SIGNIFICANT CHANGE UP
-  LEVOFLOXACIN: SIGNIFICANT CHANGE UP
-  NITROFURANTOIN: SIGNIFICANT CHANGE UP
-  TETRACYCLINE: SIGNIFICANT CHANGE UP
-  VANCOMYCIN: SIGNIFICANT CHANGE UP
ANION GAP SERPL CALC-SCNC: 11 MMOL/L — SIGNIFICANT CHANGE UP (ref 7–14)
BUN SERPL-MCNC: 13 MG/DL — SIGNIFICANT CHANGE UP (ref 10–20)
CALCIUM SERPL-MCNC: 8.9 MG/DL — SIGNIFICANT CHANGE UP (ref 8.4–10.5)
CHLORIDE SERPL-SCNC: 105 MMOL/L — SIGNIFICANT CHANGE UP (ref 98–110)
CO2 SERPL-SCNC: 28 MMOL/L — SIGNIFICANT CHANGE UP (ref 17–32)
CREAT SERPL-MCNC: 0.7 MG/DL — SIGNIFICANT CHANGE UP (ref 0.7–1.5)
CULTURE RESULTS: SIGNIFICANT CHANGE UP
EGFR: 86 ML/MIN/1.73M2 — SIGNIFICANT CHANGE UP
GLUCOSE SERPL-MCNC: 79 MG/DL — SIGNIFICANT CHANGE UP (ref 70–99)
HCT VFR BLD CALC: 39.4 % — SIGNIFICANT CHANGE UP (ref 37–47)
HGB BLD-MCNC: 13.6 G/DL — SIGNIFICANT CHANGE UP (ref 12–16)
MAGNESIUM SERPL-MCNC: 2.2 MG/DL — SIGNIFICANT CHANGE UP (ref 1.8–2.4)
MCHC RBC-ENTMCNC: 32.4 PG — HIGH (ref 27–31)
MCHC RBC-ENTMCNC: 34.5 G/DL — SIGNIFICANT CHANGE UP (ref 32–37)
MCV RBC AUTO: 93.8 FL — SIGNIFICANT CHANGE UP (ref 81–99)
METHOD TYPE: SIGNIFICANT CHANGE UP
NRBC # BLD: 0 /100 WBCS — SIGNIFICANT CHANGE UP (ref 0–0)
ORGANISM # SPEC MICROSCOPIC CNT: SIGNIFICANT CHANGE UP
ORGANISM # SPEC MICROSCOPIC CNT: SIGNIFICANT CHANGE UP
PLATELET # BLD AUTO: 197 K/UL — SIGNIFICANT CHANGE UP (ref 130–400)
POTASSIUM SERPL-MCNC: 3.8 MMOL/L — SIGNIFICANT CHANGE UP (ref 3.5–5)
POTASSIUM SERPL-SCNC: 3.8 MMOL/L — SIGNIFICANT CHANGE UP (ref 3.5–5)
PROCALCITONIN SERPL-MCNC: 0.04 NG/ML — SIGNIFICANT CHANGE UP (ref 0.02–0.1)
RBC # BLD: 4.2 M/UL — SIGNIFICANT CHANGE UP (ref 4.2–5.4)
RBC # FLD: 12.7 % — SIGNIFICANT CHANGE UP (ref 11.5–14.5)
SODIUM SERPL-SCNC: 144 MMOL/L — SIGNIFICANT CHANGE UP (ref 135–146)
SPECIMEN SOURCE: SIGNIFICANT CHANGE UP
WBC # BLD: 4.98 K/UL — SIGNIFICANT CHANGE UP (ref 4.8–10.8)
WBC # FLD AUTO: 4.98 K/UL — SIGNIFICANT CHANGE UP (ref 4.8–10.8)

## 2023-01-06 PROCEDURE — 95720 EEG PHY/QHP EA INCR W/VEEG: CPT

## 2023-01-06 PROCEDURE — 99232 SBSQ HOSP IP/OBS MODERATE 35: CPT

## 2023-01-06 RX ORDER — POLYETHYLENE GLYCOL 3350 17 G/17G
17 POWDER, FOR SOLUTION ORAL AT BEDTIME
Refills: 0 | Status: DISCONTINUED | OUTPATIENT
Start: 2023-01-06 | End: 2023-01-09

## 2023-01-06 RX ORDER — DIVALPROEX SODIUM 500 MG/1
250 TABLET, DELAYED RELEASE ORAL
Refills: 0 | Status: DISCONTINUED | OUTPATIENT
Start: 2023-01-06 | End: 2023-01-09

## 2023-01-06 RX ADMIN — SIMVASTATIN 20 MILLIGRAM(S): 20 TABLET, FILM COATED ORAL at 21:31

## 2023-01-06 RX ADMIN — CARBIDOPA AND LEVODOPA 1 TABLET(S): 25; 100 TABLET ORAL at 05:12

## 2023-01-06 RX ADMIN — ESCITALOPRAM OXALATE 20 MILLIGRAM(S): 10 TABLET, FILM COATED ORAL at 12:04

## 2023-01-06 RX ADMIN — QUETIAPINE FUMARATE 12.5 MILLIGRAM(S): 200 TABLET, FILM COATED ORAL at 21:31

## 2023-01-06 RX ADMIN — Medication 88 MICROGRAM(S): at 05:12

## 2023-01-06 RX ADMIN — DIVALPROEX SODIUM 250 MILLIGRAM(S): 500 TABLET, DELAYED RELEASE ORAL at 17:23

## 2023-01-06 RX ADMIN — ENOXAPARIN SODIUM 40 MILLIGRAM(S): 100 INJECTION SUBCUTANEOUS at 17:23

## 2023-01-06 RX ADMIN — CARBIDOPA AND LEVODOPA 1 TABLET(S): 25; 100 TABLET ORAL at 00:55

## 2023-01-06 RX ADMIN — Medication 1 APPLICATION(S): at 12:03

## 2023-01-06 RX ADMIN — CARBIDOPA AND LEVODOPA 1 TABLET(S): 25; 100 TABLET ORAL at 17:23

## 2023-01-06 RX ADMIN — CARBIDOPA AND LEVODOPA 1 TABLET(S): 25; 100 TABLET ORAL at 23:24

## 2023-01-06 RX ADMIN — CARBIDOPA AND LEVODOPA 1 TABLET(S): 25; 100 TABLET ORAL at 21:31

## 2023-01-06 RX ADMIN — CARBIDOPA AND LEVODOPA 1 TABLET(S): 25; 100 TABLET ORAL at 12:04

## 2023-01-06 NOTE — PROGRESS NOTE ADULT - ATTENDING COMMENTS
I have personally seen and examined this patient.  I have fully participated in the care of this patient.  I have reviewed all pertinent clinical information, including history, physical exam, plan and note. Daughter at bedside. Patient is more calm today. Likely parkinson dementia and AD. Add Depakote 250 mg BID, Continue Seroquel. Outpatient follow up with Dr Gannon.   I have reviewed all pertinent clinical information and reviewed all relevant imaging and diagnostic studies personally.  Recommendations as above.  Agree with above assessment except as noted.
Patient is an 81 y/o man with PMH of Parkinson Disease, dementia, mood disorder (unspecified), hypothyroidism and DLD was admitted for altered mental status and fever to 100.5      #  Fever - resolved   UA with small leukocyte esterase and WBC 3, moderate blood, RBC 7 - not very impressive for UTI - f/u urine and blood cultures and monitor temps  f/u RVP-negative , urine cxs- negative, blood cxs negative   CXR without gross infiltrate  WBC WNL  no SIRS or sepsis    # Altered mental status - possibly due to metabolic encephalopathy  vs worsening dementia  Head CT scan- no acute changes , s/p EEG- completed, check TSH level, reorient as needed, family involved    # Parkinson disease - continue home meds  # Dementia - on seroquel  # Continue current management for hypothyroidism and dyslipidemia  #DVT prophylaxis    DNR/DNI status    #Progress Note Handoff: Patient is pending placement to STR  Family discussion: family is aware and in agreement with pt's d/c planning to STR Disposition: STR once bed is available    Total time spent to complete patient's bedside assessment, review medical chart, discuss medical plan of care with covering medical team was more than 35 minutes with >50% of time spent face to face with patient, discussion with patient/family and/or coordination of care

## 2023-01-06 NOTE — EEG REPORT - NS EEG TEXT BOX
Epilepsy Attending Note:     RODGER REYNA    82y Female  MRN [id number]    Vital Signs Last 24 Hrs  T(C): 36 (06 Jan 2023 05:36), Max: 36.7 (05 Jan 2023 14:00)  T(F): 96.8 (06 Jan 2023 05:36), Max: 98 (05 Jan 2023 14:00)  HR: 77 (06 Jan 2023 05:36) (75 - 96)  BP: 145/70 (06 Jan 2023 05:36) (132/69 - 146/73)  BP(mean): --  RR: 18 (06 Jan 2023 05:36) (17 - 18)  SpO2: 98% (06 Jan 2023 05:36) (96% - 100%)                              13.6   4.98  )-----------( 197      ( 06 Jan 2023 06:22 )             39.4       01-06    144  |  105  |  13  ----------------------------<  79  3.8   |  28  |  0.7    Ca    8.9      06 Jan 2023 06:22  Phos  2.6     01-05  Mg     2.2     01-06    TPro  6.4  /  Alb  4.5  /  TBili  0.5  /  DBili  x   /  AST  19  /  ALT  <5  /  AlkPhos  58  01-05      MEDICATIONS  (STANDING):  bacitracin   Ointment 1 Application(s) Topical daily  carbidopa/levodopa  25/100 1 Tablet(s) Oral four times a day  carbidopa/levodopa CR 50/200 1 Tablet(s) Oral <User Schedule>  enoxaparin Injectable 40 milliGRAM(s) SubCutaneous every 24 hours  escitalopram 20 milliGRAM(s) Oral daily  levothyroxine 88 MICROGram(s) Oral daily  QUEtiapine 12.5 milliGRAM(s) Oral at bedtime  simvastatin 20 milliGRAM(s) Oral at bedtime    MEDICATIONS  (PRN):  acetaminophen     Tablet .. 650 milliGRAM(s) Oral every 6 hours PRN Temp greater or equal to 38C (100.4F), Mild Pain (1 - 3)            VEEG in the last 24 hours:    Background---continues, less than optimally organized reaching frequencies in the range of 7-8 hz , superimposed by lower range theta    Focal and generalized slowing-----1- mild to moderate generalized slowing   2-  bilateral focal slowing mainly  mid to posterior temporal with shifting asymmetry left > right    Interictal activity-----left sharp transients and one single left FT sharp wave that appear epileptiform in nature    Events----------- none    Seizures------- none    Impression:  abnormal as above    Plan - as/neurology

## 2023-01-06 NOTE — PROGRESS NOTE ADULT - SUBJECTIVE AND OBJECTIVE BOX
NEUROLOGY CONSULT    HPI:  82F w/ h/o Parkinson Disease, dementia, mood disorder (unspecified), hypothyroidism, and DLD p/w AMS. Per daughter, pt has been progressively more confused and agitated over past two months. Mentation fluctuates frequently from day to day. Periods of confusion can be a/w visual hallucinations and aggressive behavior. Daughter reports signs of cognitive and memory decline even on good days. However, pt acutely worsened over past 1-2 days. Has had worsening confusion, worsening stiffness, and even required daughter to assist w/ feeds (which pt has never required previously at baseline). Additionally, had fever at home (T max 100.4F), prompting ED evaluation. Per daughter, no reported chills, CP, palpitations, SOB, abdominal pain, n/v/d, dysuria, or LE swelling. Of note, pt recently saw neurologist (Dr. Gannon) on 12/20 for similar complaints of worsening behavioral disturbances. At the time, pt started on Seroquel 12.5mg qd.    In ED, pt febrile to 100.5F, but otherwise HD stable on vitals. WBC 8.58 and Lactate 1.4. CXR and UA unremarkable for infectious process. S/P 1L NS bolus, IV ceftriaxone, ativan 0.5mg IV x1 (for agitation). Admitted to medicine for AMS i/s/o suspected febrile illness. (04 Jan 2023 09:42)     MEDICATIONS  Home Medications:  carbidopa-levodopa 25 mg-100 mg oral tablet: 1 tab(s) orally 4 times a day (04 Jan 2023 11:39)  carbidopa-levodopa 50 mg-200 mg oral tablet, extended release: 1 tab(s) orally once a day (at bedtime) (04 Jan 2023 11:39)  escitalopram 20 mg oral tablet: 1 tab(s) orally once a day (in the morning) (04 Jan 2023 11:39)  SEROquel 25 mg oral tablet: 0.5 tab(s) orally once a day (at bedtime) (04 Jan 2023 11:39)  simvastatin 20 mg oral tablet: 1 tab(s) orally once a day (at bedtime) (04 Jan 2023 11:39)  Synthroid 88 mcg (0.088 mg) oral tablet: 1 tab(s) orally once a day (04 Jan 2023 11:39)    MEDICATIONS  (STANDING):  bacitracin   Ointment 1 Application(s) Topical daily  carbidopa/levodopa  25/100 1 Tablet(s) Oral four times a day  carbidopa/levodopa CR 50/200 1 Tablet(s) Oral <User Schedule>  divalproex  milliGRAM(s) Oral two times a day  enoxaparin Injectable 40 milliGRAM(s) SubCutaneous every 24 hours  escitalopram 20 milliGRAM(s) Oral daily  levothyroxine 88 MICROGram(s) Oral daily  QUEtiapine 12.5 milliGRAM(s) Oral at bedtime  simvastatin 20 milliGRAM(s) Oral at bedtime    MEDICATIONS  (PRN):  acetaminophen     Tablet .. 650 milliGRAM(s) Oral every 6 hours PRN Temp greater or equal to 38C (100.4F), Mild Pain (1 - 3)      FAMILY HISTORY:    SOCIAL HISTORY: negative for tobacco, alcohol, or ilicit drug use.    Allergies    No Known Allergies    Intolerances    General:  Appearance is consistent with chronologic age.  No abnormal facies.  Cognitive/Language:  The patient is oriented to person AOX2 to name and family members at the bedside.   Eyes: intact EOMI   visual field is intact   Face:  Facial sensation normal V1 - 3, no facial asymmetry.    Ears/Nose/Throat:  Hearing grossly intact   Motor examination: range of motion intact   No tenderness, twitching, tremors or involuntary movements.  Formal Muscle Strength Testing: (MRC grade R/L) 5/5 UE; 5/5 LE.  No observable drift.  Sensory examination:   Intact to light touch and  pain, in all extremities  Gait: deferred      LABS:                        13.6   4.98  )-----------( 197      ( 06 Jan 2023 06:22 )             39.4     01-06    144  |  105  |  13  ----------------------------<  79  3.8   |  28  |  0.7    Ca    8.9      06 Jan 2023 06:22  Phos  2.6     01-05  Mg     2.2     01-06    TPro  6.4  /  Alb  4.5  /  TBili  0.5  /  DBili  x   /  AST  19  /  ALT  <5  /  AlkPhos  58  01-05    Hemoglobin A1C:   Vitamin B12     CAPILLARY BLOOD GLUCOSE    Microbiology:    Culture - Blood (collected 04 Jan 2023 05:26)  Source: .Blood Blood  Preliminary Report (05 Jan 2023 14:02):    No growth to date.    Culture - Blood (collected 04 Jan 2023 05:26)  Source: .Blood Blood  Preliminary Report (05 Jan 2023 14:02):    No growth to date.    Culture - Urine (collected 04 Jan 2023 02:59)  Source: Catheterized Catheterized  Preliminary Report (05 Jan 2023 12:58):    50,000 - 99,000 CFU/mL Gram positive organisms        RADIOLOGY, EKG AND ADDITIONAL TESTS: Reviewed.      VEEG in the last 24 hours:    Background---continues, less than optimally organized reaching frequencies in the range of 7-8 hz , superimposed by lower range theta    Focal and generalized slowing-----1- mild to moderate generalized slowing   2-  bilateral focal slowing mainly  mid to posterior temporal with shifting asymmetry left > right    Interictal activity-----left sharp transients and one single left FT sharp wave that appear epileptiform in nature    Events----------- none    Seizures------- none    Impression:  abnormal as above    Plan - as/neurology

## 2023-01-06 NOTE — PROGRESS NOTE ADULT - ASSESSMENT
82 F w/ h/o Parkinson Disease, dementia, mood disorder (unspecified), hypothyroidism, and DLD p/w AMS. Admitted to medicine for AMS i/s/o suspected febrile illness vs progression of Parkinson Disease.    # Febrile Illness  # Toxic Metabolic Encephalopathy, suspected  Initially p/w AMS. Febrile to 100.5F, but no other signs of SIRS positive on admission. Infection w/u negative so far (CXR and UA unremarkable).  - f/u BCx No growth to date UC .50,000 - 99,000 CFU/mL Gram positive organisms (01.04.23 @ 02:59)   - CTH  No definite evidence of acute intracranial pathology. Stable exam.  - obtain procalcitonin and RVP  -  (r/o rhabdomyolysis; UA shows moderate blood, but only RBC 7)  - obtain rEEG generalized slowing    #Parkinson Disease  #Dementia w/ Behavioral Disturbance  #Mood Disorder, unspecified  Mentation has progressively worsened over past few weeks. Now having periods of confusion a/w visual hallucinations and aggressive behavior. Although febrile on admission, symptoms started prior to fever. If infectious w/u negative, suspect progression of PD/dementia vs polypharmacy. At baseline, ambulates w/ rolling walker. Follows w/ Dr. Gannon.  - c/w carbidopa-levodopa 25mg-100mg PO QID  - c/w carbidopa-levodopa ER 50mg-200mg PO QHS  - c/w escitalopram 20mg PO QD  - c/w quetiapine 12.5mg PO QHS  - Started on Depakote  - Neurology recommended to do MRI brain, LP   - Video EEG abnormal Interictal activity---left sharp transients and one single left FT sharp wave that appear epileptiform in nature  - Neurology on board      # Hypothyroidism  - c/w levothyroxine 88mcg PO QD  - obtain TSH and fT4    # Dyslipidemia  - c/w simvastatin 20mg PO QHS    Pending: OP f/u with Neuro and geripsych    DVT PPX: Lovenox 40mg SQ QD  GI PPX: none indicated  DIET: DASH/TLC  ACTIVITY: IAT  CODE STATUS: DNR/DNI;   DISPOSITION: From Home    
82F w/ h/o Parkinson Disease, dementia, mood disorder (unspecified), hypothyroidism, and DLD p/w AMS. Neurology consulted for AMS. On exam Patient is AOx1 (to self), follows simple commands with clues, speech is difficult to understand, mildly hypophonic and dysarthric, with frontal release signs present.  According to patient daughter this is inconsistent with patients base line. This could be secondary to possible epileptic source vs worsening underlying dementia super-imposed with hospital delirium.    On today's evaluation, patient's mental status showed improvement had recent MRI before admission     Recommendations   Continue with Depakote 250mg BID  Continue with Seroquel 12.5 qhs  Continue with Sinemet home dose.   Continue with Lexapro 20mg qd.     Thank you for the courtesy of this consult, we sign off the case. please contact us if any neurological changes or questions. 
82F w/ h/o Parkinson Disease, dementia, mood disorder (unspecified), hypothyroidism, and DLD p/w AMS. Admitted to medicine for AMS i/s/o suspected febrile illness vs progression of Parkinson Disease.    # Febrile Illness  # Toxic Metabolic Encephalopathy, suspected  Initially p/w AMS. Febrile to 100.5F, but no other signs of SIRS positive on admission. Infection w/u negative so far (CXR and UA unremarkable).  - f/u BCx No growth to date UC .50,000 - 99,000 CFU/mL Gram positive organisms (01.04.23 @ 02:59)   - CTH  No definite evidence of acute intracranial pathology. Stable exam.  - obtain procalcitonin and RVP  -  (r/o rhabdomyolysis; UA shows moderate blood, but only RBC 7)  - obtain rEEG (r/o seizure; was planned to be done as outpatient anyway)    #Parkinson Disease  #Dementia w/ Behavioral Disturbance  #Mood Disorder, unspecified  Mentation has progressively worsened over past few weeks. Now having periods of confusion a/w visual hallucinations and aggressive behavior. Although febrile on admission, symptoms started prior to fever. If infectious w/u negative, suspect progression of PD/dementia vs polypharmacy. At baseline, ambulates w/ rolling walker. Follows w/ Dr. Gannon.  - c/w carbidopa-levodopa 25mg-100mg PO QID  - c/w carbidopa-levodopa ER 50mg-200mg PO QHS  - c/w escitalopram 20mg PO QD  - c/w quetiapine 12.5mg PO QHS    #Hypothyroidism  - c/w levothyroxine 88mcg PO QD  - obtain TSH and fT4    #Dyslipidemia  - c/w simvastatin 20mg PO QHS    Pending: [facility] EEG, consent obtained, OP f/u with Neuro and geripsych    DVT PPX: Lovenox 40mg SQ QD  GI PPX: none indicated  DIET: DASH/TLC  ACTIVITY: IAT  CODE STATUS: DNR/DNI; MOLST FORM TO BE SIGNED BY ATTENDING  DISPOSITION: From Home

## 2023-01-06 NOTE — PROGRESS NOTE ADULT - SUBJECTIVE AND OBJECTIVE BOX
RODGER REYNA 82y Female  MRN#: [id number]   Hospital Day: 2d    SUBJECTIVE  Patient is a 82y old Female who presents with a chief complaint of AMS (06 Jan 2023 10:58)  Currently admitted to medicine with the primary diagnosis of Metabolic encephalopathy.      INTERVAL HPI AND OVERNIGHT EVENTS:  Patient was examined and seen at bedside. This morning she is resting comfortably in bed and reports no issues or overnight events.    OBJECTIVE  PAST MEDICAL & SURGICAL HISTORY  Hypothyroid    Parkinson disease    Dyslipidemia    Dementia    No significant past surgical history      ALLERGIES:  No Known Allergies    MEDICATIONS:  STANDING MEDICATIONS  bacitracin   Ointment 1 Application(s) Topical daily  carbidopa/levodopa  25/100 1 Tablet(s) Oral four times a day  carbidopa/levodopa CR 50/200 1 Tablet(s) Oral <User Schedule>  divalproex  milliGRAM(s) Oral two times a day  enoxaparin Injectable 40 milliGRAM(s) SubCutaneous every 24 hours  escitalopram 20 milliGRAM(s) Oral daily  levothyroxine 88 MICROGram(s) Oral daily  QUEtiapine 12.5 milliGRAM(s) Oral at bedtime  simvastatin 20 milliGRAM(s) Oral at bedtime    PRN MEDICATIONS  acetaminophen     Tablet .. 650 milliGRAM(s) Oral every 6 hours PRN      VITAL SIGNS: Last 24 Hours  T(C): 36.7 (06 Jan 2023 13:36), Max: 36.7 (06 Jan 2023 13:36)  T(F): 98 (06 Jan 2023 13:36), Max: 98 (06 Jan 2023 13:36)  HR: 74 (06 Jan 2023 13:36) (74 - 96)  BP: 160/70 (06 Jan 2023 13:36) (145/70 - 160/70)  BP(mean): --  RR: 18 (06 Jan 2023 13:36) (18 - 18)  SpO2: 98% (06 Jan 2023 05:36) (98% - 100%)    LABS:                        13.6   4.98  )-----------( 197      ( 06 Jan 2023 06:22 )             39.4     01-06    144  |  105  |  13  ----------------------------<  79  3.8   |  28  |  0.7    Ca    8.9      06 Jan 2023 06:22  Phos  2.6     01-05  Mg     2.2     01-06    TPro  6.4  /  Alb  4.5  /  TBili  0.5  /  DBili  x   /  AST  19  /  ALT  <5  /  AlkPhos  58  01-05              Culture - Blood (collected 04 Jan 2023 05:26)  Source: .Blood Blood  Preliminary Report (05 Jan 2023 14:02):    No growth to date.    Culture - Blood (collected 04 Jan 2023 05:26)  Source: .Blood Blood  Preliminary Report (05 Jan 2023 14:02):    No growth to date.    Culture - Urine (collected 04 Jan 2023 02:59)  Source: Catheterized Catheterized  Preliminary Report (05 Jan 2023 12:58):    50,000 - 99,000 CFU/mL Gram positive organisms      CARDIAC MARKERS ( 05 Jan 2023 07:25 )  x     / x     / 171 U/L / x     / x      CARDIAC MARKERS ( 04 Jan 2023 15:30 )  x     / x     / 107 U/L / x     / x          RADIOLOGY:      PHYSICAL EXAM:  CONSTITUTIONAL: No acute distress, AAOx1-2  HEAD: Atraumatic, normocephalic  EYES: EOM intact, PERRLA, conjunctiva and sclera clear  ENT: moist mucous membranes  PULMONARY: Clear to auscultation bilaterally  CARDIOVASCULAR: Regular rate and rhythm  GASTROINTESTINAL: Soft, non-tender, non-distended; bowel sounds present  MUSCULOSKELETAL: no edema  NEUROLOGY: non-focal  SKIN: warm and dry

## 2023-01-06 NOTE — PROGRESS NOTE ADULT - SUBJECTIVE AND OBJECTIVE BOX
PETERSRODGER  [facility] T2-3A 020 B ([facility] T2-3A)        Patient was evaluated and examined  by bedside, no active events over night, patient remains confused, no agitation events       REVIEW OF SYSTEMS: unable to obtain, patient is confused         T(C): , Max: 36.7 (01-05-23 @ 14:00)  HR: 77 (01-06-23 @ 05:36)  BP: 145/70 (01-06-23 @ 05:36)  RR: 18 (01-06-23 @ 05:36)  SpO2: 98% (01-06-23 @ 05:36)  CAPILLARY BLOOD GLUCOSE          PHYSICAL EXAM:  General: NAD, Awake,  patient is laying comfortably in bed  HEENT: AT, NC, Supple, NO JVD, NO CB  Lungs: CTA B/L, no wheezing, no rhonchi  CVS: normal S1, S2, RRR, NO M/G/R  Abdomen: soft, bowel sounds present, non-tender, non-distended  Extremities: no edema, no clubbing, no cyanosis, positive peripheral pulses b/l  Neuro: chronic confused state, diffuse generalized body weakness  Skin: no rash, no ecchymosis      LAB  CBC  Date: 01-06-23 @ 06:22  Mean cell Rgbqixbrsp31.4  Mean cell Hemoglobin Conc34.5  Mean cell Volum 93.8  Platelet count-Automate 197  RBC Count 4.20  Red Cell Distrib Width12.7  WBC Count4.98  % Albumin, Urine--  Hematocrit 39.4  Hemoglobin 13.6  CBC  Date: 01-05-23 @ 07:25  Mean cell Aekvdyirot18.8  Mean cell Hemoglobin Conc33.4  Mean cell Volum 95.2  Platelet count-Automate 187  RBC Count 4.18  Red Cell Distrib Width12.7  WBC Count5.35  % Albumin, Urine--  Hematocrit 39.8  Hemoglobin 13.3  CBC  Date: 01-04-23 @ 02:59  Mean cell Hasaycpyrr33.5  Mean cell Hemoglobin Conc33.7  Mean cell Volum 96.7  Platelet count-Automate 201  RBC Count 4.18  Red Cell Distrib Width12.8  WBC Count8.58  % Albumin, Urine--  Hematocrit 40.4  Hemoglobin 13.6    BMP  01-06-23 @ 06:22  Blood Gas Arterial-Calcium,Ionized--  Blood Urea Nitrogen, Serum 13 mg/dL [10 - 20]  Carbon Dioxide, Serum28 mmol/L [17 - 32]  Chloride, Xrcei748 mmol/L [98 - 110]  Creatinie, Serum0.7 mg/dL [0.7 - 1.5]  Glucose, Serum79 mg/dL [70 - 99]  Potassium, Serum3.8 mmol/L [3.5 - 5.0]  Sodium, Serum 144 mmol/L [135 - 146]  BMP  01-05-23 @ 07:25  Blood Gas Arterial-Calcium,Ionized--  Blood Urea Nitrogen, Serum 10 mg/dL [10 - 20]  Carbon Dioxide, Serum28 mmol/L [17 - 32]  Chloride, Hariw971 mmol/L [98 - 110]  Creatinie, Serum0.6 mg/dL<L> [0.7 - 1.5]  Glucose, Serum75 mg/dL [70 - 99]  Potassium, Serum3.7 mmol/L [3.5 - 5.0]  Sodium, Serum 141 mmol/L [135 - 146]  BMP  01-04-23 @ 02:59  Blood Gas Arterial-Calcium,Ionized--  Blood Urea Nitrogen, Serum 20 mg/dL [10 - 20]  Carbon Dioxide, Serum26 mmol/L [17 - 32]  Chloride, Piqqn252 mmol/L [98 - 110]  Creatinie, Serum0.8 mg/dL [0.7 - 1.5]  Glucose, Serum95 mg/dL [70 - 99]  Potassium, Serum4.7 mmol/L [3.5 - 5.0]  Sodium, Serum 142 mmol/L [135 - 146]              Microbiology:    Culture - Blood (collected 01-04-23 @ 05:26)  Source: .Blood Blood  Preliminary Report (01-05-23 @ 14:02):    No growth to date.    Culture - Blood (collected 01-04-23 @ 05:26)  Source: .Blood Blood  Preliminary Report (01-05-23 @ 14:02):    No growth to date.    Culture - Urine (collected 01-04-23 @ 02:59)  Source: Catheterized Catheterized  Preliminary Report (01-05-23 @ 12:58):    50,000 - 99,000 CFU/mL Gram positive organisms      Medications:  acetaminophen     Tablet .. 650 milliGRAM(s) Oral every 6 hours PRN  bacitracin   Ointment 1 Application(s) Topical daily  carbidopa/levodopa  25/100 1 Tablet(s) Oral four times a day  carbidopa/levodopa CR 50/200 1 Tablet(s) Oral <User Schedule>  enoxaparin Injectable 40 milliGRAM(s) SubCutaneous every 24 hours  escitalopram 20 milliGRAM(s) Oral daily  levothyroxine 88 MICROGram(s) Oral daily  QUEtiapine 12.5 milliGRAM(s) Oral at bedtime  simvastatin 20 milliGRAM(s) Oral at bedtime        Assessment and Plan:  Patient is an 81 y/o man with PMH of Parkinson Disease, dementia, mood disorder (unspecified), hypothyroidism and DLD was admitted for altered mental status and fever to 100.5      #  Fever - resolved   UA with small leukocyte esterase and WBC 3, moderate blood, RBC 7 - not very impressive for UTI - f/u urine and blood cultures and monitor temps  f/u RVP-negative , urine cxs- negative, blood cxs negative   CXR without gross infiltrate  WBC WNL  no SIRS or sepsis    # Altered mental status - possibly due to metabolic encephalopathy  vs worsening dementia  Head CT scan- no acute changes , s/p EEG- completed, no seizure activity, check TSH level, reorient as needed, family involved    # Parkinson disease - continue home meds  # Dementia - on seroquel  # Continue current management for hypothyroidism and dyslipidemia  #DVT prophylaxis    DNR/DNI status    #Progress Note Handoff: Patient is pending placement to STR  Family discussion: family is aware and in agreement with pt's d/c planning to STR Disposition: STR once bed is available    Total time spent to complete patient's bedside assessment, review medical chart, discuss medical plan of care with covering medical team was more than 35 minutes with >50% of time spent face to face with patient, discussion with patient/family and/or coordination of care .       PETRESRODGER  [facility] T2-3A 020 B ([facility] T2-3A)        Patient was evaluated and examined  by bedside, no active events over night, patient remains confused, no agitation events       REVIEW OF SYSTEMS: unable to obtain, patient is confused         T(C): , Max: 36.7 (01-05-23 @ 14:00)  HR: 77 (01-06-23 @ 05:36)  BP: 145/70 (01-06-23 @ 05:36)  RR: 18 (01-06-23 @ 05:36)  SpO2: 98% (01-06-23 @ 05:36)  CAPILLARY BLOOD GLUCOSE          PHYSICAL EXAM:  General: NAD, Awake,  patient is laying comfortably in bed  HEENT: AT, NC, Supple, NO JVD, NO CB  Lungs: CTA B/L, no wheezing, no rhonchi  CVS: normal S1, S2, RRR, NO M/G/R  Abdomen: soft, bowel sounds present, non-tender, non-distended  Extremities: no edema, no clubbing, no cyanosis, positive peripheral pulses b/l  Neuro: chronic confused state, diffuse generalized body weakness  Skin: no rash, no ecchymosis      LAB  CBC  Date: 01-06-23 @ 06:22  Mean cell Xaqyhxkqlj81.4  Mean cell Hemoglobin Conc34.5  Mean cell Volum 93.8  Platelet count-Automate 197  RBC Count 4.20  Red Cell Distrib Width12.7  WBC Count4.98  % Albumin, Urine--  Hematocrit 39.4  Hemoglobin 13.6  CBC  Date: 01-05-23 @ 07:25  Mean cell Sqjhntvyob82.8  Mean cell Hemoglobin Conc33.4  Mean cell Volum 95.2  Platelet count-Automate 187  RBC Count 4.18  Red Cell Distrib Width12.7  WBC Count5.35  % Albumin, Urine--  Hematocrit 39.8  Hemoglobin 13.3  CBC  Date: 01-04-23 @ 02:59  Mean cell Qwqbzlzpyp01.5  Mean cell Hemoglobin Conc33.7  Mean cell Volum 96.7  Platelet count-Automate 201  RBC Count 4.18  Red Cell Distrib Width12.8  WBC Count8.58  % Albumin, Urine--  Hematocrit 40.4  Hemoglobin 13.6    BMP  01-06-23 @ 06:22  Blood Gas Arterial-Calcium,Ionized--  Blood Urea Nitrogen, Serum 13 mg/dL [10 - 20]  Carbon Dioxide, Serum28 mmol/L [17 - 32]  Chloride, Ljchd842 mmol/L [98 - 110]  Creatinie, Serum0.7 mg/dL [0.7 - 1.5]  Glucose, Serum79 mg/dL [70 - 99]  Potassium, Serum3.8 mmol/L [3.5 - 5.0]  Sodium, Serum 144 mmol/L [135 - 146]  BMP  01-05-23 @ 07:25  Blood Gas Arterial-Calcium,Ionized--  Blood Urea Nitrogen, Serum 10 mg/dL [10 - 20]  Carbon Dioxide, Serum28 mmol/L [17 - 32]  Chloride, Savfv231 mmol/L [98 - 110]  Creatinie, Serum0.6 mg/dL<L> [0.7 - 1.5]  Glucose, Serum75 mg/dL [70 - 99]  Potassium, Serum3.7 mmol/L [3.5 - 5.0]  Sodium, Serum 141 mmol/L [135 - 146]  BMP  01-04-23 @ 02:59  Blood Gas Arterial-Calcium,Ionized--  Blood Urea Nitrogen, Serum 20 mg/dL [10 - 20]  Carbon Dioxide, Serum26 mmol/L [17 - 32]  Chloride, Doiik172 mmol/L [98 - 110]  Creatinie, Serum0.8 mg/dL [0.7 - 1.5]  Glucose, Serum95 mg/dL [70 - 99]  Potassium, Serum4.7 mmol/L [3.5 - 5.0]  Sodium, Serum 142 mmol/L [135 - 146]              Microbiology:    Culture - Blood (collected 01-04-23 @ 05:26)  Source: .Blood Blood  Preliminary Report (01-05-23 @ 14:02):    No growth to date.    Culture - Blood (collected 01-04-23 @ 05:26)  Source: .Blood Blood  Preliminary Report (01-05-23 @ 14:02):    No growth to date.    Culture - Urine (collected 01-04-23 @ 02:59)  Source: Catheterized Catheterized  Preliminary Report (01-05-23 @ 12:58):    50,000 - 99,000 CFU/mL Gram positive organisms      Medications:  acetaminophen     Tablet .. 650 milliGRAM(s) Oral every 6 hours PRN  bacitracin   Ointment 1 Application(s) Topical daily  carbidopa/levodopa  25/100 1 Tablet(s) Oral four times a day  carbidopa/levodopa CR 50/200 1 Tablet(s) Oral <User Schedule>  enoxaparin Injectable 40 milliGRAM(s) SubCutaneous every 24 hours  escitalopram 20 milliGRAM(s) Oral daily  levothyroxine 88 MICROGram(s) Oral daily  QUEtiapine 12.5 milliGRAM(s) Oral at bedtime  simvastatin 20 milliGRAM(s) Oral at bedtime        Assessment and Plan:  Patient is an 83 y/o man with PMH of Parkinson Disease, dementia, mood disorder (unspecified), hypothyroidism and DLD was admitted for altered mental status and fever to 100.5      #  Fever - resolved   UA with small leukocyte esterase and WBC 3, moderate blood, RBC 7 - not very impressive for UTI - f/u urine and blood cultures and monitor temps  f/u RVP-negative , urine cxs- negative, blood cxs negative   CXR without gross infiltrate  WBC WNL  no SIRS or sepsis    # Altered mental status - possibly due to metabolic encephalopathy  vs worsening dementia  Head CT scan- no acute changes , s/p routine EEG- completed, no seizure activity, s/p video EEG- abnormal- rec. by Neuro to start Depakote tx.  -outpatient Neuro f/up   - normal TSH level, reorient as needed, family involved    # Parkinson disease - continue home meds  # Dementia - on seroquel  # Continue current management for hypothyroidism and dyslipidemia  #DVT prophylaxis    DNR/DNI status    #Progress Note Handoff: Patient is pending placement to STR  Family discussion: family is aware and in agreement with pt's d/c planning to STR Disposition: STR once bed is available    Total time spent to complete patient's bedside assessment, review medical chart, discuss medical plan of care with covering medical team was more than 35 minutes with >50% of time spent face to face with patient, discussion with patient/family and/or coordination of care .

## 2023-01-07 ENCOUNTER — TRANSCRIPTION ENCOUNTER (OUTPATIENT)
Age: 83
End: 2023-01-07

## 2023-01-07 PROCEDURE — 99232 SBSQ HOSP IP/OBS MODERATE 35: CPT

## 2023-01-07 RX ORDER — DIVALPROEX SODIUM 500 MG/1
1 TABLET, DELAYED RELEASE ORAL
Qty: 0 | Refills: 0 | DISCHARGE
Start: 2023-01-07

## 2023-01-07 RX ADMIN — SIMVASTATIN 20 MILLIGRAM(S): 20 TABLET, FILM COATED ORAL at 21:10

## 2023-01-07 RX ADMIN — DIVALPROEX SODIUM 250 MILLIGRAM(S): 500 TABLET, DELAYED RELEASE ORAL at 17:20

## 2023-01-07 RX ADMIN — CARBIDOPA AND LEVODOPA 1 TABLET(S): 25; 100 TABLET ORAL at 11:18

## 2023-01-07 RX ADMIN — Medication 1 APPLICATION(S): at 11:19

## 2023-01-07 RX ADMIN — CARBIDOPA AND LEVODOPA 1 TABLET(S): 25; 100 TABLET ORAL at 17:20

## 2023-01-07 RX ADMIN — CARBIDOPA AND LEVODOPA 1 TABLET(S): 25; 100 TABLET ORAL at 21:10

## 2023-01-07 RX ADMIN — Medication 88 MICROGRAM(S): at 05:07

## 2023-01-07 RX ADMIN — DIVALPROEX SODIUM 250 MILLIGRAM(S): 500 TABLET, DELAYED RELEASE ORAL at 05:08

## 2023-01-07 RX ADMIN — ESCITALOPRAM OXALATE 20 MILLIGRAM(S): 10 TABLET, FILM COATED ORAL at 11:19

## 2023-01-07 RX ADMIN — ENOXAPARIN SODIUM 40 MILLIGRAM(S): 100 INJECTION SUBCUTANEOUS at 17:20

## 2023-01-07 RX ADMIN — QUETIAPINE FUMARATE 12.5 MILLIGRAM(S): 200 TABLET, FILM COATED ORAL at 21:10

## 2023-01-07 RX ADMIN — CARBIDOPA AND LEVODOPA 1 TABLET(S): 25; 100 TABLET ORAL at 05:07

## 2023-01-07 NOTE — PROGRESS NOTE ADULT - SUBJECTIVE AND OBJECTIVE BOX
[facility]  [facility] T2-3A 020 B ([facility] T2-3A)      Patient was evaluated and examined  by bedside, no active events over night time         REVIEW OF SYSTEMS: unable to obtain, patient is confused       T(C): , Max: 36.7 (01-06-23 @ 13:36)  HR: 61 (01-07-23 @ 04:30)  BP: 148/70 (01-07-23 @ 04:30)  RR: 18 (01-07-23 @ 04:30)  SpO2: 98% (01-07-23 @ 04:30)  CAPILLARY BLOOD GLUCOSE      PHYSICAL EXAM:  General: NAD, Awake,  patient is laying comfortably in bed  HEENT: AT, NC, Supple, NO JVD, NO CB  Lungs: CTA B/L, no wheezing, no rhonchi  CVS: normal S1, S2, RRR, NO M/G/R  Abdomen: soft, bowel sounds present, non-tender, non-distended  Extremities: no edema, no clubbing, no cyanosis, positive peripheral pulses b/l  Neuro: chronic confused state, diffuse generalized body weakness  Skin: no rash, no ecchymosis        LAB  CBC  Date: 01-06-23 @ 06:22  Mean cell Rzwawzgtkl57.4  Mean cell Hemoglobin Conc34.5  Mean cell Volum 93.8  Platelet count-Automate 197  RBC Count 4.20  Red Cell Distrib Width12.7  WBC Count4.98  % Albumin, Urine--  Hematocrit 39.4  Hemoglobin 13.6  CBC  Date: 01-05-23 @ 07:25  Mean cell Slrkizanne34.8  Mean cell Hemoglobin Conc33.4  Mean cell Volum 95.2  Platelet count-Automate 187  RBC Count 4.18  Red Cell Distrib Width12.7  WBC Count5.35  % Albumin, Urine--  Hematocrit 39.8  Hemoglobin 13.3  CBC  Date: 01-04-23 @ 02:59  Mean cell Tjefmldqsp81.5  Mean cell Hemoglobin Conc33.7  Mean cell Volum 96.7  Platelet count-Automate 201  RBC Count 4.18  Red Cell Distrib Width12.8  WBC Count8.58  % Albumin, Urine--  Hematocrit 40.4  Hemoglobin 13.6    BMP  01-06-23 @ 06:22  Blood Gas Arterial-Calcium,Ionized--  Blood Urea Nitrogen, Serum 13 mg/dL [10 - 20]  Carbon Dioxide, Serum28 mmol/L [17 - 32]  Chloride, Antgy890 mmol/L [98 - 110]  Creatinie, Serum0.7 mg/dL [0.7 - 1.5]  Glucose, Serum79 mg/dL [70 - 99]  Potassium, Serum3.8 mmol/L [3.5 - 5.0]  Sodium, Serum 144 mmol/L [135 - 146]  BMP  01-05-23 @ 07:25  Blood Gas Arterial-Calcium,Ionized--  Blood Urea Nitrogen, Serum 10 mg/dL [10 - 20]  Carbon Dioxide, Serum28 mmol/L [17 - 32]  Chloride, Mfixt658 mmol/L [98 - 110]  Creatinie, Serum0.6 mg/dL<L> [0.7 - 1.5]  Glucose, Serum75 mg/dL [70 - 99]  Potassium, Serum3.7 mmol/L [3.5 - 5.0]  Sodium, Serum 141 mmol/L [135 - 146]  BMP  01-04-23 @ 02:59  Blood Gas Arterial-Calcium,Ionized--  Blood Urea Nitrogen, Serum 20 mg/dL [10 - 20]  Carbon Dioxide, Serum26 mmol/L [17 - 32]  Chloride, Unbch850 mmol/L [98 - 110]  Creatinie, Serum0.8 mg/dL [0.7 - 1.5]  Glucose, Serum95 mg/dL [70 - 99]  Potassium, Serum4.7 mmol/L [3.5 - 5.0]  Sodium, Serum 142 mmol/L [135 - 146]              Microbiology:    Culture - Blood (collected 01-04-23 @ 05:26)  Source: .Blood Blood  Preliminary Report (01-05-23 @ 14:02):    No growth to date.    Culture - Blood (collected 01-04-23 @ 05:26)  Source: .Blood Blood  Preliminary Report (01-05-23 @ 14:02):    No growth to date.    Culture - Urine (collected 01-04-23 @ 02:59)  Source: Catheterized Catheterized  Final Report (01-06-23 @ 19:26):    50,000 - 99,000 CFU/mL Enterococcus faecalis    <10,000 CFU/ml Normal Urogenital adam present  Organism: Enterococcus faecalis (01-06-23 @ 19:26)  Organism: Enterococcus faecalis (01-06-23 @ 19:26)      -  Ampicillin: S <=2 Predicts results to ampicillin/sulbactam, amoxacillin-clavulanate and  piperacillin-tazobactam.      -  Ciprofloxacin: S <=1      -  Levofloxacin: S <=1      -  Nitrofurantoin: S <=32 Should not be used to treat pyelonephritis.      -  Tetracycline: R >8      -  Vancomycin: S 2      Method Type: EULALIO        Medications:  acetaminophen     Tablet .. 650 milliGRAM(s) Oral every 6 hours PRN  bacitracin   Ointment 1 Application(s) Topical daily  carbidopa/levodopa  25/100 1 Tablet(s) Oral four times a day  carbidopa/levodopa CR 50/200 1 Tablet(s) Oral <User Schedule>  divalproex  milliGRAM(s) Oral two times a day  enoxaparin Injectable 40 milliGRAM(s) SubCutaneous every 24 hours  escitalopram 20 milliGRAM(s) Oral daily  levothyroxine 88 MICROGram(s) Oral daily  polyethylene glycol 3350 17 Gram(s) Oral at bedtime PRN  QUEtiapine 12.5 milliGRAM(s) Oral at bedtime  simvastatin 20 milliGRAM(s) Oral at bedtime        Assessment and Plan:  Patient is an 81 y/o man with PMH of Parkinson Disease, dementia, mood disorder (unspecified), hypothyroidism and DLD was admitted for altered mental status and fever to 100.5      #  Fever - resolved   UA with small leukocyte esterase and WBC 3, moderate blood, RBC 7 - not very impressive for UTI - urine cxs- enterococcus due to contamination    -blood cultures no growth  f/u RVP-negative , urine cxs- negative, blood cxs negative   CXR without gross infiltrate  WBC WNL  no SIRS or sepsis    # Altered mental status - possibly due to metabolic encephalopathy  vs worsening dementia  Head CT scan- no acute changes , s/p routine EEG- completed, no seizure activity, s/p video EEG- abnormal- rec. by Neuro to start Depakote tx.  -outpatient Neuro f/up   - normal TSH level, reorient as needed, family involved    # Parkinson disease - continue home meds  # Dementia - on seroquel  # Continue current management for hypothyroidism and dyslipidemia  #DVT prophylaxis    DNR/DNI status    #Progress Note Handoff: Patient is pending placement to STR  Family discussion: family is aware and in agreement with pt's d/c planning to STR Disposition: STR once bed is available    Total time spent to complete patient's bedside assessment, review medical chart, discuss medical plan of care with covering medical team was more than 35 minutes with >50% of time spent face to face with patient, discussion with patient/family and/or coordination of care .

## 2023-01-07 NOTE — DISCHARGE NOTE PROVIDER - HOSPITAL COURSE
82 F w/ h/o Parkinson Disease, dementia, mood disorder (unspecified), hypothyroidism, and DLD p/w AMS. Admitted to medicine for AMS i/s/o suspected febrile illness vs progression of Parkinson Disease.    You presented to the hospital with fever and AMS. We did labs which didn't show any signs of infection. You have been treated with Parkinson disease. Neurology saw you for abnormal EEG result. They added a new medication to your regimen for seizure control.    # Febrile Illness resolved  # Toxic Metabolic Encephalopathy, suspected  - f/u BCx No growth to date UC .50,000 - 99,000 CFU/mL Gram positive organisms (01.04.23 @ 02:59)   - CTH  No definite evidence of acute intracranial pathology. Stable exam.  - obtain procalcitonin and RVP  -  (r/o rhabdomyolysis; UA shows moderate blood, but only RBC 7)  - obtain rEEG generalized slowing    #Parkinson Disease  #Dementia w/ Behavioral Disturbance  #Mood Disorder, unspecified  Mentation has progressively worsened over past few weeks. Now having periods of confusion a/w visual hallucinations and aggressive behavior. Although febrile on admission, symptoms started prior to fever. If infectious w/u negative, suspect progression of PD/dementia vs polypharmacy. At baseline, ambulates w/ rolling walker. Follows w/ Dr. Gannon.  - c/w carbidopa-levodopa 25mg-100mg PO QID  - c/w carbidopa-levodopa ER 50mg-200mg PO QHS  - c/w escitalopram 20mg PO QD  - c/w quetiapine 12.5mg PO QHS  - Started on Depakote  - Video EEG abnormal Interictal activity---left sharp transients and one single left FT sharp wave that appear epileptiform in nature    # Hypothyroidism  - c/w levothyroxine 88mcg PO QD  - obtain TSH and fT4    # Dyslipidemia  - c/w simvastatin 20mg PO QHS    Pending: OP f/u with Neuro and geripsych     Patient is an 81 y/o man with PMH of Parkinson Disease, dementia, mood disorder (unspecified), hypothyroidism and DLD was admitted for altered mental status and fever to 100.5      #  Fever - resolved   UA with small leukocyte esterase and WBC 3, moderate blood, RBC 7 - not very impressive for UTI - urine cxs- enterococcus due to contamination    -blood cultures no growth  f/u RVP-negative , urine cxs- negative, blood cxs negative   CXR without gross infiltrate  WBC WNL  no SIRS or sepsis    # Altered mental status - possibly due to metabolic encephalopathy  vs worsening dementia  Head CT scan- no acute changes , s/p routine EEG- completed, no seizure activity, s/p video EEG- abnormal- rec. by Neuro to start Depakote tx.  -outpatient Neuro f/up   - normal TSH level, reorient as needed, family involved    # Parkinson disease - continue home meds  # Dementia - on seroquel  # Continue current management for hypothyroidism and dyslipidemia  #DVT prophylaxis    DNR/DNI status

## 2023-01-07 NOTE — DISCHARGE NOTE PROVIDER - CARE PROVIDER_API CALL
Ace Womack)  Neurology  [address] [place] [address]  Phone: [phone]  Fax: [phone]  Follow Up Time: 2 weeks    Darío Garduno)  Internal Medicine  [address], [address] [place] [address]  Phone: [phone]  Fax: [phone]  Follow Up Time: 2 weeks

## 2023-01-07 NOTE — DISCHARGE NOTE PROVIDER - NSDCCPCAREPLAN_GEN_ALL_CORE_FT
PRINCIPAL DISCHARGE DIAGNOSIS  Diagnosis: Metabolic encephalopathy  Assessment and Plan of Treatment: Dementia is a condition that causes loss of memory, thought control, and judgment. Alzheimer disease is the most common cause of dementia. Other common causes are loss of blood flow or nerve damage in the brain, and long-term alcohol or drug use. Dementia cannot be cured or prevented, but treatment may slow or reduce your symptoms.   DISCHARGE INSTRUCTIONS:  Return to the emergency department if you or someone close to you notices:   - You have signs of delirium, such as extreme confusion, and seeing or hearing things that are not there.  - You become angry or violent, and cannot be calmed down.  - You faint and cannot be woken.   - Contact your healthcare provider if you or someone close to you notices:   - You have a fever.  - You have increased confusion, behavior, or mood changes.  - You have questions or concerns about your condition or care.  Antianxiety medicine may be used to help reduce anxiety and keep you calm. Antipsychotics may be used to help control any anger or violence. Antidepressants may be used to help improve your mood and reduce your symptoms of depression.  Take your medicine as directed. Contact your healthcare provider if you think your medicine is not helping or if you have side effects. Tell him of her if you are allergic to any medicine. Keep a list of the medicines, vitamins, and herbs you take. Include the amounts, and when and why you take them. Bring the list or the pill bottles to follow-up visits. Carry your medicine list with you in case of an emergency.  Follow up with your healthcare provider as directed: Write down your questions so you remember to ask them during your visits. Ask someone to go in with you if you have trouble understanding or remembering what your healthcare provider tells you. The person can take notes for you during the visit and go over the notes with you later.   Manage your dementia: You may begin to need an in-home aide to help you remember your daily tasks. Ask your healthcare provider for a list of organizations      SECONDARY DISCHARGE DIAGNOSES  Diagnosis: Febrile illness  Assessment and Plan of Treatment:

## 2023-01-07 NOTE — DISCHARGE NOTE PROVIDER - NSDCMRMEDTOKEN_GEN_ALL_CORE_FT
carbidopa-levodopa 25 mg-100 mg oral tablet: 1 tab(s) orally 4 times a day  carbidopa-levodopa 50 mg-200 mg oral tablet, extended release: 1 tab(s) orally once a day (at bedtime)  divalproex sodium 250 mg oral delayed release tablet: 1 tab(s) orally 2 times a day  escitalopram 20 mg oral tablet: 1 tab(s) orally once a day (in the morning)  SEROquel 25 mg oral tablet: 0.5 tab(s) orally once a day (at bedtime)  simvastatin 20 mg oral tablet: 1 tab(s) orally once a day (at bedtime)  Synthroid 88 mcg (0.088 mg) oral tablet: 1 tab(s) orally once a day

## 2023-01-07 NOTE — DISCHARGE NOTE PROVIDER - PROVIDER TOKENS
PROVIDER:[TOKEN:[[id number]],FOLLOWUP:[2 weeks]],PROVIDER:[TOKEN:[[id number]],FOLLOWUP:[2 weeks]]

## 2023-01-08 PROCEDURE — 99232 SBSQ HOSP IP/OBS MODERATE 35: CPT

## 2023-01-08 RX ADMIN — CARBIDOPA AND LEVODOPA 1 TABLET(S): 25; 100 TABLET ORAL at 05:29

## 2023-01-08 RX ADMIN — SIMVASTATIN 20 MILLIGRAM(S): 20 TABLET, FILM COATED ORAL at 22:57

## 2023-01-08 RX ADMIN — ESCITALOPRAM OXALATE 20 MILLIGRAM(S): 10 TABLET, FILM COATED ORAL at 11:14

## 2023-01-08 RX ADMIN — CARBIDOPA AND LEVODOPA 1 TABLET(S): 25; 100 TABLET ORAL at 23:01

## 2023-01-08 RX ADMIN — CARBIDOPA AND LEVODOPA 1 TABLET(S): 25; 100 TABLET ORAL at 11:26

## 2023-01-08 RX ADMIN — CARBIDOPA AND LEVODOPA 1 TABLET(S): 25; 100 TABLET ORAL at 17:54

## 2023-01-08 RX ADMIN — QUETIAPINE FUMARATE 12.5 MILLIGRAM(S): 200 TABLET, FILM COATED ORAL at 22:57

## 2023-01-08 RX ADMIN — CARBIDOPA AND LEVODOPA 1 TABLET(S): 25; 100 TABLET ORAL at 22:57

## 2023-01-08 RX ADMIN — ENOXAPARIN SODIUM 40 MILLIGRAM(S): 100 INJECTION SUBCUTANEOUS at 17:54

## 2023-01-08 RX ADMIN — DIVALPROEX SODIUM 250 MILLIGRAM(S): 500 TABLET, DELAYED RELEASE ORAL at 05:29

## 2023-01-08 RX ADMIN — Medication 88 MICROGRAM(S): at 05:29

## 2023-01-08 RX ADMIN — Medication 1 APPLICATION(S): at 11:25

## 2023-01-08 RX ADMIN — DIVALPROEX SODIUM 250 MILLIGRAM(S): 500 TABLET, DELAYED RELEASE ORAL at 17:53

## 2023-01-08 NOTE — PROGRESS NOTE ADULT - SUBJECTIVE AND OBJECTIVE BOX
PETERSBRENDONRODGER  [facility] T2-3A 020 B ([facility] T2-3A)        Patient was evaluated and examined  by bedside, no active events over night time as per covering nurse report        REVIEW OF SYSTEMS: unable to obtain, patient remains confused        T(C): , Max: 36.7 (01-08-23 @ 05:00)  HR: 70 (01-08-23 @ 05:00)  BP: 131/61 (01-08-23 @ 05:00)  RR: 16 (01-08-23 @ 05:00)  SpO2: 97% (01-07-23 @ 20:24)  CAPILLARY BLOOD GLUCOSE          PHYSICAL EXAM:  General: NAD, Awake,  patient is laying comfortably in bed  HEENT: AT, NC, Supple, NO JVD, NO CB  Lungs: CTA B/L, no wheezing, no rhonchi  CVS: normal S1, S2, RRR, NO M/G/R  Abdomen: soft, bowel sounds present, non-tender, non-distended  Extremities: no edema, no clubbing, no cyanosis, positive peripheral pulses b/l  Neuro: chronic confused state, diffuse generalized body weakness  Skin: no rash, no ecchymosis          LAB  CBC  Date: 01-06-23 @ 06:22  Mean cell Lkfhefmnrt51.4  Mean cell Hemoglobin Conc34.5  Mean cell Volum 93.8  Platelet count-Automate 197  RBC Count 4.20  Red Cell Distrib Width12.7  WBC Count4.98  % Albumin, Urine--  Hematocrit 39.4  Hemoglobin 13.6  CBC  Date: 01-05-23 @ 07:25  Mean cell Fdehliogfo22.8  Mean cell Hemoglobin Conc33.4  Mean cell Volum 95.2  Platelet count-Automate 187  RBC Count 4.18  Red Cell Distrib Width12.7  WBC Count5.35  % Albumin, Urine--  Hematocrit 39.8  Hemoglobin 13.3  CBC  Date: 01-04-23 @ 02:59  Mean cell Kctyhcsbaw98.5  Mean cell Hemoglobin Conc33.7  Mean cell Volum 96.7  Platelet count-Automate 201  RBC Count 4.18  Red Cell Distrib Width12.8  WBC Count8.58  % Albumin, Urine--  Hematocrit 40.4  Hemoglobin 13.6    BMP  01-06-23 @ 06:22  Blood Gas Arterial-Calcium,Ionized--  Blood Urea Nitrogen, Serum 13 mg/dL [10 - 20]  Carbon Dioxide, Serum28 mmol/L [17 - 32]  Chloride, Lygwa282 mmol/L [98 - 110]  Creatinie, Serum0.7 mg/dL [0.7 - 1.5]  Glucose, Serum79 mg/dL [70 - 99]  Potassium, Serum3.8 mmol/L [3.5 - 5.0]  Sodium, Serum 144 mmol/L [135 - 146]  BMP  01-05-23 @ 07:25  Blood Gas Arterial-Calcium,Ionized--  Blood Urea Nitrogen, Serum 10 mg/dL [10 - 20]  Carbon Dioxide, Serum28 mmol/L [17 - 32]  Chloride, Xptnt079 mmol/L [98 - 110]  Creatinie, Serum0.6 mg/dL<L> [0.7 - 1.5]  Glucose, Serum75 mg/dL [70 - 99]  Potassium, Serum3.7 mmol/L [3.5 - 5.0]  Sodium, Serum 141 mmol/L [135 - 146]  BMP  01-04-23 @ 02:59  Blood Gas Arterial-Calcium,Ionized--  Blood Urea Nitrogen, Serum 20 mg/dL [10 - 20]  Carbon Dioxide, Serum26 mmol/L [17 - 32]  Chloride, Mftdr900 mmol/L [98 - 110]  Creatinie, Serum0.8 mg/dL [0.7 - 1.5]  Glucose, Serum95 mg/dL [70 - 99]  Potassium, Serum4.7 mmol/L [3.5 - 5.0]  Sodium, Serum 142 mmol/L [135 - 146]              Microbiology:    Culture - Blood (collected 01-04-23 @ 05:26)  Source: .Blood Blood  Preliminary Report (01-05-23 @ 14:02):    No growth to date.    Culture - Blood (collected 01-04-23 @ 05:26)  Source: .Blood Blood  Preliminary Report (01-05-23 @ 14:02):    No growth to date.    Culture - Urine (collected 01-04-23 @ 02:59)  Source: Catheterized Catheterized  Final Report (01-06-23 @ 19:26):    50,000 - 99,000 CFU/mL Enterococcus faecalis    <10,000 CFU/ml Normal Urogenital adam present  Organism: Enterococcus faecalis (01-06-23 @ 19:26)  Organism: Enterococcus faecalis (01-06-23 @ 19:26)      -  Ampicillin: S <=2 Predicts results to ampicillin/sulbactam, amoxacillin-clavulanate and  piperacillin-tazobactam.      -  Ciprofloxacin: S <=1      -  Levofloxacin: S <=1      -  Nitrofurantoin: S <=32 Should not be used to treat pyelonephritis.      -  Tetracycline: R >8      -  Vancomycin: S 2      Method Type: EULALIO        Medications:  acetaminophen     Tablet .. 650 milliGRAM(s) Oral every 6 hours PRN  bacitracin   Ointment 1 Application(s) Topical daily  carbidopa/levodopa  25/100 1 Tablet(s) Oral four times a day  carbidopa/levodopa CR 50/200 1 Tablet(s) Oral <User Schedule>  divalproex  milliGRAM(s) Oral two times a day  enoxaparin Injectable 40 milliGRAM(s) SubCutaneous every 24 hours  escitalopram 20 milliGRAM(s) Oral daily  levothyroxine 88 MICROGram(s) Oral daily  polyethylene glycol 3350 17 Gram(s) Oral at bedtime PRN  QUEtiapine 12.5 milliGRAM(s) Oral at bedtime  simvastatin 20 milliGRAM(s) Oral at bedtime        Assessment and Plan:  Patient is an 83 y/o man with PMH of Parkinson Disease, dementia, mood disorder (unspecified), hypothyroidism and DLD was admitted for altered mental status and fever to 100.5      #  Fever - resolved   UA with small leukocyte esterase and WBC 3, moderate blood, RBC 7 - not very impressive for UTI - urine cxs- enterococcus due to contamination    -blood cultures no growth  f/u RVP-negative , urine cxs- negative, blood cxs negative   CXR without gross infiltrate  WBC WNL  no SIRS or sepsis    # Altered mental status - possibly due to metabolic encephalopathy  vs worsening dementia  Head CT scan- no acute changes , s/p routine EEG- completed, no seizure activity, s/p video EEG- abnormal- rec. by Neuro to start Depakote tx.  -outpatient Neuro f/up   - normal TSH level, reorient as needed, family involved    # Parkinson disease - continue home meds  # Dementia - on seroquel  # Continue current management for hypothyroidism and dyslipidemia  #DVT prophylaxis    DNR/DNI status    #Progress Note Handoff: Patient is pending placement to STR  Family discussion: family is aware and in agreement with pt's d/c planning to STR Disposition: STR once bed is available    Total time spent to complete patient's bedside assessment, review medical chart, discuss medical plan of care with covering medical team was more than 25 minutes with >50% of time spent face to face with patient, discussion with patient/family and/or coordination of care .

## 2023-01-09 ENCOUNTER — APPOINTMENT (OUTPATIENT)
Dept: UROGYNECOLOGY | Facility: CLINIC | Age: 83
End: 2023-01-09

## 2023-01-09 ENCOUNTER — TRANSCRIPTION ENCOUNTER (OUTPATIENT)
Age: 83
End: 2023-01-09

## 2023-01-09 VITALS
DIASTOLIC BLOOD PRESSURE: 85 MMHG | RESPIRATION RATE: 18 BRPM | TEMPERATURE: 96 F | HEART RATE: 77 BPM | SYSTOLIC BLOOD PRESSURE: 172 MMHG

## 2023-01-09 LAB
CULTURE RESULTS: SIGNIFICANT CHANGE UP
CULTURE RESULTS: SIGNIFICANT CHANGE UP
SPECIMEN SOURCE: SIGNIFICANT CHANGE UP
SPECIMEN SOURCE: SIGNIFICANT CHANGE UP

## 2023-01-09 PROCEDURE — 99239 HOSP IP/OBS DSCHRG MGMT >30: CPT

## 2023-01-09 RX ADMIN — CARBIDOPA AND LEVODOPA 1 TABLET(S): 25; 100 TABLET ORAL at 05:40

## 2023-01-09 RX ADMIN — ESCITALOPRAM OXALATE 20 MILLIGRAM(S): 10 TABLET, FILM COATED ORAL at 11:25

## 2023-01-09 RX ADMIN — Medication 1 APPLICATION(S): at 11:25

## 2023-01-09 RX ADMIN — CARBIDOPA AND LEVODOPA 1 TABLET(S): 25; 100 TABLET ORAL at 11:25

## 2023-01-09 RX ADMIN — DIVALPROEX SODIUM 250 MILLIGRAM(S): 500 TABLET, DELAYED RELEASE ORAL at 05:40

## 2023-01-09 RX ADMIN — Medication 88 MICROGRAM(S): at 05:40

## 2023-01-09 NOTE — DISCHARGE NOTE NURSING/CASE MANAGEMENT/SOCIAL WORK - NSDCVIVACCINE_GEN_ALL_CORE_FT
influenza, injectable, quadrivalent, preservative free; 12-Oct-2019 11:19; Sheila Costa (RN); [employer]; 3bs44 (Exp. Date: 30-Jun-2020); IntraMuscular; Deltoid Right.; 0.5 milliLiter(s); VIS (VIS Published: 15-Aug-2019, VIS Presented: 12-Oct-2019);   Tdap; 07-Oct-2019 01:19; Rosa Poole (ROSALES); [employer]; [id number] (Exp. Date: 22-Oct-2021); IntraMuscular; Deltoid Right.; 0.5 milliLiter(s); VIS (VIS Published: 09-May-2013, VIS Presented: 07-Oct-2019);

## 2023-01-09 NOTE — DISCHARGE NOTE NURSING/CASE MANAGEMENT/SOCIAL WORK - NSDCPEFALRISK_GEN_ALL_CORE
For information on Fall & Injury Prevention, visit: [email or web address] OR  [email or web address] OR  https://www.cdc.gov/steadi/patient.html

## 2023-01-09 NOTE — DISCHARGE NOTE NURSING/CASE MANAGEMENT/SOCIAL WORK - PATIENT PORTAL LINK FT
You can access the FollowMyHealth Patient Portal offered by [facility] by registering at the following website: [email or web address]. By joining [employer] [facility]’s FollowMyHealth portal, you will also be able to view your health information using other applications (apps) compatible with our system.

## 2023-01-09 NOTE — ED ADULT NURSE NOTE - CHIEF COMPLAINT
[FreeTextEntry1] : Hoarse, breathy, high pitched.  MPT 14 sec, adequate projection.  strong cough.  Pt drank water and ate a banana without issue. [Normal] : no abnormal secretions The patient is a 82y Female complaining of altered mental status.

## 2023-01-09 NOTE — PROGRESS NOTE ADULT - SUBJECTIVE AND OBJECTIVE BOX
PETERSBRENDONRODGER  [facility] 020 B ([facility] [place])      Patient was evaluated and examined  by bedside, remains confused, no active events over night as per covering nurse report      REVIEW OF SYSTEMS: unable to obtain, patient remains confused         T(C): , Max: 36.4 (01-08-23 @ 14:00)  HR: 67 (01-09-23 @ 05:00)  BP: 138/65 (01-09-23 @ 05:00)  RR: 20 (01-09-23 @ 05:00)  SpO2: 96% (01-08-23 @ 19:00)  CAPILLARY BLOOD GLUCOSE          PHYSICAL EXAM:  General: NAD, Awake,  patient is laying comfortably in bed  HEENT: AT, NC, Supple, NO JVD, NO CB  Lungs: CTA B/L, no wheezing, no rhonchi  CVS: normal S1, S2, RRR, NO M/G/R  Abdomen: soft, bowel sounds present, non-tender, non-distended  Extremities: no edema, no clubbing, no cyanosis, positive peripheral pulses b/l  Neuro: chronic confused state, diffuse generalized body weakness  Skin: no rash, no ecchymosis        LAB  CBC  Date: 01-06-23 @ 06:22  Mean cell Coihpplqyo68.4  Mean cell Hemoglobin Conc34.5  Mean cell Volum 93.8  Platelet count-Automate 197  RBC Count 4.20  Red Cell Distrib Width12.7  WBC Count4.98  % Albumin, Urine--  Hematocrit 39.4  Hemoglobin 13.6  CBC  Date: 01-05-23 @ 07:25  Mean cell Ffgwmsyunt72.8  Mean cell Hemoglobin Conc33.4  Mean cell Volum 95.2  Platelet count-Automate 187  RBC Count 4.18  Red Cell Distrib Width12.7  WBC Count5.35  % Albumin, Urine--  Hematocrit 39.8  Hemoglobin 13.3  CBC  Date: 01-04-23 @ 02:59  Mean cell Slmaayzlnd28.5  Mean cell Hemoglobin Conc33.7  Mean cell Volum 96.7  Platelet count-Automate 201  RBC Count 4.18  Red Cell Distrib Width12.8  WBC Count8.58  % Albumin, Urine--  Hematocrit 40.4  Hemoglobin 13.6    BMP  01-06-23 @ 06:22  Blood Gas Arterial-Calcium,Ionized--  Blood Urea Nitrogen, Serum 13 mg/dL [10 - 20]  Carbon Dioxide, Serum28 mmol/L [17 - 32]  Chloride, Owdqk523 mmol/L [98 - 110]  Creatinie, Serum0.7 mg/dL [0.7 - 1.5]  Glucose, Serum79 mg/dL [70 - 99]  Potassium, Serum3.8 mmol/L [3.5 - 5.0]  Sodium, Serum 144 mmol/L [135 - 146]  BMP  01-05-23 @ 07:25  Blood Gas Arterial-Calcium,Ionized--  Blood Urea Nitrogen, Serum 10 mg/dL [10 - 20]  Carbon Dioxide, Serum28 mmol/L [17 - 32]  Chloride, Lzsba976 mmol/L [98 - 110]  Creatinie, Serum0.6 mg/dL<L> [0.7 - 1.5]  Glucose, Serum75 mg/dL [70 - 99]  Potassium, Serum3.7 mmol/L [3.5 - 5.0]  Sodium, Serum 141 mmol/L [135 - 146]              Microbiology:    Culture - Blood (collected 01-04-23 @ 05:26)  Source: .Blood Blood  Preliminary Report (01-05-23 @ 14:02):    No growth to date.    Culture - Blood (collected 01-04-23 @ 05:26)  Source: .Blood Blood  Preliminary Report (01-05-23 @ 14:02):    No growth to date.    Culture - Urine (collected 01-04-23 @ 02:59)  Source: Catheterized Catheterized  Final Report (01-06-23 @ 19:26):    50,000 - 99,000 CFU/mL Enterococcus faecalis    <10,000 CFU/ml Normal Urogenital adam present  Organism: Enterococcus faecalis (01-06-23 @ 19:26)  Organism: Enterococcus faecalis (01-06-23 @ 19:26)      -  Ampicillin: S <=2 Predicts results to ampicillin/sulbactam, amoxacillin-clavulanate and  piperacillin-tazobactam.      -  Ciprofloxacin: S <=1      -  Levofloxacin: S <=1      -  Nitrofurantoin: S <=32 Should not be used to treat pyelonephritis.      -  Tetracycline: R >8      -  Vancomycin: S 2      Method Type: EULALIO        Medications:  acetaminophen     Tablet .. 650 milliGRAM(s) Oral every 6 hours PRN  bacitracin   Ointment 1 Application(s) Topical daily  carbidopa/levodopa  25/100 1 Tablet(s) Oral four times a day  carbidopa/levodopa CR 50/200 1 Tablet(s) Oral <User Schedule>  divalproex  milliGRAM(s) Oral two times a day  enoxaparin Injectable 40 milliGRAM(s) SubCutaneous every 24 hours  escitalopram 20 milliGRAM(s) Oral daily  levothyroxine 88 MICROGram(s) Oral daily  polyethylene glycol 3350 17 Gram(s) Oral at bedtime PRN  QUEtiapine 12.5 milliGRAM(s) Oral at bedtime  simvastatin 20 milliGRAM(s) Oral at bedtime        Assessment and Plan:  Patient is an 83 y/o man with PMH of Parkinson Disease, dementia, mood disorder (unspecified), hypothyroidism and DLD was admitted for altered mental status and fever to 100.5      #  Fever - resolved   UA with small leukocyte esterase and WBC 3, moderate blood, RBC 7 - not very impressive for UTI - urine cxs- enterococcus due to contamination    -blood cultures no growth  f/u RVP-negative , urine cxs- negative, blood cxs negative   CXR without gross infiltrate  WBC WNL  no SIRS or sepsis    # Altered mental status - possibly due to metabolic encephalopathy  vs worsening dementia  Head CT scan- no acute changes , s/p routine EEG- completed, no seizure activity, s/p video EEG- abnormal- rec. by Neuro to start Depakote tx.  -outpatient Neuro f/up   - normal TSH level, reorient as needed, family involved    # Parkinson disease - continue home meds  # Dementia - on seroquel  # Continue current management for hypothyroidism and dyslipidemia  #DVT prophylaxis    DNR/DNI status    #Progress Note Handoff: Patient is pending placement to STR  Family discussion: family is aware and in agreement with pt's d/c planning to STR Disposition: STR once bed is available    Total time spent to complete patient's bedside assessment, review medical chart, discuss medical plan of care with covering medical team/case management was more than 35 minutes with >50% of time spent face to face with patient, discussion with patient/family and/or coordination of care .

## 2023-01-12 ENCOUNTER — RX CHANGE (OUTPATIENT)
Age: 83
End: 2023-01-12

## 2023-01-12 RX ORDER — QUETIAPINE FUMARATE 25 MG/1
25 TABLET ORAL
Qty: 45 | Refills: 1 | Status: ACTIVE | COMMUNITY
Start: 2022-12-20 | End: 1900-01-01

## 2023-01-13 DIAGNOSIS — R41.82 ALTERED MENTAL STATUS, UNSPECIFIED: ICD-10-CM

## 2023-01-13 DIAGNOSIS — E78.5 HYPERLIPIDEMIA, UNSPECIFIED: ICD-10-CM

## 2023-01-13 DIAGNOSIS — R50.9 FEVER, UNSPECIFIED: ICD-10-CM

## 2023-01-13 DIAGNOSIS — F05 DELIRIUM DUE TO KNOWN PHYSIOLOGICAL CONDITION: ICD-10-CM

## 2023-01-13 DIAGNOSIS — G93.41 METABOLIC ENCEPHALOPATHY: ICD-10-CM

## 2023-01-13 DIAGNOSIS — E03.9 HYPOTHYROIDISM, UNSPECIFIED: ICD-10-CM

## 2023-01-13 DIAGNOSIS — Z66 DO NOT RESUSCITATE: ICD-10-CM

## 2023-01-13 DIAGNOSIS — Z78.1 PHYSICAL RESTRAINT STATUS: ICD-10-CM

## 2023-01-13 DIAGNOSIS — F39 UNSPECIFIED MOOD [AFFECTIVE] DISORDER: ICD-10-CM
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
## 2023-01-13 DIAGNOSIS — F02.82 DEMENTIA IN OTHER DISEASES CLASSIFIED ELSEWHERE, UNSPECIFIED SEVERITY, WITH PSYCHOTIC DISTURBANCE: ICD-10-CM

## 2023-01-13 DIAGNOSIS — G20 PARKINSON'S DISEASE: ICD-10-CM

## 2023-01-20 ENCOUNTER — INPATIENT (INPATIENT)
Facility: HOSPITAL | Age: 83
LOS: 5 days | Discharge: HOSPICE | End: 2023-01-26
Attending: HOSPITALIST | Admitting: HOSPITALIST
Payer: MEDICARE

## 2023-01-20 VITALS
HEART RATE: 85 BPM | OXYGEN SATURATION: 95 % | SYSTOLIC BLOOD PRESSURE: 159 MMHG | RESPIRATION RATE: 17 BRPM | WEIGHT: 119.93 LBS | DIASTOLIC BLOOD PRESSURE: 75 MMHG | TEMPERATURE: 100 F

## 2023-01-20 PROBLEM — F03.90 UNSPECIFIED DEMENTIA WITHOUT BEHAVIORAL DISTURBANCE: Chronic | Status: ACTIVE | Noted: 2023-01-04

## 2023-01-20 PROBLEM — E78.5 HYPERLIPIDEMIA, UNSPECIFIED: Chronic | Status: ACTIVE | Noted: 2023-01-04

## 2023-01-20 LAB
ALBUMIN SERPL ELPH-MCNC: 3.9 G/DL — SIGNIFICANT CHANGE UP (ref 3.5–5.2)
ALP SERPL-CCNC: 53 U/L — SIGNIFICANT CHANGE UP (ref 30–115)
ALT FLD-CCNC: 24 U/L — SIGNIFICANT CHANGE UP (ref 0–41)
ANION GAP SERPL CALC-SCNC: 7 MMOL/L — SIGNIFICANT CHANGE UP (ref 7–14)
APPEARANCE UR: ABNORMAL
APTT BLD: 25.6 SEC — LOW (ref 27–39.2)
AST SERPL-CCNC: 21 U/L — SIGNIFICANT CHANGE UP (ref 0–41)
BACTERIA # UR AUTO: NEGATIVE — SIGNIFICANT CHANGE UP
BASE EXCESS BLDV CALC-SCNC: 5.8 MMOL/L — HIGH (ref -2–3)
BASOPHILS # BLD AUTO: 0.11 K/UL — SIGNIFICANT CHANGE UP (ref 0–0.2)
BASOPHILS NFR BLD AUTO: 1 % — SIGNIFICANT CHANGE UP (ref 0–1)
BILIRUB SERPL-MCNC: 0.5 MG/DL — SIGNIFICANT CHANGE UP (ref 0.2–1.2)
BILIRUB UR-MCNC: NEGATIVE — SIGNIFICANT CHANGE UP
BUN SERPL-MCNC: 28 MG/DL — HIGH (ref 10–20)
CA-I SERPL-SCNC: 1.14 MMOL/L — LOW (ref 1.15–1.33)
CALCIUM SERPL-MCNC: 9 MG/DL — SIGNIFICANT CHANGE UP (ref 8.4–10.4)
CHLORIDE SERPL-SCNC: 118 MMOL/L — HIGH (ref 98–110)
CO2 SERPL-SCNC: 31 MMOL/L — SIGNIFICANT CHANGE UP (ref 17–32)
COLOR SPEC: YELLOW — SIGNIFICANT CHANGE UP
CREAT SERPL-MCNC: 0.8 MG/DL — SIGNIFICANT CHANGE UP (ref 0.7–1.5)
DIFF PNL FLD: NEGATIVE — SIGNIFICANT CHANGE UP
EGFR: 73 ML/MIN/1.73M2 — SIGNIFICANT CHANGE UP
EOSINOPHIL # BLD AUTO: 0.06 K/UL — SIGNIFICANT CHANGE UP (ref 0–0.7)
EOSINOPHIL NFR BLD AUTO: 0.5 % — SIGNIFICANT CHANGE UP (ref 0–8)
EPI CELLS # UR: 7 /HPF — HIGH (ref 0–5)
FLUAV AG NPH QL: SIGNIFICANT CHANGE UP
FLUBV AG NPH QL: SIGNIFICANT CHANGE UP
GAS PNL BLDV: 151 MMOL/L — HIGH (ref 136–145)
GAS PNL BLDV: SIGNIFICANT CHANGE UP
GAS PNL BLDV: SIGNIFICANT CHANGE UP
GLUCOSE SERPL-MCNC: 127 MG/DL — HIGH (ref 70–99)
GLUCOSE UR QL: NEGATIVE — SIGNIFICANT CHANGE UP
HCO3 BLDV-SCNC: 30 MMOL/L — HIGH (ref 22–29)
HCT VFR BLD CALC: 46.1 % — SIGNIFICANT CHANGE UP (ref 37–47)
HCT VFR BLDA CALC: 45 % — SIGNIFICANT CHANGE UP (ref 39–51)
HGB BLD CALC-MCNC: 14.9 G/DL — SIGNIFICANT CHANGE UP (ref 12.6–17.4)
HGB BLD-MCNC: 14.3 G/DL — SIGNIFICANT CHANGE UP (ref 12–16)
HYALINE CASTS # UR AUTO: 2 /LPF — SIGNIFICANT CHANGE UP (ref 0–7)
IMM GRANULOCYTES NFR BLD AUTO: 0.4 % — HIGH (ref 0.1–0.3)
INR BLD: 1.14 RATIO — SIGNIFICANT CHANGE UP (ref 0.65–1.3)
KETONES UR-MCNC: NEGATIVE — SIGNIFICANT CHANGE UP
LACTATE BLDV-MCNC: 1.2 MMOL/L — SIGNIFICANT CHANGE UP (ref 0.5–2)
LEUKOCYTE ESTERASE UR-ACNC: NEGATIVE — SIGNIFICANT CHANGE UP
LYMPHOCYTES # BLD AUTO: 1.62 K/UL — SIGNIFICANT CHANGE UP (ref 1.2–3.4)
LYMPHOCYTES # BLD AUTO: 14.2 % — LOW (ref 20.5–51.1)
MCHC RBC-ENTMCNC: 31 G/DL — LOW (ref 32–37)
MCHC RBC-ENTMCNC: 31.2 PG — HIGH (ref 27–31)
MCV RBC AUTO: 100.4 FL — HIGH (ref 81–99)
MONOCYTES # BLD AUTO: 1.51 K/UL — HIGH (ref 0.1–0.6)
MONOCYTES NFR BLD AUTO: 13.2 % — HIGH (ref 1.7–9.3)
NEUTROPHILS # BLD AUTO: 8.05 K/UL — HIGH (ref 1.4–6.5)
NEUTROPHILS NFR BLD AUTO: 70.7 % — SIGNIFICANT CHANGE UP (ref 42.2–75.2)
NITRITE UR-MCNC: NEGATIVE — SIGNIFICANT CHANGE UP
NRBC # BLD: 0 /100 WBCS — SIGNIFICANT CHANGE UP (ref 0–0)
PCO2 BLDV: 40 MMHG — SIGNIFICANT CHANGE UP (ref 39–42)
PH BLDV: 7.48 — HIGH (ref 7.32–7.43)
PH UR: 8.5 — HIGH (ref 5–8)
PLATELET # BLD AUTO: 174 K/UL — SIGNIFICANT CHANGE UP (ref 130–400)
PO2 BLDV: 52 MMHG — SIGNIFICANT CHANGE UP
POTASSIUM BLDV-SCNC: 3.7 MMOL/L — SIGNIFICANT CHANGE UP (ref 3.5–5.1)
POTASSIUM SERPL-MCNC: 3.9 MMOL/L — SIGNIFICANT CHANGE UP (ref 3.5–5)
POTASSIUM SERPL-SCNC: 3.9 MMOL/L — SIGNIFICANT CHANGE UP (ref 3.5–5)
PROT SERPL-MCNC: 6.4 G/DL — SIGNIFICANT CHANGE UP (ref 6–8)
PROT UR-MCNC: ABNORMAL
PROTHROM AB SERPL-ACNC: 13 SEC — HIGH (ref 9.95–12.87)
RBC # BLD: 4.59 M/UL — SIGNIFICANT CHANGE UP (ref 4.2–5.4)
RBC # FLD: 13.1 % — SIGNIFICANT CHANGE UP (ref 11.5–14.5)
RBC CASTS # UR COMP ASSIST: 8 /HPF — HIGH (ref 0–4)
RSV RNA NPH QL NAA+NON-PROBE: SIGNIFICANT CHANGE UP
SAO2 % BLDV: 87.1 % — SIGNIFICANT CHANGE UP
SARS-COV-2 RNA SPEC QL NAA+PROBE: SIGNIFICANT CHANGE UP
SODIUM SERPL-SCNC: 156 MMOL/L — HIGH (ref 135–146)
SP GR SPEC: 1.02 — SIGNIFICANT CHANGE UP (ref 1.01–1.03)
UROBILINOGEN FLD QL: SIGNIFICANT CHANGE UP
VALPROATE SERPL-MCNC: 6 UG/ML — LOW (ref 50–100)
WBC # BLD: 11.4 K/UL — HIGH (ref 4.8–10.8)
WBC # FLD AUTO: 11.4 K/UL — HIGH (ref 4.8–10.8)
WBC UR QL: 1 /HPF — SIGNIFICANT CHANGE UP (ref 0–5)

## 2023-01-20 PROCEDURE — 71045 X-RAY EXAM CHEST 1 VIEW: CPT | Mod: 26

## 2023-01-20 PROCEDURE — 99285 EMERGENCY DEPT VISIT HI MDM: CPT

## 2023-01-20 PROCEDURE — 93010 ELECTROCARDIOGRAM REPORT: CPT

## 2023-01-20 RX ORDER — SODIUM CHLORIDE 9 MG/ML
1500 INJECTION, SOLUTION INTRAVENOUS ONCE
Refills: 0 | Status: COMPLETED | OUTPATIENT
Start: 2023-01-20 | End: 2023-01-20

## 2023-01-20 RX ORDER — CEFTRIAXONE 500 MG/1
2000 INJECTION, POWDER, FOR SOLUTION INTRAMUSCULAR; INTRAVENOUS ONCE
Refills: 0 | Status: COMPLETED | OUTPATIENT
Start: 2023-01-20 | End: 2023-01-20

## 2023-01-20 RX ORDER — ACETAMINOPHEN 500 MG
650 TABLET ORAL ONCE
Refills: 0 | Status: COMPLETED | OUTPATIENT
Start: 2023-01-20 | End: 2023-01-21

## 2023-01-20 RX ORDER — ACYCLOVIR SODIUM 500 MG
720 VIAL (EA) INTRAVENOUS ONCE
Refills: 0 | Status: DISCONTINUED | OUTPATIENT
Start: 2023-01-20 | End: 2023-01-21

## 2023-01-20 RX ADMIN — SODIUM CHLORIDE 1500 MILLILITER(S): 9 INJECTION, SOLUTION INTRAVENOUS at 16:18

## 2023-01-20 RX ADMIN — CEFTRIAXONE 100 MILLIGRAM(S): 500 INJECTION, POWDER, FOR SOLUTION INTRAMUSCULAR; INTRAVENOUS at 21:04

## 2023-01-20 NOTE — ED PROVIDER NOTE - PROGRESS NOTE DETAILS
FF: I discussed with pt daughter and son doing an LP to r/o meningitis due to fever and AMS with no known source. pt family does not want pt to have an LP done at this time, but agree to ppx tx pt for meningitis.

## 2023-01-20 NOTE — ED PROVIDER NOTE - PHYSICAL EXAMINATION
Physical Exam    Vital Signs: I have reviewed the initial vital signs.  Constitutional: well-nourished, appears stated age, no acute distress  Eyes: PERRLA, pt eyelashes are matted and there is crusting of the eyes b/l.   Cardiovascular: S1 and S2, regular rate, regular rhythm, well-perfused extremities, radial pulses equal and 2+ b/l.   Respiratory: unlabored respiratory effort, clear to auscultation bilaterally no wheezing, rales and rhonchi. pt is speaking full sentences. no accessory muscle use.   Gastrointestinal: soft, non-tender, nondistended abdomen, no pulsatile mass, normal bowl sounds, no rebound, no guarding  Musculoskeletal: supple neck, no lower extremity edema, no calf tenderness  Integumentary: warm, dry, no rash  Neurologic: pt is sleeping but responds to painful stimuli   Psychiatric: appropriate mood, appropriate affect

## 2023-01-20 NOTE — ED PROVIDER NOTE - DIFFERENTIAL DIAGNOSIS
Differential Diagnosis c/f metabolic encephalopathy 2/2 infection, r/o lyte abn  given fever, less likely primary intracranial process, CT from last admission reviewed and w/o mass or acute findings

## 2023-01-20 NOTE — ED PROVIDER NOTE - CARE PLAN
1 Principal Discharge DX:	Fever  Secondary Diagnosis:	AMS (altered mental status)  Secondary Diagnosis:	Hypernatremia

## 2023-01-20 NOTE — ED PROVIDER NOTE - ATTENDING APP SHARED VISIT CONTRIBUTION OF CARE
84 y/o female with a PMH of Parkinson Disease, dementia, mood disorder (unspecified), hypothyroidism and DLD  pt presents for eval of AMS for 2 days and fever. pt was recently admitted for similar ams/low grade fever and was dx w/ uti. pt had abn eeg and was started on depakote and seroquel. at nh, pt appeared sedated so depakote and seroquel were held w/o improvement of MS. today, pt had fever prompting daughter to bring pt to ED. pt limited historian 2/2 AMS. migueler denies aspiration event    vss  gen- NAD, aaox3  card-rrr  lungs-ctab, no wheezing or rhonchi  abd-sntnd, no guarding or rebound  neuro- limited given hx parkinsons and AMS, pt responding to painful stimulus

## 2023-01-20 NOTE — ED PROVIDER NOTE - OBJECTIVE STATEMENT
84 y/o female with a PMH of Parkinson Disease, dementia, mood disorder (unspecified), hypothyroidism and DLD presents to the ED from [facility], [place], operated by [facility] for evaluation of fever with tmax of 102F today and lethargy. pt was recently admitted 01/04-01/07 for fever and AMS. pt had an EEG that was abnormal during admission and was started on 250mg of depakote twice a day. while pt was at [place] she became more lethargic so they decreased the dose od depakote and stopped giving her seroquel at night. 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 Bettencourt, pt is on estrogen cream 3x a week for it. pt daughter denies pt having cough, sore throat, sob, chest pain, abdominal pain, n/v/d/c, or recent sick contacts.

## 2023-01-20 NOTE — ED PROVIDER NOTE - NS ED ROS FT
CONST: No fever, chills or bodyaches. (+) lethargy  EYES: No pain, redness, drainage or visual changes.  ENT: No ear pain or discharge, nasal discharge or congestion. No sore throat  CARD: No chest pain, palpitations  RESP: No SOB, cough, hemoptysis. No hx of asthma or COPD  GI: No abdominal pain, N/V/D  : No urinary symptoms  MS: No joint pain, back pain or extremity pain/injury  SKIN: No rashes  NEURO: No headache, dizziness, paresthesias or LOC

## 2023-01-21 LAB
ALBUMIN SERPL ELPH-MCNC: 3.7 G/DL — SIGNIFICANT CHANGE UP (ref 3.5–5.2)
ALP SERPL-CCNC: 51 U/L — SIGNIFICANT CHANGE UP (ref 30–115)
ALT FLD-CCNC: 24 U/L — SIGNIFICANT CHANGE UP (ref 0–41)
ANION GAP SERPL CALC-SCNC: 11 MMOL/L — SIGNIFICANT CHANGE UP (ref 7–14)
AST SERPL-CCNC: 24 U/L — SIGNIFICANT CHANGE UP (ref 0–41)
BASOPHILS # BLD AUTO: 0.12 K/UL — SIGNIFICANT CHANGE UP (ref 0–0.2)
BASOPHILS NFR BLD AUTO: 1.2 % — HIGH (ref 0–1)
BILIRUB SERPL-MCNC: 0.6 MG/DL — SIGNIFICANT CHANGE UP (ref 0.2–1.2)
BUN SERPL-MCNC: 23 MG/DL — HIGH (ref 10–20)
CALCIUM SERPL-MCNC: 8.7 MG/DL — SIGNIFICANT CHANGE UP (ref 8.4–10.5)
CHLORIDE SERPL-SCNC: 117 MMOL/L — HIGH (ref 98–110)
CK SERPL-CCNC: 140 U/L — SIGNIFICANT CHANGE UP (ref 0–225)
CO2 SERPL-SCNC: 26 MMOL/L — SIGNIFICANT CHANGE UP (ref 17–32)
CREAT SERPL-MCNC: 0.8 MG/DL — SIGNIFICANT CHANGE UP (ref 0.7–1.5)
EGFR: 73 ML/MIN/1.73M2 — SIGNIFICANT CHANGE UP
EOSINOPHIL # BLD AUTO: 0.12 K/UL — SIGNIFICANT CHANGE UP (ref 0–0.7)
EOSINOPHIL NFR BLD AUTO: 1.2 % — SIGNIFICANT CHANGE UP (ref 0–8)
GLUCOSE SERPL-MCNC: 114 MG/DL — HIGH (ref 70–99)
HCT VFR BLD CALC: 44.8 % — SIGNIFICANT CHANGE UP (ref 37–47)
HGB BLD-MCNC: 14.1 G/DL — SIGNIFICANT CHANGE UP (ref 12–16)
IMM GRANULOCYTES NFR BLD AUTO: 0.4 % — HIGH (ref 0.1–0.3)
LYMPHOCYTES # BLD AUTO: 1.41 K/UL — SIGNIFICANT CHANGE UP (ref 1.2–3.4)
LYMPHOCYTES # BLD AUTO: 14.3 % — LOW (ref 20.5–51.1)
MAGNESIUM SERPL-MCNC: 2.4 MG/DL — SIGNIFICANT CHANGE UP (ref 1.8–2.4)
MCHC RBC-ENTMCNC: 31.4 PG — HIGH (ref 27–31)
MCHC RBC-ENTMCNC: 31.5 G/DL — LOW (ref 32–37)
MCV RBC AUTO: 99.8 FL — HIGH (ref 81–99)
MONOCYTES # BLD AUTO: 1.18 K/UL — HIGH (ref 0.1–0.6)
MONOCYTES NFR BLD AUTO: 12 % — HIGH (ref 1.7–9.3)
NEUTROPHILS # BLD AUTO: 6.97 K/UL — HIGH (ref 1.4–6.5)
NEUTROPHILS NFR BLD AUTO: 70.9 % — SIGNIFICANT CHANGE UP (ref 42.2–75.2)
NRBC # BLD: 0 /100 WBCS — SIGNIFICANT CHANGE UP (ref 0–0)
PLATELET # BLD AUTO: 157 K/UL — SIGNIFICANT CHANGE UP (ref 130–400)
POTASSIUM SERPL-MCNC: 3.9 MMOL/L — SIGNIFICANT CHANGE UP (ref 3.5–5)
POTASSIUM SERPL-SCNC: 3.9 MMOL/L — SIGNIFICANT CHANGE UP (ref 3.5–5)
PROT SERPL-MCNC: 5.9 G/DL — LOW (ref 6–8)
RBC # BLD: 4.49 M/UL — SIGNIFICANT CHANGE UP (ref 4.2–5.4)
RBC # FLD: 12.8 % — SIGNIFICANT CHANGE UP (ref 11.5–14.5)
SODIUM SERPL-SCNC: 154 MMOL/L — HIGH (ref 135–146)
WBC # BLD: 9.84 K/UL — SIGNIFICANT CHANGE UP (ref 4.8–10.8)
WBC # FLD AUTO: 9.84 K/UL — SIGNIFICANT CHANGE UP (ref 4.8–10.8)

## 2023-01-21 PROCEDURE — 99222 1ST HOSP IP/OBS MODERATE 55: CPT

## 2023-01-21 PROCEDURE — 99223 1ST HOSP IP/OBS HIGH 75: CPT

## 2023-01-21 PROCEDURE — 70450 CT HEAD/BRAIN W/O DYE: CPT | Mod: 26

## 2023-01-21 RX ORDER — SIMVASTATIN 20 MG/1
20 TABLET, FILM COATED ORAL AT BEDTIME
Refills: 0 | Status: DISCONTINUED | OUTPATIENT
Start: 2023-01-21 | End: 2023-01-24

## 2023-01-21 RX ORDER — SIMVASTATIN 20 MG/1
1 TABLET, FILM COATED ORAL
Qty: 0 | Refills: 0 | DISCHARGE

## 2023-01-21 RX ORDER — CHLORHEXIDINE GLUCONATE 213 G/1000ML
1 SOLUTION TOPICAL DAILY
Refills: 0 | Status: DISCONTINUED | OUTPATIENT
Start: 2023-01-21 | End: 2023-01-24

## 2023-01-21 RX ORDER — ESCITALOPRAM OXALATE 10 MG/1
1 TABLET, FILM COATED ORAL
Qty: 0 | Refills: 0 | DISCHARGE

## 2023-01-21 RX ORDER — CARBIDOPA AND LEVODOPA 25; 100 MG/1; MG/1
1 TABLET ORAL
Qty: 0 | Refills: 0 | DISCHARGE

## 2023-01-21 RX ORDER — ACYCLOVIR SODIUM 500 MG
520 VIAL (EA) INTRAVENOUS ONCE
Refills: 0 | Status: COMPLETED | OUTPATIENT
Start: 2023-01-21 | End: 2023-01-21

## 2023-01-21 RX ORDER — DIVALPROEX SODIUM 500 MG/1
250 TABLET, DELAYED RELEASE ORAL
Refills: 0 | Status: DISCONTINUED | OUTPATIENT
Start: 2023-01-21 | End: 2023-01-21

## 2023-01-21 RX ORDER — QUETIAPINE FUMARATE 200 MG/1
25 TABLET, FILM COATED ORAL AT BEDTIME
Refills: 0 | Status: DISCONTINUED | OUTPATIENT
Start: 2023-01-21 | End: 2023-01-23

## 2023-01-21 RX ORDER — ACYCLOVIR SODIUM 500 MG
520 VIAL (EA) INTRAVENOUS EVERY 8 HOURS
Refills: 0 | Status: DISCONTINUED | OUTPATIENT
Start: 2023-01-21 | End: 2023-01-24

## 2023-01-21 RX ORDER — CARBIDOPA AND LEVODOPA 25; 100 MG/1; MG/1
1 TABLET ORAL
Refills: 0 | Status: DISCONTINUED | OUTPATIENT
Start: 2023-01-21 | End: 2023-01-24

## 2023-01-21 RX ORDER — VALPROIC ACID (AS SODIUM SALT) 250 MG/5ML
250 SOLUTION, ORAL ORAL EVERY 12 HOURS
Refills: 0 | Status: DISCONTINUED | OUTPATIENT
Start: 2023-01-21 | End: 2023-01-26

## 2023-01-21 RX ORDER — SODIUM CHLORIDE 9 MG/ML
1000 INJECTION, SOLUTION INTRAVENOUS
Refills: 0 | Status: DISCONTINUED | OUTPATIENT
Start: 2023-01-21 | End: 2023-01-25

## 2023-01-21 RX ORDER — LEVOTHYROXINE SODIUM 125 MCG
1 TABLET ORAL
Qty: 0 | Refills: 0 | DISCHARGE

## 2023-01-21 RX ORDER — HEPARIN SODIUM 5000 [USP'U]/ML
5000 INJECTION INTRAVENOUS; SUBCUTANEOUS EVERY 8 HOURS
Refills: 0 | Status: DISCONTINUED | OUTPATIENT
Start: 2023-01-21 | End: 2023-01-24

## 2023-01-21 RX ORDER — AMPICILLIN TRIHYDRATE 250 MG
CAPSULE ORAL
Refills: 0 | Status: DISCONTINUED | OUTPATIENT
Start: 2023-01-21 | End: 2023-01-24

## 2023-01-21 RX ORDER — LEVOTHYROXINE SODIUM 125 MCG
88 TABLET ORAL DAILY
Refills: 0 | Status: DISCONTINUED | OUTPATIENT
Start: 2023-01-21 | End: 2023-01-21

## 2023-01-21 RX ORDER — ESCITALOPRAM OXALATE 10 MG/1
20 TABLET, FILM COATED ORAL DAILY
Refills: 0 | Status: DISCONTINUED | OUTPATIENT
Start: 2023-01-21 | End: 2023-01-24

## 2023-01-21 RX ORDER — ACETAMINOPHEN 500 MG
650 TABLET ORAL EVERY 8 HOURS
Refills: 0 | Status: DISCONTINUED | OUTPATIENT
Start: 2023-01-21 | End: 2023-01-24

## 2023-01-21 RX ORDER — AMPICILLIN TRIHYDRATE 250 MG
2 CAPSULE ORAL EVERY 6 HOURS
Refills: 0 | Status: DISCONTINUED | OUTPATIENT
Start: 2023-01-22 | End: 2023-01-24

## 2023-01-21 RX ORDER — AMPICILLIN TRIHYDRATE 250 MG
2 CAPSULE ORAL ONCE
Refills: 0 | Status: COMPLETED | OUTPATIENT
Start: 2023-01-21 | End: 2023-01-21

## 2023-01-21 RX ORDER — CEFTRIAXONE 500 MG/1
2000 INJECTION, POWDER, FOR SOLUTION INTRAMUSCULAR; INTRAVENOUS EVERY 12 HOURS
Refills: 0 | Status: DISCONTINUED | OUTPATIENT
Start: 2023-01-21 | End: 2023-01-24

## 2023-01-21 RX ORDER — VANCOMYCIN HCL 1 G
1000 VIAL (EA) INTRAVENOUS EVERY 12 HOURS
Refills: 0 | Status: DISCONTINUED | OUTPATIENT
Start: 2023-01-21 | End: 2023-01-24

## 2023-01-21 RX ORDER — LEVOTHYROXINE SODIUM 125 MCG
75 TABLET ORAL AT BEDTIME
Refills: 0 | Status: DISCONTINUED | OUTPATIENT
Start: 2023-01-21 | End: 2023-01-26

## 2023-01-21 RX ADMIN — Medication 200 GRAM(S): at 16:40

## 2023-01-21 RX ADMIN — Medication 75 MICROGRAM(S): at 23:49

## 2023-01-21 RX ADMIN — Medication 110.4 MILLIGRAM(S): at 23:49

## 2023-01-21 RX ADMIN — HEPARIN SODIUM 5000 UNIT(S): 5000 INJECTION INTRAVENOUS; SUBCUTANEOUS at 23:51

## 2023-01-21 RX ADMIN — Medication 110.4 MILLIGRAM(S): at 11:54

## 2023-01-21 RX ADMIN — Medication 650 MILLIGRAM(S): at 08:17

## 2023-01-21 RX ADMIN — Medication 250 MILLIGRAM(S): at 17:48

## 2023-01-21 RX ADMIN — SODIUM CHLORIDE 75 MILLILITER(S): 9 INJECTION, SOLUTION INTRAVENOUS at 16:40

## 2023-01-21 RX ADMIN — Medication 650 MILLIGRAM(S): at 10:22

## 2023-01-21 RX ADMIN — CEFTRIAXONE 100 MILLIGRAM(S): 500 INJECTION, POWDER, FOR SOLUTION INTRAMUSCULAR; INTRAVENOUS at 19:14

## 2023-01-21 NOTE — CONSULT NOTE ADULT - SUBJECTIVE AND OBJECTIVE BOX
Neurology Consult    Patient is a 83y old  Female who presents with a chief complaint of Fever (2023 15:30)      HPI:  Patient is an 83 y/o man with PMH of advanced Parkinson Disease, dementia, hypothyroidism and dyslipidemia, presents to the ED for altered mental status.    Patient is a poor historian, and source of information is the patient's daughter and charting per nursing home.    Patient was recently admitted to the hospital for similar complaints, for which she was given empiric antibiotics and had a vEEG done showing intermittent sharp waves, and for which the patient was placed on valproic acid. Back then, there was a possibility of meningitis, but the daughter who is the health care proxy, did not wish any procedures to be done or any LP.    She presents to the hospital due to persistent altered mental status, lack of cooperation with the staff, and spiking fever to th 38.  Per daughter and chart, there is are no focal neurologic symptoms, chest pain, shortness of breath, cough, changes in bowel/ urinary habits.  To be noted that the dose of valproic acid was decreased in the NH, as they were concerned that it might be contributing to the altered mental status.    In the ED, the patient was HD stable, febrile with a temperature of 38.1C.  Labs were significant for leukocytosis of 11.4, Na= 156.  Valproic acid levels were of 6, UA was not significant, RVP is negative.  CXR did not show any acute cardiopulmonary pathology.    Patient received in the ED ceftriaxone and acyclovir and will be admitted for management. (2023 15:30)      PAST MEDICAL & SURGICAL HISTORY:  Hypothyroid      Parkinson disease      Dyslipidemia      Dementia      No significant past surgical history          FAMILY HISTORY:      Social History: (-) x 3    Allergies    No Known Allergies    Intolerances        MEDICATIONS  (STANDING):  acyclovir IVPB 520 milliGRAM(s) IV Intermittent every 8 hours  ampicillin  IVPB      carbidopa/levodopa  25/100 1 Tablet(s) Oral four times a day  carbidopa/levodopa CR 50/200 1 Tablet(s) Oral <User Schedule>  cefTRIAXone   IVPB 2000 milliGRAM(s) IV Intermittent every 12 hours  chlorhexidine 2% Cloths 1 Application(s) Topical daily  dextrose 5% + sodium chloride 0.45%. 1000 milliLiter(s) (75 mL/Hr) IV Continuous <Continuous>  escitalopram 20 milliGRAM(s) Oral daily  heparin   Injectable 5000 Unit(s) SubCutaneous every 8 hours  levothyroxine Injectable 75 MICROGram(s) IV Push at bedtime  QUEtiapine 25 milliGRAM(s) Oral at bedtime  simvastatin 20 milliGRAM(s) Oral at bedtime  valproate sodium  IVPB 250 milliGRAM(s) IV Intermittent every 12 hours  vancomycin  IVPB 1000 milliGRAM(s) IV Intermittent every 12 hours    MEDICATIONS  (PRN):      Vital Signs Last 24 Hrs  T(C): 38.2 (2023 16:03), Max: 38.2 (2023 16:03)  T(F): 100.8 (2023 16:03), Max: 100.8 (2023 16:03)  HR: 86 (2023 16:03) (73 - 105)  BP: 148/76 (2023 16:03) (131/65 - 174/70)  BP(mean): --  RR: 22 (2023 16:03) (16 - 22)  SpO2: 99% (2023 16:03) (94% - 99%)    Parameters below as of 2023 16:03  Patient On (Oxygen Delivery Method): room air        Examination:  Patient obtunded   Opens eyes to noxious stimuli peripherally  No verbal output, not following commands  No facial asymmetry, no gaze  Patient with Flexion and increased tone of bilateral UE for 1-2 weeks as per daughter  Bilateral LE with bilateral foot drop and significant increased tone.   Patient with minimal movement to bilateral UE LE to peripheral noxious stimuli  3+ reflexes throughout  Exam limited by mental status.       Labs:   CBC Full  -  ( 2023 15:53 )  WBC Count : 9.84 K/uL  RBC Count : 4.49 M/uL  Hemoglobin : 14.1 g/dL  Hematocrit : 44.8 %  Platelet Count - Automated : 157 K/uL  Mean Cell Volume : 99.8 fL  Mean Cell Hemoglobin : 31.4 pg  Mean Cell Hemoglobin Concentration : 31.5 g/dL  Auto Neutrophil # : 6.97 K/uL  Auto Lymphocyte # : 1.41 K/uL  Auto Monocyte # : 1.18 K/uL  Auto Eosinophil # : 0.12 K/uL  Auto Basophil # : 0.12 K/uL  Auto Neutrophil % : 70.9 %  Auto Lymphocyte % : 14.3 %  Auto Monocyte % : 12.0 %  Auto Eosinophil % : 1.2 %  Auto Basophil % : 1.2 %        154<H>  |  117<H>  |  23<H>  ----------------------------<  114<H>  3.9   |  26  |  0.8    Ca    8.7      2023 15:53  Mg     2.4         TPro  5.9<L>  /  Alb  3.7  /  TBili  0.6  /  DBili  x   /  AST  24  /  ALT  24  /  AlkPhos  51      LIVER FUNCTIONS - ( 2023 15:53 )  Alb: 3.7 g/dL / Pro: 5.9 g/dL / ALK PHOS: 51 U/L / ALT: 24 U/L / AST: 24 U/L / GGT: x           PT/INR - ( 2023 16:12 )   PT: 13.00 sec;   INR: 1.14 ratio         PTT - ( 2023 16:12 )  PTT:25.6 sec  Urinalysis Basic - ( 2023 17:56 )    Color: Yellow / Appearance: Slightly Turbid / S.021 / pH: x  Gluc: x / Ketone: Negative  / Bili: Negative / Urobili: <2 mg/dL   Blood: x / Protein: 30 mg/dL / Nitrite: Negative   Leuk Esterase: Negative / RBC: 8 /HPF / WBC 1 /HPF   Sq Epi: x / Non Sq Epi: 7 /HPF / Bacteria: Negative          Neuroimaging:  NCHCT:   pending  23 @ 17:18

## 2023-01-21 NOTE — CONSULT NOTE ADULT - ASSESSMENT
Impression:  Patient is an 81 y/o man with PMH of advanced Parkinson Disease, dementia, hypothyroidism and dyslipidemia, presents to the ED for altered mental status.  Patient was recently admitted to the hospital for similar complaints, for which she was given empiric antibiotics and had a vEEG done showing intermittent sharp waves, and for which the patient was placed on valproic acid. Back then, there was a possibility of meningitis, but the daughter who is the health care proxy refused LP. She presents to the hospital due to persistent altered mental status, worsening increased tone to bialteral UE LE and fever. Depakote decreased in the NH for drowsiness. In the ED, the patient was HD stable, febrile with a temperature of 100.5. Labs were significant for leukocytosis of 11.4, Na= 156. Patient with parkinsons disease, significant increased tone throughout as well as fever in presence of taking seroquel and lexapro  symptoms more likely due to infectious etiology however NMS should be ruled out.     Suggestion:  Discussed with Dr. Hilario  [facility]  Routine EEG(done)  Neuro critical care evaluation for NMS  ID consult for management of infectious process.   Medical management as per primary team discussed with [facility].

## 2023-01-21 NOTE — CONSULT NOTE ADULT - ASSESSMENT
Patient is an 81 y/o man with PMH of advanced Parkinson Disease, dementia, hypothyroidism and dyslipidemia, presents to the ED for altered mental status and spiking fever to th 38. Treated empirically for meningitis.      Plan.  NCC is not indicated for patient at this time  Restart parkinson's medication  Rehydrate with IV fluid  Correct all metabolic derangement and replete electrolytes as needed  NCC will sign off the case at this time.    Case discussed with Dr. Syed         Patient is an 81 y/o man with PMH of advanced Parkinson Disease, dementia, hypothyroidism and dyslipidemia, presents to the ED for altered mental status and spiking fever to th 38. Treated empirically for meningitis.      Plan.  NCC is not indicated for patient at this time  Restart home parkinson's medication  Rehydrate with IV fluid  Correct all metabolic derangement and replete electrolytes as needed  NCC will sign off the case at this time.    Case discussed with Dr. Syed         Patient is an 81 y/o man with PMH of advanced Parkinson Disease, dementia, hypothyroidism and dyslipidemia, presents to the ED for altered mental status and rigid extremities. Currently treated with abx empirically for meningitis.      Plan  NCC is not indicated for patient at this time  Restart home parkinson's medication  Rehydrate with IV fluid  Correct all metabolic derangement and replete electrolytes as needed  NCC will sign off the case at this time.    Case discussed with Dr. Syed

## 2023-01-21 NOTE — CONSULT NOTE ADULT - NSCONSULTADDITIONALINFOA_GEN_ALL_CORE
head ct unremarkable  follow EEG  MRI brain  ID consult  Depakote levels  will follow head ct unremarkable  follow EEG  MRI brain  ID consult  Depakote levels, 6   it seems parkinson's meds not given?  will follow

## 2023-01-21 NOTE — H&P ADULT - NSHPPHYSICALEXAM_GEN_ALL_CORE
General: Awake, not alert  CV: RRR  Chest: CTAB  Abdomen: Soft, non- distended  Extremities: no edema, cyanosis

## 2023-01-21 NOTE — H&P ADULT - ATTENDING COMMENTS
83 y/o man with PMH of advanced Parkinson Disease, dementia, hypothyroidism and dyslipidemia, presents to the ED for altered mental status.    #Metabolic Encephalopathy  #Fevers  LP was deferred in ED by family  - Cont empiric tx for meningitis (acyclovir, ceftriaxone, vancomycin, ampicillin) for now  - ID eval requested  - f/u BCx, procalcitonin  - Pt has living will that she does not want artificial feeding. Family does not want NGT placement for patient  - Pt recently had MRI with her neurologist, Dr. Gannon. Will consult neuro for f/u    #Seizure hx  Was on depakote 250 BID for seizure and agitation/behavioral issues from her worsening dementia. Daughter believes her mental status declines since depakote was started. Dosage was lowered at the NH, depakote levels are subtherapeutic here  - Will   - Neuro eval requested, family believes depakote may be causing worsening mental status  - EEG    # Hypothyroidism    C/w  levothyroxine  Recent TSH of 1.7, no need for repeat  On synthroid 88mcg qD, will give IV synthroid 75mcg qD for now    # Parkinson disease    C/w Sinemet 4 times daily, and Sinemet ER at bedtime

## 2023-01-21 NOTE — CONSULT NOTE ADULT - SUBJECTIVE AND OBJECTIVE BOX
Patient is an 81 y/o man with PMH of advanced Parkinson Disease, dementia, hypothyroidism and dyslipidemia, presents to the ED for altered mental status. Patient is a poor historian, and source of information is the patient's daughter and charting per nursing home. Patient was recently admitted to the hospital for similar complaints, for which she was given empiric antibiotics and had a vEEG done showing intermittent sharp waves, and for which the patient was placed on valproic acid. Back then, there was a possibility of meningitis, but the daughter who is the health care proxy, did not wish any procedures to be done or any LP. She presents to the hospital due to persistent altered mental status, lack of cooperation with the staff, and spiking fever to th 38. Per daughter and chart, there is are no focal neurologic symptoms, chest pain, shortness of breath, cough, changes in bowel/ urinary habits. To be noted that the dose of valproic acid was decreased in the NH, as they were concerned that it might be contributing to the altered mental status.    In the ED, the patient was HD stable, febrile with a temperature of 38.1C. Labs were significant for leukocytosis of 11.4, Na= 156. Valproic acid levels were of 6, UA was not significant, RVP is negative. CXR did not show any acute cardiopulmonary pathology. Patient received in the ED ceftriaxone and acyclovir and will be admitted for management. (21 Jan 2023 15:30)      Physical Exam:  General: No acute distress  HEENT: Anicteric sclerae  Cardiac: [id number]  Lungs: Clear  Abdomen: Soft, non-tender, +BS  Extremities: No c/c/e  Skin/Incision Site: Clean, dry and intact  Neurologic: Awake, alert, No EO, responded to question with one word, no follows commands, PERRL, no dysarthria, no gaze preference or dysconjugate, no facial droop, rigid in all four extremities U > L Patient is an 81 y/o man with PMH of advanced Parkinson Disease, dementia, hypothyroidism and dyslipidemia, presents to the ED for altered mental status. Patient is a poor historian, and source of information is the patient's daughter and charting per nursing home. Patient was recently admitted to the hospital for similar complaints, for which she was given empiric antibiotics and had a vEEG done showing intermittent sharp waves, and for which the patient was placed on valproic acid. Back then, there was a possibility of meningitis, but the daughter who is the health care proxy, did not wish any procedures to be done or any LP. She presents to the hospital due to persistent altered mental status, lack of cooperation with the staff, and spiking fever to th 38. Per daughter and chart, there is are no focal neurologic symptoms, chest pain, shortness of breath, cough, changes in bowel/ urinary habits. To be noted that the dose of valproic acid was decreased in the NH, as they were concerned that it might be contributing to the altered mental status.    In the ED, the patient was HD stable, febrile with a temperature of 38.1C. Labs were significant for leukocytosis of 11.4, Na= 156. Valproic acid levels were of 6, UA was not significant, RVP is negative. CXR did not show any acute cardiopulmonary pathology. Patient received in the ED ceftriaxone and acyclovir and will be admitted for management. (21 Jan 2023 15:30)      Physical Exam:  General: No acute distress  HEENT: Anicteric sclerae  Cardiac: [id number]  Lungs: Clear  Abdomen: Soft, non-tender, +BS  Extremities: No c/c/e  Skin/Incision Site: Clean, diaphoretic and intact  Neurologic: Awake, alert, No EO, responded to simple question with one word (Yes and No), no commands following, PERRL, no dysarthria, no gaze preference or dysconjugate, no facial droop, rigid in all four extremities U>L      ICU Vital Signs Last 24 Hrs  T(C): 38.2 (21 Jan 2023 16:03), Max: 38.2 (21 Jan 2023 16:03)  T(F): 100.8 (21 Jan 2023 16:03), Max: 100.8 (21 Jan 2023 16:03)  HR: 86 (21 Jan 2023 16:03) (74 - 105)  BP: 148/76 (21 Jan 2023 16:03) (131/65 - 174/70)  BP(mean): --  ABP: --  ABP(mean): --  RR: 22 (21 Jan 2023 16:03) (16 - 22)  SpO2: 99% (21 Jan 2023 16:03) (94% - 99%)          LABS:  Na: 154 (01-21 @ 15:53), 156 (01-20 @ 16:12)  K: 3.9 (01-21 @ 15:53), 3.9 (01-20 @ 16:12)  Cl: 117 (01-21 @ 15:53), 118 (01-20 @ 16:12)  CO2: 26 (01-21 @ 15:53), 31 (01-20 @ 16:12)  BUN: 23 (01-21 @ 15:53), 28 (01-20 @ 16:12)  Cr: 0.8 (01-21 @ 15:53), 0.8 (01-20 @ 16:12)  Glu: 114(01-21 @ 15:53), 127(01-20 @ 16:12)    Hgb: 14.1 (01-21 @ 15:53), 14.3 (01-20 @ 16:12)  Hct: 44.8 (01-21 @ 15:53), 46.1 (01-20 @ 16:12)  WBC: 9.84 (01-21 @ 15:53), 11.40 (01-20 @ 16:12)  Plt: 157 (01-21 @ 15:53), 174 (01-20 @ 16:12)    INR: 1.14 01-20-23 @ 16:12  PTT: 25.6 01-20-23 @ 16:12          LIVER FUNCTIONS - ( 21 Jan 2023 15:53 )  Alb: 3.7 g/dL / Pro: 5.9 g/dL / ALK PHOS: 51 U/L / ALT: 24 U/L / AST: 24 U/L / GGT: x                 MEDICATIONS  (STANDING):  acyclovir IVPB 520 milliGRAM(s) IV Intermittent every 8 hours  ampicillin  IVPB      carbidopa/levodopa  25/100 1 Tablet(s) Oral four times a day  carbidopa/levodopa CR 50/200 1 Tablet(s) Oral <User Schedule>  cefTRIAXone   IVPB 2000 milliGRAM(s) IV Intermittent every 12 hours  chlorhexidine 2% Cloths 1 Application(s) Topical daily  dextrose 5% + sodium chloride 0.45%. 1000 milliLiter(s) (75 mL/Hr) IV Continuous <Continuous>  escitalopram 20 milliGRAM(s) Oral daily  heparin   Injectable 5000 Unit(s) SubCutaneous every 8 hours  levothyroxine Injectable 75 MICROGram(s) IV Push at bedtime  QUEtiapine 25 milliGRAM(s) Oral at bedtime  simvastatin 20 milliGRAM(s) Oral at bedtime  valproate sodium  IVPB 250 milliGRAM(s) IV Intermittent every 12 hours  vancomycin  IVPB 1000 milliGRAM(s) IV Intermittent every 12 hours    MEDICATIONS  (PRN):

## 2023-01-21 NOTE — H&P ADULT - ASSESSMENT
Patient is an 83 y/o man with PMH of advanced Parkinson Disease, dementia, hypothyroidism, recent diagnosis of seizure disorder, and dyslipidemia, presents to the ED for altered mental status.    # Altered mental status  # Fever  # Leukocytosis, tachycardic on admission    Patient had similar presentation on her presentation  NH documentation of fever, as well as in the ED of 38.1  Febrile, tachycardic, and leukocytosis present on admission --> meets 3 criteria of sepsis  No focal symptoms for an infectious etiology, CXR did not show any infiltrates or consolidation  Aspiration pneumonia might be appear radiologically later, especially in the context of dehydration  Repeat CXR if warranted later during the hospital stay  UA is not significant  There was concern of meningitis on prior admission, and HCP refused LP to be done  Will cover empirically for meninigitis with ceftriaxone, vancomycin, ampicillin and acyclovir, despite being recently hospitalized and residing in a nursing home  Monitor renal function while on these antimicrobials  ID consult is in, for necessary de-escalation in therapy, or antibiotics substitution  Ordered a Duplex of lower extremities, as a DVT might cause low grade fevers  F/u blood cultures, temperature curve and leucocytosis on daily CBC  Patient is NPO currently, as she is not cooperative, if mental status does not improve, consider palliative and hospice evaluation, as daughter is refusing NG tube placement    # Hypernatremia    Possibly in the context of dehydration and decreased PO intake  Will start on D5 + 0.45% NaCl at a rate of 75 cc/h  Repeat BMP BID, do not aim for a correction above 6-8 mEq/mL     # Seizure disorder    Sharp waves on vEEG on latest admission  Valproic acid dosage was decreased while in the NH  Levels of 6 in the ED, which are sub-therapeutic  Will put patient back on depakote 250 BID    # Hypothyroidism    C/w  levothyroxine  Recent TSH of 1.7, no need for repeat    # Parkinson disease    C/w Sinemet 4 times daily, and Sinemet ER at bedtime    DVT ppx: heparin subq  GI ppx: not indicated  Diet: NPO for now  Activity: as tolerated  DNR/ DNI as per [facility] form

## 2023-01-21 NOTE — H&P ADULT - NSHPLABSRESULTS_GEN_ALL_CORE
LABS:                          14.3   11.40 )-----------( 174      ( 2023 16:12 )             46.1         156<H>  |  118<H>  |  28<H>  ----------------------------<  127<H>  3.9   |  31  |  0.8    Ca    9.0      2023 16:12    TPro  6.4  /  Alb  3.9  /  TBili  0.5  /  DBili  x   /  AST  21  /  ALT  24  /  AlkPhos  53      LIVER FUNCTIONS - ( 2023 16:12 )  Alb: 3.9 g/dL / Pro: 6.4 g/dL / ALK PHOS: 53 U/L / ALT: 24 U/L / AST: 21 U/L / GGT: x           PT/INR - ( 2023 16:12 )   PT: 13.00 sec;   INR: 1.14 ratio         PTT - ( 2023 16:12 )  PTT:25.6 sec  Urinalysis Basic - ( 2023 17:56 )    Color: Yellow / Appearance: Slightly Turbid / S.021 / pH: x  Gluc: x / Ketone: Negative  / Bili: Negative / Urobili: <2 mg/dL   Blood: x / Protein: 30 mg/dL / Nitrite: Negative   Leuk Esterase: Negative / RBC: 8 /HPF / WBC 1 /HPF   Sq Epi: x / Non Sq Epi: 7 /HPF / Bacteria: Negative

## 2023-01-22 LAB
ANION GAP SERPL CALC-SCNC: 8 MMOL/L — SIGNIFICANT CHANGE UP (ref 7–14)
BUN SERPL-MCNC: 18 MG/DL — SIGNIFICANT CHANGE UP (ref 10–20)
CALCIUM SERPL-MCNC: 8 MG/DL — LOW (ref 8.4–10.4)
CHLORIDE SERPL-SCNC: 114 MMOL/L — HIGH (ref 98–110)
CO2 SERPL-SCNC: 27 MMOL/L — SIGNIFICANT CHANGE UP (ref 17–32)
CREAT SERPL-MCNC: 0.7 MG/DL — SIGNIFICANT CHANGE UP (ref 0.7–1.5)
EGFR: 85 ML/MIN/1.73M2 — SIGNIFICANT CHANGE UP
GLUCOSE SERPL-MCNC: 113 MG/DL — HIGH (ref 70–99)
HCT VFR BLD CALC: 42 % — SIGNIFICANT CHANGE UP (ref 37–47)
HGB BLD-MCNC: 13.2 G/DL — SIGNIFICANT CHANGE UP (ref 12–16)
MAGNESIUM SERPL-MCNC: 2.2 MG/DL — SIGNIFICANT CHANGE UP (ref 1.8–2.4)
MCHC RBC-ENTMCNC: 31.1 PG — HIGH (ref 27–31)
MCHC RBC-ENTMCNC: 31.4 G/DL — LOW (ref 32–37)
MCV RBC AUTO: 99.1 FL — HIGH (ref 81–99)
NRBC # BLD: 0 /100 WBCS — SIGNIFICANT CHANGE UP (ref 0–0)
PLATELET # BLD AUTO: 146 K/UL — SIGNIFICANT CHANGE UP (ref 130–400)
POTASSIUM SERPL-MCNC: 3.3 MMOL/L — LOW (ref 3.5–5)
POTASSIUM SERPL-SCNC: 3.3 MMOL/L — LOW (ref 3.5–5)
PROCALCITONIN SERPL-MCNC: 0.05 NG/ML — SIGNIFICANT CHANGE UP (ref 0.02–0.1)
RBC # BLD: 4.24 M/UL — SIGNIFICANT CHANGE UP (ref 4.2–5.4)
RBC # FLD: 12.7 % — SIGNIFICANT CHANGE UP (ref 11.5–14.5)
SODIUM SERPL-SCNC: 149 MMOL/L — HIGH (ref 135–146)
WBC # BLD: 10.1 K/UL — SIGNIFICANT CHANGE UP (ref 4.8–10.8)
WBC # FLD AUTO: 10.1 K/UL — SIGNIFICANT CHANGE UP (ref 4.8–10.8)

## 2023-01-22 PROCEDURE — 95819 EEG AWAKE AND ASLEEP: CPT | Mod: 26

## 2023-01-22 PROCEDURE — 93970 EXTREMITY STUDY: CPT | Mod: 26

## 2023-01-22 PROCEDURE — 99233 SBSQ HOSP IP/OBS HIGH 50: CPT

## 2023-01-22 PROCEDURE — 99222 1ST HOSP IP/OBS MODERATE 55: CPT

## 2023-01-22 RX ORDER — POTASSIUM CHLORIDE 20 MEQ
20 PACKET (EA) ORAL ONCE
Refills: 0 | Status: COMPLETED | OUTPATIENT
Start: 2023-01-22 | End: 2023-01-22

## 2023-01-22 RX ADMIN — Medication 50 MILLIEQUIVALENT(S): at 16:43

## 2023-01-22 RX ADMIN — CEFTRIAXONE 100 MILLIGRAM(S): 500 INJECTION, POWDER, FOR SOLUTION INTRAMUSCULAR; INTRAVENOUS at 17:45

## 2023-01-22 RX ADMIN — Medication 200 GRAM(S): at 12:26

## 2023-01-22 RX ADMIN — Medication 110.4 MILLIGRAM(S): at 22:29

## 2023-01-22 RX ADMIN — Medication 200 GRAM(S): at 00:42

## 2023-01-22 RX ADMIN — Medication 250 MILLIGRAM(S): at 17:45

## 2023-01-22 RX ADMIN — HEPARIN SODIUM 5000 UNIT(S): 5000 INJECTION INTRAVENOUS; SUBCUTANEOUS at 13:39

## 2023-01-22 RX ADMIN — Medication 200 GRAM(S): at 17:45

## 2023-01-22 RX ADMIN — Medication 75 MICROGRAM(S): at 23:27

## 2023-01-22 RX ADMIN — Medication 52.5 MILLIGRAM(S): at 06:25

## 2023-01-22 RX ADMIN — HEPARIN SODIUM 5000 UNIT(S): 5000 INJECTION INTRAVENOUS; SUBCUTANEOUS at 22:31

## 2023-01-22 RX ADMIN — CHLORHEXIDINE GLUCONATE 1 APPLICATION(S): 213 SOLUTION TOPICAL at 12:26

## 2023-01-22 RX ADMIN — Medication 52.5 MILLIGRAM(S): at 17:45

## 2023-01-22 RX ADMIN — Medication 250 MILLIGRAM(S): at 07:11

## 2023-01-22 RX ADMIN — Medication 200 GRAM(S): at 23:20

## 2023-01-22 RX ADMIN — Medication 200 GRAM(S): at 06:24

## 2023-01-22 NOTE — CONSULT NOTE ADULT - SUBJECTIVE AND OBJECTIVE BOX
RODGER REYNA  83y, Female  Allergy: No Known Allergies      CHIEF COMPLAINT:   Fever (2023 20:08)      LOS  2d    HPI  HPI:  Patient is an 83 y/o man with PMH of advanced Parkinson Disease, dementia, hypothyroidism and dyslipidemia, presents to the ED for altered mental status.    Patient is a poor historian, and source of information is the patient's daughter and charting per nursing home.    Patient was recently admitted to the hospital for similar complaints, for which she was given empiric antibiotics and had a vEEG done showing intermittent sharp waves, and for which the patient was placed on valproic acid. Back then, there was a possibility of meningitis, but the daughter who is the health care proxy, did not wish any procedures to be done or any LP.    She presents to the hospital due to persistent altered mental status, lack of cooperation with the staff, and spiking fever to th 38.  Per daughter and chart, there is are no focal neurologic symptoms, chest pain, shortness of breath, cough, changes in bowel/ urinary habits.  To be noted that the dose of valproic acid was decreased in the NH, as they were concerned that it might be contributing to the altered mental status.    In the ED, the patient was HD stable, febrile with a temperature of 38.1C.  Labs were significant for leukocytosis of 11.4, Na= 156.  Valproic acid levels were of 6, UA was not significant, RVP is negative.  CXR did not show any acute cardiopulmonary pathology.    Patient received in the ED ceftriaxone and acyclovir and will be admitted for management. (2023 15:30)      INFECTIOUS DISEASE HISTORY:  ID consulted for meningitis   T100.8 P>90 , Hypertensive with fever    UA without significant pyuria     CXR no PNA    EEG + epileptiform  < from: CT Head No Cont (23 @ 18:47) >  1.  No CT evidence of acute intracranial pathology.  2.  Mild chronic microvascular ischemic changes.  Noted to have Hypernatremia    Patient was recently admitted to the hospital for similar complaints, for which she was given empiric antibiotics and had a vEEG done showing intermittent sharp waves, and for which the patient was placed on valproic acid. Back then, there was a possibility of meningitis, but the daughter who is the health care proxy, did not wish any procedures to be done or any LP.    Currently ordered for:  acyclovir IVPB 520 milliGRAM(s) IV Intermittent every 8 hours  ampicillin  IVPB      ampicillin  IVPB 2 Gram(s) IV Intermittent every 6 hours  cefTRIAXone   IVPB 2000 milliGRAM(s) IV Intermittent every 12 hours  vancomycin  IVPB 1000 milliGRAM(s) IV Intermittent every 12 hours      PMH  PAST MEDICAL & SURGICAL HISTORY:  Hypothyroid      Parkinson disease      Dyslipidemia      Dementia      No significant past surgical history          FAMILY HISTORY      SOCIAL HISTORY  Social History:  Lives w/ daughter. Retired (previously worked as PCA at [employer]). Ambulates w/ RW at baseline. Able to perform most ADL's at baseline, but relies on daughter for assistance. (2023 09:42)        ROS  ***    VITALS:  T(F): 97.6, Max: 100.8 (23 @ 16:03)  HR: 77  BP: 169/76  RR: 18Vital Signs Last 24 Hrs  T(C): 36.4 (2023 08:49), Max: 38.2 (2023 16:03)  T(F): 97.6 (2023 08:49), Max: 100.8 (2023 16:03)  HR: 77 (2023 08:49) (77 - 86)  BP: 169/76 (2023 08:49) (117/83 - 169/76)  BP(mean): --  RR: 18 (2023 08:49) (18 - 22)  SpO2: 98% (2023 08:49) (96% - 100%)    Parameters below as of 2023 08:49  Patient On (Oxygen Delivery Method): room air        PHYSICAL EXAM:  ***    TESTS & MEASUREMENTS:                        13.2   10.10 )-----------( 146      ( 2023 04:30 )             42.0         149<H>  |  114<H>  |  18  ----------------------------<  113<H>  3.3<L>   |  27  |  0.7    Ca    8.0<L>      2023 04:30  Mg     2.2         TPro  5.9<L>  /  Alb  3.7  /  TBili  0.6  /  DBili  x   /  AST  24  /  ALT  24  /  AlkPhos  51        LIVER FUNCTIONS - ( 2023 15:53 )  Alb: 3.7 g/dL / Pro: 5.9 g/dL / ALK PHOS: 51 U/L / ALT: 24 U/L / AST: 24 U/L / GGT: x           Urinalysis Basic - ( 2023 17:56 )    Color: Yellow / Appearance: Slightly Turbid / S.021 / pH: x  Gluc: x / Ketone: Negative  / Bili: Negative / Urobili: <2 mg/dL   Blood: x / Protein: 30 mg/dL / Nitrite: Negative   Leuk Esterase: Negative / RBC: 8 /HPF / WBC 1 /HPF   Sq Epi: x / Non Sq Epi: 7 /HPF / Bacteria: Negative        Culture - Blood (collected 23 @ 16:12)  Source: .Blood Blood-Peripheral  Preliminary Report (23 @ 23:01):    No growth to date.    Culture - Blood (collected 23 @ 16:12)  Source: .Blood Blood-Peripheral  Preliminary Report (23 @ 23:01):    No growth to date.    Culture - Blood (collected 23 @ 05:26)  Source: .Blood Blood  Final Report (23 @ 14:00):    No Growth Final    Culture - Blood (collected 23 @ 05:26)  Source: .Blood Blood  Final Report (23 @ 14:00):    No Growth Final    Culture - Urine (collected 23 @ 02:59)  Source: Catheterized Catheterized  Final Report (23 @ 19:26):    50,000 - 99,000 CFU/mL Enterococcus faecalis    <10,000 CFU/ml Normal Urogenital adam present  Organism: Enterococcus faecalis (23 @ 19:26)  Organism: Enterococcus faecalis (23 @ 19:26)      -  Ampicillin: S <=2 Predicts results to ampicillin/sulbactam, amoxacillin-clavulanate and  piperacillin-tazobactam.      -  Ciprofloxacin: S <=1      -  Levofloxacin: S <=1      -  Nitrofurantoin: S <=32 Should not be used to treat pyelonephritis.      -  Tetracycline: R >8      -  Vancomycin: S 2      Method Type: EULALIO        Blood Gas Venous - Lactate: 1.20 mmol/L (23 @ 15:24)      INFECTIOUS DISEASES TESTING  Procalcitonin, Serum: 0.05 ng/mL (23 @ 15:53)  Procalcitonin, Serum: 0.04 ng/mL (23 @ 07:25)      INFLAMMATORY MARKERS      RADIOLOGY & ADDITIONAL TESTS:  I have personally reviewed the last Chest xray  CXR  Xray Chest 1 View- PORTABLE-Urgent:   ACC: [id number] EXAM:  XR CHEST PORTABLE URGENT 1V   ORDERED BY: ANURADHA ZUÑIGA     PROCEDURE DATE:  2023          INTERPRETATION:  Clinical History / Reason for exam: Sepsis    Comparison : Chest radiograph 2023.    Technique/Positioning: Single frontal chest radiograph.    Findings:    Support devices: None.    Cardiac/mediastinum/hilum: Stable    Lung parenchyma/Pleura: No focal consolidation, effusion, or pneumothorax.    Skeleton/soft tissues: Stable osseous structures.    Impression:    No radiographic evidence of acute cardiopulmonary disease.    --- End of Report ---          SANCHO PULIDO MD; Resident Radiologist  This document has been electronically signed.  SIA BROWN MD; Attending Radiologist  This document has been electronically signed. 2023  9:18PM (23 @ 18:19)      CT      CARDIOLOGY TESTING      MEDICATIONS  acyclovir IVPB 520 IV Intermittent every 8 hours  ampicillin  IVPB     ampicillin  IVPB 2 IV Intermittent every 6 hours  carbidopa/levodopa  25/100 1 Oral four times a day  carbidopa/levodopa CR 50/200 1 Oral <User Schedule>  cefTRIAXone   IVPB 2000 IV Intermittent every 12 hours  chlorhexidine 2% Cloths 1 Topical daily  dextrose 5% + sodium chloride 0.45%. 1000 IV Continuous <Continuous>  escitalopram 20 Oral daily  heparin   Injectable 5000 SubCutaneous every 8 hours  levothyroxine Injectable 75 IV Push at bedtime  QUEtiapine 25 Oral at bedtime  simvastatin 20 Oral at bedtime  valproate sodium  IVPB 250 IV Intermittent every 12 hours  vancomycin  IVPB 1000 IV Intermittent every 12 hours        ANTIBIOTICS:  acyclovir IVPB 520 milliGRAM(s) IV Intermittent every 8 hours  ampicillin  IVPB      ampicillin  IVPB 2 Gram(s) IV Intermittent every 6 hours  cefTRIAXone   IVPB 2000 milliGRAM(s) IV Intermittent every 12 hours  vancomycin  IVPB 1000 milliGRAM(s) IV Intermittent every 12 hours      ALLERGIES:  No Known Allergies         RODGER REYNA  83y, Female  Allergy: No Known Allergies      CHIEF COMPLAINT:   Fever (2023 20:08)      LOS  2d    HPI  HPI:  Patient is an 81 y/o F with PMH of advanced Parkinson Disease, dementia, hypothyroidism and dyslipidemia, presents to the ED for altered mental status.    Patient is a poor historian, and source of information is the patient's daughter and charting per nursing home.    Patient was recently admitted to the hospital for similar complaints, for which she was given empiric antibiotics and had a vEEG done showing intermittent sharp waves, and for which the patient was placed on valproic acid. Back then, there was a possibility of meningitis, but the daughter who is the health care proxy, did not wish any procedures to be done or any LP.    She presents to the hospital due to persistent altered mental status, lack of cooperation with the staff, and spiking fever to th 38.  Per daughter and chart, there is are no focal neurologic symptoms, chest pain, shortness of breath, cough, changes in bowel/ urinary habits.  To be noted that the dose of valproic acid was decreased in the NH, as they were concerned that it might be contributing to the altered mental status.    In the ED, the patient was HD stable, febrile with a temperature of 38.1C.  Labs were significant for leukocytosis of 11.4, Na= 156.  Valproic acid levels were of 6, UA was not significant, RVP is negative.  CXR did not show any acute cardiopulmonary pathology.    Patient received in the ED ceftriaxone and acyclovir and will be admitted for management. (2023 15:30)      INFECTIOUS DISEASE HISTORY:  ID consulted for meningitis   T100.8 P>90 , Hypertensive with fever    UA without significant pyuria     CXR no PNA    EEG + epileptiform  < from: CT Head No Cont (23 @ 18:47) >  1.  No CT evidence of acute intracranial pathology.  2.  Mild chronic microvascular ischemic changes.  Noted to have Hypernatremia    Patient was recently admitted to the hospital for similar complaints, for which she was given empiric antibiotics and had a vEEG done showing intermittent sharp waves, and for which the patient was placed on valproic acid. Back then, there was a possibility of meningitis, but the daughter who is the health care proxy, did not wish any procedures to be done or any LP.    cannot obtain further history from the patient secondary to altered mental status or sedation       Currently ordered for:  acyclovir IVPB 520 milliGRAM(s) IV Intermittent every 8 hours  ampicillin  IVPB      ampicillin  IVPB 2 Gram(s) IV Intermittent every 6 hours  cefTRIAXone   IVPB 2000 milliGRAM(s) IV Intermittent every 12 hours  vancomycin  IVPB 1000 milliGRAM(s) IV Intermittent every 12 hours      PMH  PAST MEDICAL & SURGICAL HISTORY:  Hypothyroid      Parkinson disease      Dyslipidemia      Dementia      No significant past surgical history          FAMILY HISTORY  non-contributory     SOCIAL HISTORY  Social History:  Lives w/ daughter. 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 Able to perform most ADL's at baseline, but relies on daughter for assistance. (2023 09:42)        ROS  unable to obtain history secondary to patient's mental status and/or sedation     VITALS:  T(F): 97.6, Max: 100.8 (23 @ 16:03)  HR: 77  BP: 169/76  RR: 18Vital Signs Last 24 Hrs  T(C): 36.4 (2023 08:49), Max: 38.2 (2023 16:03)  T(F): 97.6 (2023 08:49), Max: 100.8 (2023 16:03)  HR: 77 (2023 08:49) (77 - 86)  BP: 169/76 (2023 08:49) (117/83 - 169/76)  BP(mean): --  RR: 18 (2023 08:49) (18 - 22)  SpO2: 98% (2023 08:49) (96% - 100%)    Parameters below as of 2023 08:49  Patient On (Oxygen Delivery Method): room air        PHYSICAL EXAM:  Gen: chronically ill appearing , rigid  HEENT: Normocephalic, atraumatic  Neck: supple, no lymphadenopathy   CV: Regular rate & regular rhythm  Lungs: decreased BS at bases, no fremitus  Abdomen: Soft, BS present  Ext: Warm, well perfused  Neuro: non focal, not following commands  Skin: no rash, no erythema  Lines: no phlebitis     TESTS & MEASUREMENTS:                        13.2   10.10 )-----------( 146      ( 2023 04:30 )             42.0         149<H>  |  114<H>  |  18  ----------------------------<  113<H>  3.3<L>   |  27  |  0.7    Ca    8.0<L>      2023 04:30  Mg     2.2         TPro  5.9<L>  /  Alb  3.7  /  TBili  0.6  /  DBili  x   /  AST  24  /  ALT  24  /  AlkPhos  51        LIVER FUNCTIONS - ( 2023 15:53 )  Alb: 3.7 g/dL / Pro: 5.9 g/dL / ALK PHOS: 51 U/L / ALT: 24 U/L / AST: 24 U/L / GGT: x           Urinalysis Basic - ( 2023 17:56 )    Color: Yellow / Appearance: Slightly Turbid / S.021 / pH: x  Gluc: x / Ketone: Negative  / Bili: Negative / Urobili: <2 mg/dL   Blood: x / Protein: 30 mg/dL / Nitrite: Negative   Leuk Esterase: Negative / RBC: 8 /HPF / WBC 1 /HPF   Sq Epi: x / Non Sq Epi: 7 /HPF / Bacteria: Negative        Culture - Blood (collected 23 @ 16:12)  Source: .Blood Blood-Peripheral  Preliminary Report (23 @ 23:01):    No growth to date.    Culture - Blood (collected 23 @ 16:12)  Source: .Blood Blood-Peripheral  Preliminary Report (23 @ 23:01):    No growth to date.    Culture - Blood (collected 23 @ 05:26)  Source: .Blood Blood  Final Report (23 @ 14:00):    No Growth Final    Culture - Blood (collected 23 @ 05:26)  Source: .Blood Blood  Final Report (23 @ 14:00):    No Growth Final    Culture - Urine (collected 23 @ 02:59)  Source: Catheterized Catheterized  Final Report (23 @ 19:26):    50,000 - 99,000 CFU/mL Enterococcus faecalis    <10,000 CFU/ml Normal Urogenital adam present  Organism: Enterococcus faecalis (23 @ 19:26)  Organism: Enterococcus faecalis (23 @ 19:26)      -  Ampicillin: S <=2 Predicts results to ampicillin/sulbactam, amoxacillin-clavulanate and  piperacillin-tazobactam.      -  Ciprofloxacin: S <=1      -  Levofloxacin: S <=1      -  Nitrofurantoin: S <=32 Should not be used to treat pyelonephritis.      -  Tetracycline: R >8      -  Vancomycin: S 2      Method Type: EULALIO        Blood Gas Venous - Lactate: 1.20 mmol/L (23 @ 15:24)      INFECTIOUS DISEASES TESTING  Procalcitonin, Serum: 0.05 ng/mL (23 @ 15:53)  Procalcitonin, Serum: 0.04 ng/mL (23 @ 07:25)      INFLAMMATORY MARKERS      RADIOLOGY & ADDITIONAL TESTS:  I have personally reviewed the last Chest xray  CXR  Xray Chest 1 View- PORTABLE-Urgent:   ACC: [id number] EXAM:  XR CHEST PORTABLE URGENT 1V   ORDERED BY: ANURADHA ZUÑIGA     PROCEDURE DATE:  2023          INTERPRETATION:  Clinical History / Reason for exam: Sepsis    Comparison : Chest radiograph 2023.    Technique/Positioning: Single frontal chest radiograph.    Findings:    Support devices: None.    Cardiac/mediastinum/hilum: Stable    Lung parenchyma/Pleura: No focal consolidation, effusion, or pneumothorax.    Skeleton/soft tissues: Stable osseous structures.    Impression:    No radiographic evidence of acute cardiopulmonary disease.    --- End of Report ---          SANCHO PULIDO MD; Resident Radiologist  This document has been electronically signed.  SIA BROWN MD; Attending Radiologist  This document has been electronically signed. 2023  9:18PM (23 @ 18:19)      CT      CARDIOLOGY TESTING      MEDICATIONS  acyclovir IVPB 520 IV Intermittent every 8 hours  ampicillin  IVPB     ampicillin  IVPB 2 IV Intermittent every 6 hours  carbidopa/levodopa  25/100 1 Oral four times a day  carbidopa/levodopa CR 50/200 1 Oral <User Schedule>  cefTRIAXone   IVPB 2000 IV Intermittent every 12 hours  chlorhexidine 2% Cloths 1 Topical daily  dextrose 5% + sodium chloride 0.45%. 1000 IV Continuous <Continuous>  escitalopram 20 Oral daily  heparin   Injectable 5000 SubCutaneous every 8 hours  levothyroxine Injectable 75 IV Push at bedtime  QUEtiapine 25 Oral at bedtime  simvastatin 20 Oral at bedtime  valproate sodium  IVPB 250 IV Intermittent every 12 hours  vancomycin  IVPB 1000 IV Intermittent every 12 hours        ANTIBIOTICS:  acyclovir IVPB 520 milliGRAM(s) IV Intermittent every 8 hours  ampicillin  IVPB      ampicillin  IVPB 2 Gram(s) IV Intermittent every 6 hours  cefTRIAXone   IVPB 2000 milliGRAM(s) IV Intermittent every 12 hours  vancomycin  IVPB 1000 milliGRAM(s) IV Intermittent every 12 hours      ALLERGIES:  No Known Allergies

## 2023-01-22 NOTE — PROGRESS NOTE ADULT - ASSESSMENT
82F NHR w/ PMH: Advanced Parkinson Disease, Parkinson's Dementia, Hypothyroidism, recent diagnosis of Seizure Disorder and dyslipidemia p/w AMS and Fever to the ER.       # Sepsis POA (fever, leukocytosis, tachycardia on admission)   # Metabolic Encephalopathy (POA)   # Advanced Parkinson and Advanced Dementia   # Severe Protein Calorie Malnutrition (Temporal Wasting / cachexia) likely FTT   # Hypernatremia due to dehydration / fevers / sepsis  # Hypokalemia - c/w correction f/u mg level     Patient had similar presentation on her presentation in beginning of Jan 2023   NH documentation of fever, as well as in the ED of 38.1  Febrile, tachycardic, and leukocytosis present on admission --> meets 3 criteria of sepsis  No focal symptoms for an infectious etiology, CXR did not show any infiltrates or consolidation  Aspiration pneumonia might be appear radiologically later, especially in the context of dehydration  Repeat CXR if warranted later during the hospital stay  UA is not significant  There was concern of meningitis on prior admission, and HCP refused LP to be done  Will cover empirically for meninigitis with ceftriaxone, vancomycin, ampicillin and acyclovir, despite being recently hospitalized and residing in a nursing home  Monitor renal function while on these antimicrobials  ID consult is in, for necessary de-escalation in therapy, or antibiotics substitution  Ordered a Duplex of lower extremities, as a DVT might cause low grade fevers  F/u blood cultures, temperature curve and leucocytosis on daily CBC  Patient is NPO - Daughter and Son refusing NGT   Pt would benefit from Hospice Care if HCP is interested     Possibly in the context of dehydration and decreased PO intake  c/w D5 + 0.45% NaCl at a rate of 75 cc/h  Repeat BMP BID, do not aim for a correction above 6-8 mEq/mL     # Seizure disorder  - f/u Neuro recs   Sharp waves on vEEG on latest admission  Valproic acid dosage was decreased while in the NH  Levels of 6 in the ED, which are sub-therapeutic  Will put patient back on depakote 250 BID    # Hypothyroidism  C/w  levothyroxine  Recent TSH of 1.7, no need for repeat    # Parkinson disease  C/w Sinemet 4 times daily, and Sinemet ER at bedtime    DVT ppx: heparin subq  GI ppx: not indicated  Diet: NPO for now f/u SLP eval   Activity: as tolerated    DNR/ DNI as per [facility] form  POOR PROGNOSIS     Handoff Note:  Family: Case discussed at bedside   Pending: ID eval / cxs / K and Na correction / SLP / Consult Nutrition / Pt eval   Dispo: NHR

## 2023-01-22 NOTE — PATIENT PROFILE ADULT - FALL HARM RISK - HARM RISK INTERVENTIONS
Assistance with ambulation/Assistance OOB with selected safe patient handling equipment/Communicate Risk of Fall with Harm to all staff/Discuss with provider need for PT consult/Monitor gait and stability/Reinforce activity limits and safety measures with patient and family/Tailored Fall Risk Interventions/Visual Cue: Yellow wristband and red socks/Bed in lowest position, wheels locked, appropriate side rails in place/Call bell, personal items and telephone in reach/Instruct patient to call for assistance before getting out of bed or chair/Non-slip footwear when patient is out of bed/[place] to call system/Physically safe environment - no spills, clutter or unnecessary equipment/Purposeful Proactive Rounding/Room/bathroom lighting operational, light cord in reach

## 2023-01-22 NOTE — PHARMACOTHERAPY INTERVENTION NOTE - COMMENTS
As per policy, ordered a vancomycin trough level prior to @16:00 on 1/23 dose of vancomycin (if patient is still receiving vancomycin at that time).     Jules Torres, PharmD  Clinical Pharmacy Specialist, Infectious Diseases  Tele-Antimicrobial Stewardship Program (Tele-ASP)  Tele-ASP Phone: [phone]

## 2023-01-22 NOTE — CONSULT NOTE ADULT - ASSESSMENT
ASSESSMENT  81 y/o man with PMH of advanced Parkinson Disease, dementia, hypothyroidism and dyslipidemia, presents to the ED for altered mental status.  ID consulted for meningitis  Patient was recently admitted to the hospital for similar complaints, for which she was given empiric antibiotics and had a vEEG done showing intermittent sharp waves, and for which the patient was placed on valproic acid. Back then, there was a possibility of meningitis, but the daughter who is the health care proxy, did not wish any procedures to be done or any LP.    IMPRESSION  #Rule out sepsis on admission T100.8 P>90 , Hypertensive with fever    UA without significant pyuria     CXR no PNA    EEG + epileptiform  < from: CT Head No Cont (01.21.23 @ 18:47) >  1.  No CT evidence of acute intracranial pathology.  2.  Mild chronic microvascular ischemic changes.  #Hypernatremia  Creatinine, Serum: 0.7 (01-22-23 @ 04:30)    Weight (kg): 54.4 (01-20-23 @ 14:29)    RECOMMENDATIONS  This is an incomplete consult note. All final recommendations to follow after interview and examination of the patient. Please follow recommendations noted below.    If any questions, please call or send a message on [email or web address] Teams  Please continue to update ID with any pertinent new laboratory or radiographic findings  #[id number]   ASSESSMENT  83 y/o man with PMH of advanced Parkinson Disease, dementia, hypothyroidism and dyslipidemia, presents to the ED for altered mental status.  ID consulted for meningitis  Patient was recently admitted to the hospital for similar complaints, for which she was given empiric antibiotics and had a vEEG done showing intermittent sharp waves, and for which the patient was placed on valproic acid. Back then, there was a possibility of meningitis, but the daughter who is the health care proxy, did not wish any procedures to be done or any LP.    IMPRESSION  #Rule out sepsis on admission T100.8 P>90 , Hypertensive with fever. Recently AED dose decreased- suspect fevers related to breakthrough seizures.    Doubt meningitis, suspect fevers secondary to seizure . Recent similar presentation. Daughter refused LP. No CT temporal findings to suggest HSV. Meningitis/encephalitis would not be relapsing     UA without significant pyuria     CXR no PNA    EEG + epileptiform  < from: CT Head No Cont (01.21.23 @ 18:47) >  1.  No CT evidence of acute intracranial pathology.  2.  Mild chronic microvascular ischemic changes.  #Hypernatremia  Creatinine, Serum: 0.7 (01-22-23 @ 04:30)    Weight (kg): 54.4 (01-20-23 @ 14:29)    RECOMMENDATIONS  - Overall doubt meningitis given recent presentation with similar symptoms and recent decreased in AED dose  - f/u BCX  - fevers likely related to seizures as hypertensive and No leukocytosis   - monitor off antimicrobials  - would recommend LP for part of neuro workup     If any questions, please call or send a message on [employer] Teams  Please continue to update ID with any pertinent new laboratory or radiographic findings  #[id number]   ASSESSMENT  83 y/o F with PMH of advanced Parkinson Disease, dementia, hypothyroidism and dyslipidemia, presents to the ED for altered mental status.  ID consulted for meningitis  Patient was recently admitted to the hospital for similar complaints, for which she was given empiric antibiotics and had a vEEG done showing intermittent sharp waves, and for which the patient was placed on valproic acid. Back then, there was a possibility of meningitis, but the daughter who is the health care proxy, did not wish any procedures to be done or any LP.    IMPRESSION  #Rule out sepsis on admission T100.8 P>90 , Hypertensive with fever. Recently AED dose decreased- suspect fevers related to breakthrough seizures.    Doubt meningitis, suspect fevers secondary to seizure . Recent similar presentation. Daughter refused LP. No CT temporal findings to suggest HSV. Meningitis/encephalitis would not be relapsing     UA without significant pyuria     CXR no PNA    EEG + epileptiform  < from: CT Head No Cont (01.21.23 @ 18:47) >  1.  No CT evidence of acute intracranial pathology.  2.  Mild chronic microvascular ischemic changes.  #Hypernatremia  Creatinine, Serum: 0.7 (01-22-23 @ 04:30)    Weight (kg): 54.4 (01-20-23 @ 14:29)    RECOMMENDATIONS  - Overall doubt meningitis given recent presentation with similar symptoms and recent decreased in AED dose  - f/u BCX  - fevers likely related to seizures as hypertensive and No leukocytosis   - monitor off antimicrobials  - would recommend LP for part of neuro workup     If any questions, please call or send a message on [facility] Teams  Please continue to update ID with any pertinent new laboratory or radiographic findings  #[phone]

## 2023-01-23 DIAGNOSIS — F03.90 UNSPECIFIED DEMENTIA, UNSPECIFIED SEVERITY, WITHOUT BEHAVIORAL DISTURBANCE, PSYCHOTIC DISTURBANCE, MOOD DISTURBANCE, AND ANXIETY: ICD-10-CM

## 2023-01-23 DIAGNOSIS — R13.10 DYSPHAGIA, UNSPECIFIED: ICD-10-CM

## 2023-01-23 DIAGNOSIS — J18.9 PNEUMONIA, UNSPECIFIED ORGANISM: ICD-10-CM

## 2023-01-23 DIAGNOSIS — Z51.5 ENCOUNTER FOR PALLIATIVE CARE: ICD-10-CM

## 2023-01-23 DIAGNOSIS — G20 PARKINSON'S DISEASE: ICD-10-CM

## 2023-01-23 DIAGNOSIS — R53.81 OTHER MALAISE: ICD-10-CM

## 2023-01-23 LAB
-  AMIKACIN: SIGNIFICANT CHANGE UP
-  AMOXICILLIN/CLAVULANIC ACID: SIGNIFICANT CHANGE UP
-  AMPICILLIN/SULBACTAM: SIGNIFICANT CHANGE UP
-  AMPICILLIN: SIGNIFICANT CHANGE UP
-  AZTREONAM: SIGNIFICANT CHANGE UP
-  CEFAZOLIN: SIGNIFICANT CHANGE UP
-  CEFEPIME: SIGNIFICANT CHANGE UP
-  CEFOXITIN: SIGNIFICANT CHANGE UP
-  CEFTRIAXONE: SIGNIFICANT CHANGE UP
-  CEFUROXIME: SIGNIFICANT CHANGE UP
-  CIPROFLOXACIN: SIGNIFICANT CHANGE UP
-  ERTAPENEM: SIGNIFICANT CHANGE UP
-  GENTAMICIN: SIGNIFICANT CHANGE UP
-  IMIPENEM: SIGNIFICANT CHANGE UP
-  LEVOFLOXACIN: SIGNIFICANT CHANGE UP
-  MEROPENEM: SIGNIFICANT CHANGE UP
-  NITROFURANTOIN: SIGNIFICANT CHANGE UP
-  PIPERACILLIN/TAZOBACTAM: SIGNIFICANT CHANGE UP
-  TOBRAMYCIN: SIGNIFICANT CHANGE UP
-  TRIMETHOPRIM/SULFAMETHOXAZOLE: SIGNIFICANT CHANGE UP
ANION GAP SERPL CALC-SCNC: 7 MMOL/L — SIGNIFICANT CHANGE UP (ref 7–14)
BUN SERPL-MCNC: 13 MG/DL — SIGNIFICANT CHANGE UP (ref 10–20)
CALCIUM SERPL-MCNC: 8.1 MG/DL — LOW (ref 8.4–10.4)
CHLORIDE SERPL-SCNC: 113 MMOL/L — HIGH (ref 98–110)
CO2 SERPL-SCNC: 28 MMOL/L — SIGNIFICANT CHANGE UP (ref 17–32)
CREAT SERPL-MCNC: 0.7 MG/DL — SIGNIFICANT CHANGE UP (ref 0.7–1.5)
CULTURE RESULTS: SIGNIFICANT CHANGE UP
EGFR: 85 ML/MIN/1.73M2 — SIGNIFICANT CHANGE UP
GLUCOSE SERPL-MCNC: 120 MG/DL — HIGH (ref 70–99)
HCT VFR BLD CALC: 40.3 % — SIGNIFICANT CHANGE UP (ref 37–47)
HGB BLD-MCNC: 13 G/DL — SIGNIFICANT CHANGE UP (ref 12–16)
MAGNESIUM SERPL-MCNC: 2 MG/DL — SIGNIFICANT CHANGE UP (ref 1.8–2.4)
MCHC RBC-ENTMCNC: 31.3 PG — HIGH (ref 27–31)
MCHC RBC-ENTMCNC: 32.3 G/DL — SIGNIFICANT CHANGE UP (ref 32–37)
MCV RBC AUTO: 97.1 FL — SIGNIFICANT CHANGE UP (ref 81–99)
METHOD TYPE: SIGNIFICANT CHANGE UP
NRBC # BLD: 0 /100 WBCS — SIGNIFICANT CHANGE UP (ref 0–0)
ORGANISM # SPEC MICROSCOPIC CNT: SIGNIFICANT CHANGE UP
ORGANISM # SPEC MICROSCOPIC CNT: SIGNIFICANT CHANGE UP
PLATELET # BLD AUTO: 153 K/UL — SIGNIFICANT CHANGE UP (ref 130–400)
POTASSIUM SERPL-MCNC: 3 MMOL/L — LOW (ref 3.5–5)
POTASSIUM SERPL-SCNC: 3 MMOL/L — LOW (ref 3.5–5)
RBC # BLD: 4.15 M/UL — LOW (ref 4.2–5.4)
RBC # FLD: 12.4 % — SIGNIFICANT CHANGE UP (ref 11.5–14.5)
SODIUM SERPL-SCNC: 148 MMOL/L — HIGH (ref 135–146)
SPECIMEN SOURCE: SIGNIFICANT CHANGE UP
VALPROATE SERPL-MCNC: 37 UG/ML — LOW (ref 50–100)
VANCOMYCIN FLD-MCNC: 16.7 UG/ML — HIGH (ref 5–10)
WBC # BLD: 9.62 K/UL — SIGNIFICANT CHANGE UP (ref 4.8–10.8)
WBC # FLD AUTO: 9.62 K/UL — SIGNIFICANT CHANGE UP (ref 4.8–10.8)

## 2023-01-23 PROCEDURE — 99232 SBSQ HOSP IP/OBS MODERATE 35: CPT

## 2023-01-23 PROCEDURE — 99223 1ST HOSP IP/OBS HIGH 75: CPT

## 2023-01-23 RX ORDER — POTASSIUM CHLORIDE 20 MEQ
20 PACKET (EA) ORAL
Refills: 0 | Status: COMPLETED | OUTPATIENT
Start: 2023-01-23 | End: 2023-01-23

## 2023-01-23 RX ADMIN — Medication 200 GRAM(S): at 05:03

## 2023-01-23 RX ADMIN — HEPARIN SODIUM 5000 UNIT(S): 5000 INJECTION INTRAVENOUS; SUBCUTANEOUS at 21:57

## 2023-01-23 RX ADMIN — Medication 200 GRAM(S): at 17:13

## 2023-01-23 RX ADMIN — Medication 110.4 MILLIGRAM(S): at 11:48

## 2023-01-23 RX ADMIN — HEPARIN SODIUM 5000 UNIT(S): 5000 INJECTION INTRAVENOUS; SUBCUTANEOUS at 05:02

## 2023-01-23 RX ADMIN — Medication 250 MILLIGRAM(S): at 17:15

## 2023-01-23 RX ADMIN — HEPARIN SODIUM 5000 UNIT(S): 5000 INJECTION INTRAVENOUS; SUBCUTANEOUS at 14:49

## 2023-01-23 RX ADMIN — CEFTRIAXONE 100 MILLIGRAM(S): 500 INJECTION, POWDER, FOR SOLUTION INTRAMUSCULAR; INTRAVENOUS at 17:13

## 2023-01-23 RX ADMIN — Medication 75 MICROGRAM(S): at 21:57

## 2023-01-23 RX ADMIN — Medication 50 MILLIEQUIVALENT(S): at 15:00

## 2023-01-23 RX ADMIN — Medication 52.5 MILLIGRAM(S): at 05:03

## 2023-01-23 RX ADMIN — Medication 200 GRAM(S): at 11:48

## 2023-01-23 RX ADMIN — CHLORHEXIDINE GLUCONATE 1 APPLICATION(S): 213 SOLUTION TOPICAL at 11:52

## 2023-01-23 RX ADMIN — CEFTRIAXONE 100 MILLIGRAM(S): 500 INJECTION, POWDER, FOR SOLUTION INTRAMUSCULAR; INTRAVENOUS at 05:02

## 2023-01-23 RX ADMIN — Medication 50 MILLIEQUIVALENT(S): at 11:48

## 2023-01-23 RX ADMIN — Medication 200 GRAM(S): at 23:31

## 2023-01-23 RX ADMIN — Medication 50 MILLIEQUIVALENT(S): at 10:10

## 2023-01-23 RX ADMIN — Medication 110.4 MILLIGRAM(S): at 20:03

## 2023-01-23 RX ADMIN — Medication 52.5 MILLIGRAM(S): at 17:15

## 2023-01-23 RX ADMIN — Medication 110.4 MILLIGRAM(S): at 03:45

## 2023-01-23 NOTE — PROGRESS NOTE ADULT - ASSESSMENT
Impression:  Patient is an 81 y/o man with PMH of advanced Parkinson Disease, dementia, hypothyroidism and dyslipidemia, presents to the ED for altered mental status.  Patient was recently admitted to the hospital for similar complaints, for which she was given empiric antibiotics and had a vEEG done showing intermittent sharp waves, and for which the patient was placed on valproic acid. Back then, there was a possibility of meningitis, but the daughter who is the health care proxy refused LP. She presents to the hospital due to persistent altered mental status, worsening increased tone to bilateral UE LE and fever. Depakote decreased in the NH for drowsiness. In the ED, the patient was HD stable, febrile with a temperature of 100.5. Labs were significant for leukocytosis of 11.4, Na= 156.     Patient currently afebrile with resolving hypernatremia (Na 148). Sinemet has been held due to NPO status, per note patient's HCP/family do not want NG Tube. Neurological exam notable for diffuse bilateral UE/LE hypertonicity and hyperreflexia consistent with known advanced Parkinson's Disease. Recommend restarting all Parkinson's medications.    RECOMMENDATIONS  - Stop Seroquel  - place NG Tube and restart all Parkinson's medications  - can try 24 hour vEEG  - LP Impression:  Patient is an 83 y/o man with PMH of advanced Parkinson Disease, dementia, hypothyroidism and dyslipidemia, presents to the ED for altered mental status.  Patient was recently admitted to the hospital for similar complaints, for which she was given empiric antibiotics and had a vEEG done showing intermittent sharp waves, and for which the patient was placed on valproic acid. Back then, there was a possibility of meningitis, but the daughter who is the health care proxy refused LP. She presents to the hospital due to persistent altered mental status, worsening increased tone to bilateral UE LE and fever. Depakote decreased in the NH for drowsiness. In the ED, the patient was HD stable, febrile with a temperature of 100.5. Labs were significant for leukocytosis of 11.4, Na= 156.     Patient currently afebrile with resolving hypernatremia (Na 148). Sinemet has been held due to NPO status, per note patient's HCP/family do not want NG Tube. Neurological exam notable for diffuse bilateral UE/LE hypertonicity and hyperreflexia consistent with known advanced Parkinson's Disease. Recommend restarting all Parkinson's medications.    RECOMMENDATIONS  - Stop Seroquel  - place NG Tube and restart all Parkinson's medications  - can try 24 hour vEEG  - consider LP based on vEEG results

## 2023-01-23 NOTE — CONSULT NOTE ADULT - SUBJECTIVE AND OBJECTIVE BOX
HPI:  Patient is an 81 y/o man with PMH of advanced Parkinson Disease, dementia, hypothyroidism and dyslipidemia, presents to the ED for altered mental status.    Patient is a poor historian, and source of information is the patient's daughter and charting per nursing home.    Patient was recently admitted to the hospital for similar complaints, for which she was given empiric antibiotics and had a vEEG done showing intermittent sharp waves, and for which the patient was placed on valproic acid. Back then, there was a possibility of meningitis, but the daughter who is the health care proxy, did not wish any procedures to be done or any LP.    She presents to the hospital due to persistent altered mental status, lack of cooperation with the staff, and spiking fever to th 38.  Per daughter and chart, there is are no focal neurologic symptoms, chest pain, shortness of breath, cough, changes in bowel/ urinary habits.  To be noted that the dose of valproic acid was decreased in the NH, as they were concerned that it might be contributing to the altered mental status.    Palliative care consulted as family requesting a comfort based approach to care, no NGTs      PERTINENT PM/SXH:   Parkinsonian syndrome    Hypothyroid    Parkinson disease    Back pain    Dyslipidemia    Dementia      No significant past surgical history      FAMILY HISTORY:    ITEMS NOT CHECKED ARE NOT PRESENT    SOCIAL HISTORY:   Significant other/partner[ ]  Children[x ]  Anabaptist/Spirituality:  Substance hx:  [ ]   Tobacco hx:  [ ]   Alcohol hx: [ ]   Living Situation: [ x]Home  [ ]Long term care  [ ]Rehab [ ]Other  Home Services: [ ] HHA [ ] Visting RN [ ] Hospice  Occupation:  Home Opioid hx:  [ ] Y [ x] N [ x] I-Stop Reference No:  This report was requested by: Fern Delgado | Reference #: [id number]    You have not added a RUTH number. Keeping your RUTH number(s) up to date on the My RUTH # page will enable the separation of your prescriptions from others in the search results.    Others' Prescriptions  Patient Name: Janine OrdoñezBirth Date: 1940  Address: [address], [place] [address]Zrv: Female  Rx Written	Rx Dispensed	Drug	Quantity	Days Supply	Prescriber Name	Prescriber Ruth #	Payment Method	Dispenser  05/13/2022	05/14/2022	tramadol hcl 50 mg tablet	30	30	Andrea Rollins S,	[id number]	[facility] #[id number]  * - Drugs marked with an asterisk are compound drugs. If the compound drug is made up of more than one controlled substance, then each controlled substance will be a separate row in the  ADVANCE DIRECTIVES:    [ ] Full Code [x ] DNR/DNI  MOLST  [ ]  Living Will  [ ]   DECISION MAKER(s):  [x ] Health Care Proxy(s)  [ ] Surrogate(s)  [ ] Guardian           Name(s): Phone Number(s):  Answers: Emergency Contact Name Maritza,  Answers: Emergency Contact Phone # [phone],  Answers: Emergency Contact Relationship daughter  BASELINE (I)ADL(s) (prior to admission):  [place]: [ ]Total  [ ] Moderate [ ]Dependent  Palliative Performance Status Version 2:         %    http://npcrc.org/files/news/palliative_performance_scale_ppsv2.pdf    Allergies    No Known Allergies    Intolerances    MEDICATIONS  (STANDING):  acyclovir IVPB 520 milliGRAM(s) IV Intermittent every 8 hours  ampicillin  IVPB      ampicillin  IVPB 2 Gram(s) IV Intermittent every 6 hours  carbidopa/levodopa  25/100 1 Tablet(s) Oral four times a day  carbidopa/levodopa CR 50/200 1 Tablet(s) Oral <User Schedule>  cefTRIAXone   IVPB 2000 milliGRAM(s) IV Intermittent every 12 hours  chlorhexidine 2% Cloths 1 Application(s) Topical daily  dextrose 5% + sodium chloride 0.45%. 1000 milliLiter(s) (75 mL/Hr) IV Continuous <Continuous>  escitalopram 20 milliGRAM(s) Oral daily  heparin   Injectable 5000 Unit(s) SubCutaneous every 8 hours  levothyroxine Injectable 75 MICROGram(s) IV Push at bedtime  simvastatin 20 milliGRAM(s) Oral at bedtime  valproate sodium  IVPB 250 milliGRAM(s) IV Intermittent every 12 hours  vancomycin  IVPB 1000 milliGRAM(s) IV Intermittent every 12 hours    MEDICATIONS  (PRN):  acetaminophen  Suppository .. 650 milliGRAM(s) Rectal every 8 hours PRN Temp greater or equal to 38C (100.4F)    PRESENT SYMPTOMS: [ ]Unable to obtain due to poor mentation   Source if other than patient:  [ ]Family   [ ]Team     Pain: [ ]yes [ ]no  QOL impact -   Location -                    Aggravating factors -  Quality -  Radiation -  Timing-  Severity (0-10 scale):  Minimal acceptable level (0-10 scale):     CPOT:    https://www.[facility].org/getattachment/[email or web address]/Critical-Care-Pain-Observation-Tool-(CPOT)      PAIN AD Score:     http://geriatrictoolkit.[email or web address]/cog/painad.pdf (press ctrl +  left click to view)      Dyspnea:                           [ ]Mild [ ]Moderate [ ]Severe [ ]None  Anxiety:                             [ ]Mild [ ]Moderate [ ]Severe [ ]None  Fatigue:                             [ ]Mild [ ]Moderate [ ]Severe [ ]None  Nausea:                             [ ]Mild [ ]Moderate [ ]Severe [ ]None  Loss of appetite:              [ ]Mild [ ]Moderate [ ]Severe [ ]None  Constipation:                    [ ]Mild [ ]Moderate [ ]Severe [ ]None    Other Symptoms:  [ ]All other review of systems negative     Palliative Performance Status Version 2:         %    http://[facility].org/files/news/palliative_performance_scale_ppsv2.pdf  PHYSICAL EXAM:  Vital Signs Last 24 Hrs  T(C): 36.2 (23 Jan 2023 12:00), Max: 37.7 (22 Jan 2023 16:13)  T(F): 97.1 (23 Jan 2023 12:00), Max: 99.8 (22 Jan 2023 16:13)  HR: 81 (23 Jan 2023 12:00) (77 - 85)  BP: 174/74 (23 Jan 2023 04:49) (157/77 - 174/74)  BP(mean): --  RR: 18 (23 Jan 2023 12:00) (18 - 18)  SpO2: 97% (23 Jan 2023 12:00) (95% - 97%)    Parameters below as of 22 Jan 2023 16:13  Patient On (Oxygen Delivery Method): room air     I&O's Summary    22 Jan 2023 07:01  -  23 Jan 2023 07:00  --------------------------------------------------------  IN: 0 mL / OUT: 600 mL / NET: -600 mL        GENERAL:  [ ] No acute distress [ ]Lethargic  [ ]Unarousable  [ ]Verbal  [ ]Non-Verbal [ ]Cachexia    BEHAVIORAL/PSYCH:  [ ]Alert and Oriented x  [ ] Anxiety [ ] Delirium [ ] Agitation [ ] Calm   EYES: [ ] No scleral icterus [ ] Scleral icterus [ ] Closed  ENMT:  [ ]Dry mouth  [ ]No external oral lesions [ ] No external ear or nose lesions  CARDIOVASCULAR:  [ ]Regular [ ]Irregular [ ]Tachy [ ]Not Tachy  [ ]Randell [ ] Edema [ ] No edema  PULMONARY:  [ ]Tachypnea  [ ]Audible excessive secretions [ ] No labored breathing [ ] labored breathing  GASTROINTESTINAL: [ ]Soft  [ ]Distended  [ ]Not distended [ ]Non tender [ ]Tender  MUSCULOSKELETAL: [ ]No clubbing [ ] clubbing  [ ] No cyanosis [ ] cyanosis  NEUROLOGIC: [ ]No focal deficits  [ ]Follows commands  [ ]Does not follow commands  [ ]Cognitive impairment  [ ]Dysphagia  [ ]Dysarthria  [ ]Paresis   SKIN: [ ] Jaundiced [ ] Non-jaundiced [ ]Rash [ ]No Rash [ ] Warm [ ] Dry  MISC/LINES: [ ] ET tube [ ] Trach [ ]NGT/OGT [ ]PEG [ ]Davies    CRITICAL CARE:  [ ] Shock Present  [ ]Septic [ ]Cardiogenic [ ]Neurologic [ ]Hypovolemic  [ ]  Vasopressors [ ]  Inotropes   [ ]Respiratory failure present [ ]Mechanical ventilation [ ]Non-invasive ventilatory support [ ]High flow  [ ]Acute  [ ]Chronic [ ]Hypoxic  [ ]Hypercarbic [ ]Other  [ ]Other organ failure     LABS: reviewed by me                        13.0   9.62  )-----------( 153      ( 23 Jan 2023 06:50 )             40.3   01-23    148<H>  |  113<H>  |  13  ----------------------------<  120<H>  3.0<L>   |  28  |  0.7    Ca    8.1<L>      23 Jan 2023 06:50  Mg     2.0     01-23    TPro  5.9<L>  /  Alb  3.7  /  TBili  0.6  /  DBili  x   /  AST  24  /  ALT  24  /  AlkPhos  51  01-21        RADIOLOGY & ADDITIONAL STUDIES: reviewed by me    EKG: reviewed by me        REFERRALS:   [ ]Chaplaincy  [ ]Hospice  [ ]Child Life  [ ]Social Work  [ ]Case management [ ]Holistic Therapy     Goals of Care Document:    HPI: 82F with PMH of advanced Parkinson's disease with dementia, hypothyroidism, seizures, HLD here with AMS and fever, from sepsis, c/b electrolyte abnormalities. Unclear etiology, possibly aspiraiton, covered broadly with abx. Patient overall with decline per daughter, and she is opting for focus on comfort, but continuing IV abx and IV fluids for now. Open to hospice, referral to be made. DNR/DNI. No plans for NGT placement. Palliative care c/s for GOC.    PERTINENT PM/SXH:   Parkinsonian syndrome    Hypothyroid    Parkinson disease    Back pain    Dyslipidemia    Dementia      No significant past surgical history      FAMILY HISTORY: cannot obtain from patient    ITEMS NOT CHECKED ARE NOT PRESENT    SOCIAL HISTORY:   Significant other/partner[ ]  Children[x ]  Hoahaoism/Spirituality:  Substance hx:  [ ]   Tobacco hx:  [ ]   Alcohol hx: [ ]   Living Situation: [ x]Home  [ ]Long term care  [ ]Rehab [ ]Other  Home Services: [ ] HHA [ ] Visting RN [ ] Hospice  Occupation:  Home Opioid hx:  [ ] Y [ x] N [ x] I-Stop Reference No:  This report was requested by: Fern Delgado | Reference #: [id number]    You have not added a RUTH number. Keeping your RUTH number(s) up to date on the My RUTH # page will enable the separation of your prescriptions from others in the search results.    Others' Prescriptions  Patient Name: Janine Zhong Date: 1940  Address: [address] [place] [id number]: Female  Rx Written	Rx Dispensed	Drug	Quantity	Days Supply	Prescriber Name	Prescriber Ruth #	Payment Method	Dispenser  05/13/2022	05/14/2022	tramadol hcl 50 mg tablet	30	30	Andrea Rollins S,	[id number]	[facility] #[id number]  * - Drugs marked with an asterisk are compound drugs. If the compound drug is made up of more than one controlled substance, then each controlled substance will be a separate row in the  ADVANCE DIRECTIVES:    [ ] Full Code [x ] DNR/DNI  MOLST  [ ]  Living Will  [ ]   DECISION MAKER(s):  [x ] Health Care Proxy(s)  [ ] Surrogate(s)  [ ] Guardian           Name(s): Phone Number(s):  Answers: Emergency Contact Name Maritza,  Answers: Emergency Contact Phone # [phone],  Answers: Emergency Contact Relationship daughter  BASELINE (I)ADL(s) (prior to admission):  [place]: [ ]Total  [ ] Moderate [ ]Dependent  Palliative Performance Status Version 2:         %    http://[facility].org/files/news/palliative_performance_scale_ppsv2.pdf    Allergies    No Known Allergies    Intolerances    MEDICATIONS  (STANDING):  acyclovir IVPB 520 milliGRAM(s) IV Intermittent every 8 hours  ampicillin  IVPB      ampicillin  IVPB 2 Gram(s) IV Intermittent every 6 hours  carbidopa/levodopa  25/100 1 Tablet(s) Oral four times a day  carbidopa/levodopa CR 50/200 1 Tablet(s) Oral <User Schedule>  cefTRIAXone   IVPB 2000 milliGRAM(s) IV Intermittent every 12 hours  chlorhexidine 2% Cloths 1 Application(s) Topical daily  dextrose 5% + sodium chloride 0.45%. 1000 milliLiter(s) (75 mL/Hr) IV Continuous <Continuous>  escitalopram 20 milliGRAM(s) Oral daily  heparin   Injectable 5000 Unit(s) SubCutaneous every 8 hours  levothyroxine Injectable 75 MICROGram(s) IV Push at bedtime  simvastatin 20 milliGRAM(s) Oral at bedtime  valproate sodium  IVPB 250 milliGRAM(s) IV Intermittent every 12 hours  vancomycin  IVPB 1000 milliGRAM(s) IV Intermittent every 12 hours    MEDICATIONS  (PRN):  acetaminophen  Suppository .. 650 milliGRAM(s) Rectal every 8 hours PRN Temp greater or equal to 38C (100.4F)    PRESENT SYMPTOMS: [X ]Unable to obtain due to poor mentation   Source if other than patient:  [ ]Family   [ ]Team     Pain: [ ]yes [ ]no  QOL impact -   Location -                    Aggravating factors -  Quality -  Radiation -  Timing-  Severity (0-10 scale):  Minimal acceptable level (0-10 scale):     CPOT:    https://www.[facility].org/getattachment/trz39e78-2u9o-2b6z-9e9z-0271h8935p6d/Critical-Care-Pain-Observation-Tool-(CPOT)      PAIN AD Score:     http://geriatrictoolkit.[facility]/cog/painad.pdf (press ctrl +  left click to view)      Dyspnea:                           [ ]Mild [ ]Moderate [ ]Severe [ ]None  Anxiety:                             [ ]Mild [ ]Moderate [ ]Severe [ ]None  Fatigue:                             [ ]Mild [ ]Moderate [ ]Severe [ ]None  Nausea:                             [ ]Mild [ ]Moderate [ ]Severe [ ]None  Loss of appetite:              [ ]Mild [ ]Moderate [ ]Severe [ ]None  Constipation:                    [ ]Mild [ ]Moderate [ ]Severe [ ]None    Other Symptoms:  [ ]All other review of systems negative     Palliative Performance Status Version 2:         %    http://[facility].org/files/news/palliative_performance_scale_ppsv2.pdf  PHYSICAL EXAM:  Vital Signs Last 24 Hrs  T(C): 36.2 (23 Jan 2023 12:00), Max: 37.7 (22 Jan 2023 16:13)  T(F): 97.1 (23 Jan 2023 12:00), Max: 99.8 (22 Jan 2023 16:13)  HR: 81 (23 Jan 2023 12:00) (77 - 85)  BP: 174/74 (23 Jan 2023 04:49) (157/77 - 174/74)  BP(mean): --  RR: 18 (23 Jan 2023 12:00) (18 - 18)  SpO2: 97% (23 Jan 2023 12:00) (95% - 97%)    Parameters below as of 22 Jan 2023 16:13  Patient On (Oxygen Delivery Method): room air     I&O's Summary    22 Jan 2023 07:01  -  23 Jan 2023 07:00  --------------------------------------------------------  IN: 0 mL / OUT: 600 mL / NET: -600 mL        GENERAL:  [ X] No acute distress [ ]Lethargic  [ ]Unarousable  [ ]Verbal  [ ]Non-Verbal [ ]Cachexia    BEHAVIORAL/PSYCH:  [ ]Alert and Oriented x  [ ] Anxiety [ ] Delirium [ ] Agitation [X ] Calm   EYES: [ ] No scleral icterus [ ] Scleral icterus [ X] Closed  ENMT:  [ ]Dry mouth  [ ]No external oral lesions [X] No external ear or nose lesions  CARDIOVASCULAR:  [ ]Regular [ ]Irregular [ ]Tachy [ ]Not Tachy  [ ]Randell [ ] Edema [ ] No edema  PULMONARY:  [ ]Tachypnea  [ ]Audible excessive secretions [ X] No labored breathing [ ] labored breathing  GASTROINTESTINAL: [ ]Soft  [ ]Distended  [X ]Not distended [ ]Non tender [ ]Tender  MUSCULOSKELETAL: [ ]No clubbing [ ] clubbing  [ X] No cyanosis [ ] cyanosis  NEUROLOGIC: [ ]No focal deficits  [ ]Follows commands  [ ]Does not follow commands  [X ]Cognitive impairment  [ ]Dysphagia  [ ]Dysarthria  [ ]Paresis   SKIN: [ ] Jaundiced [ ] Non-jaundiced [ ]Rash [ ]No Rash [ ] Warm [ ] Dry  MISC/LINES: [ ] ET tube [ ] Trach [ ]NGT/OGT [ ]PEG [ ]Davies    CRITICAL CARE:  [ ] Shock Present  [ ]Septic [ ]Cardiogenic [ ]Neurologic [ ]Hypovolemic  [ ]  Vasopressors [ ]  Inotropes   [ ]Respiratory failure present [ ]Mechanical ventilation [ ]Non-invasive ventilatory support [ ]High flow  [ ]Acute  [ ]Chronic [ ]Hypoxic  [ ]Hypercarbic [ ]Other  [ ]Other organ failure     LABS: reviewed by me                        13.0   9.62  )-----------( 153      ( 23 Jan 2023 06:50 )             40.3   01-23    148<H>  |  113<H>  |  13  ----------------------------<  120<H>  3.0<L>   |  28  |  0.7    Ca    8.1<L>      23 Jan 2023 06:50  Mg     2.0     01-23    TPro  5.9<L>  /  Alb  3.7  /  TBili  0.6  /  DBili  x   /  AST  24  /  ALT  24  /  AlkPhos  51  01-21        RADIOLOGY & ADDITIONAL STUDIES: reviewed by me    EKG: reviewed by me        REFERRALS:   [ ]Chaplaincy  [ ]Hospice  [ ]Child Life  [ ]Social Work  [ ]Case management [ ]Holistic Therapy     Goals of Care Document:

## 2023-01-23 NOTE — CONSULT NOTE ADULT - NS ATTEND AMEND GEN_ALL_CORE FT
as above
82F with PMH of advanced Parkinson's disease with dementia, hypothyroidism, seizures, HLD here with AMS and fever, from sepsis, c/b electrolyte abnormalities. Unclear etiology, possibly aspiraiton, covered broadly with abx. Patient overall with decline per daughter, and she is opting for focus on comfort, but continuing IV abx and IV fluids for now. Open to hospice, referral to be made. DNR/DNI. No plans for NGT placement. d/w daughter.   ______________  Francisco Dill MD  Palliative Medicine  [facility]   of Geriatric and Palliative Medicine  [phone]

## 2023-01-23 NOTE — CONSULT NOTE ADULT - PROBLEM SELECTOR RECOMMENDATION 6
vancomycin  IVPB 1000 milliGRAM(s) IV Intermittent every 12 hours  cefTRIAXone   IVPB 2000 milliGRAM(s) IV Intermittent every 12 hours  ampicillin  IVPB 2 Gram(s) IV Intermittent every 6 hours  See ID consult and primary medical team following - patient is DNR/DNI  - limited medical measures - no further blood draws (may need vanco trough however)

## 2023-01-23 NOTE — PROGRESS NOTE ADULT - ASSESSMENT
82F NHR w/ PMH: Advanced Parkinson Disease, Parkinson's Dementia, Hypothyroidism, recent diagnosis of Seizure Disorder and dyslipidemia p/w AMS and Fever to the ER.       # Sepsis POA (fever, leukocytosis, tachycardia on admission)   # Metabolic Encephalopathy (POA)   # Advanced Parkinson and Advanced Dementia   # Severe Protein Calorie Malnutrition (Temporal Wasting / cachexia) likely FTT   # Hypernatremia due to dehydration / fevers / sepsis  # Hypokalemia - c/w correction f/u mg level     Patient had similar presentation on her presentation in beginning of Jan 2023   NH documentation of fever, as well as in the ED of 38.1  Febrile, tachycardic, and leukocytosis present on admission --> meets 3 criteria of sepsis  No focal symptoms for an infectious etiology, CXR did not show any infiltrates or consolidation  Aspiration pneumonia might be appear radiologically later, especially in the context of dehydration  Repeat CXR if warranted later during the hospital stay  UA is not significant  There was concern of meningitis on prior admission, and HCP refused LP to be done  Will cover empirically for meninigitis with ceftriaxone, vancomycin, ampicillin and acyclovir, despite being recently hospitalized and residing in a nursing home  Monitor renal function while on these antimicrobials  ID consult is in, for necessary de-escalation in therapy, or antibiotics substitution  Ordered a Duplex of lower extremities, as a DVT might cause low grade fevers  F/u blood cultures, temperature curve and leucocytosis on daily CBC  Patient is NPO - Daughter and Son refusing NGT   Pt would benefit from Hospice Care if HCP is interested     Possibly in the context of dehydration and decreased PO intake  c/w D5 + 0.45% NaCl at a rate of 75 cc/h  Repeat BMP BID, do not aim for a correction above 6-8 mEq/mL     # Seizure disorder  - f/u Neuro recs   Sharp waves on vEEG on latest admission  Valproic acid dosage was decreased while in the NH  Levels of 6 in the ED, which are sub-therapeutic  Will put patient back on depakote 250 BID    # Hypothyroidism  C/w  levothyroxine  Recent TSH of 1.7, no need for repeat    # Parkinson disease  C/w Sinemet 4 times daily, and Sinemet ER at bedtime    DVT ppx: heparin subq  GI ppx: not indicated  Diet: NPO for now f/u SLP eval   Activity: as tolerated    DNR/ DNI as per MOLST form  POOR PROGNOSIS     Sonya Marcial MD  Attending Hospitalist

## 2023-01-23 NOTE — CONSULT NOTE ADULT - ASSESSMENT
83yFemale being evaluated for goals of care and symptom management. Pt has no pain or dyspnea. GOC reviewed with daughter       MEDD (morphine equivalent daily dose):      See Recs below.    Please call [phone] with questions or concerns 24/7.   We will continue to follow.    82F with PMH of advanced Parkinson's disease with dementia, hypothyroidism, seizures, HLD here with AMS and fever, from sepsis, c/b electrolyte abnormalities. Unclear etiology, possibly aspiraiton, covered broadly with abx. Patient overall with decline per daughter, and she is opting for focus on comfort, but continuing IV abx and IV fluids for now. Open to hospice, referral to be made. DNR/DNI. No plans for NGT placement. Palliative care c/s for [facility].

## 2023-01-23 NOTE — CONSULT NOTE ADULT - PROBLEM SELECTOR RECOMMENDATION 9
DNR/DNI  -  - hospice referral vancomycin  IVPB 1000 milliGRAM(s) IV Intermittent every 12 hours  cefTRIAXone   IVPB 2000 milliGRAM(s) IV Intermittent every 12 hours  ampicillin  IVPB 2 Gram(s) IV Intermittent every 6 hours  See ID consult and primary medical team following  high risk, monitor counts, monitor vanco levels (no other blood draws)

## 2023-01-23 NOTE — SWALLOW BEDSIDE ASSESSMENT ADULT - SLP PERTINENT HISTORY OF CURRENT PROBLEM
Patient is an 83 y/o man with PMH of advanced Parkinson Disease, dementia, hypothyroidism and dyslipidemia, presents to the ED for altered mental status. She presents to the hospital due to persistent altered mental status, lack of cooperation with the staff, and spiking fever to th 38.

## 2023-01-23 NOTE — PROGRESS NOTE ADULT - ATTENDING COMMENTS
I have personally seen and examined this patient.  I have fully participated in the care of this patient.  I have reviewed all pertinent clinical information, including history, physical exam, plan and note. 81 y/o man with PMH of advanced Parkinson Disease, dementia, hypothyroidism and dyslipidemia, presents to the ED for altered mental status and fever. On exam is lethargic with paratonia and extremities rigidity. Differentials include TME vs CNS encephalitis. Antibiotic per ID. VEEG to r/o subclinical seizure. Continue Depakote. CSF study to r/o infectious and inflammatory process. Resume Sinemet via NGT. Stop Seroquel. Will follow   I have reviewed all pertinent clinical information and reviewed all relevant imaging and diagnostic studies personally.  Recommendations as above.  Agree with above assessment except as noted.

## 2023-01-24 LAB
ANION GAP SERPL CALC-SCNC: 12 MMOL/L — SIGNIFICANT CHANGE UP (ref 7–14)
BUN SERPL-MCNC: 10 MG/DL — SIGNIFICANT CHANGE UP (ref 10–20)
CALCIUM SERPL-MCNC: 8.2 MG/DL — LOW (ref 8.4–10.5)
CHLORIDE SERPL-SCNC: 108 MMOL/L — SIGNIFICANT CHANGE UP (ref 98–110)
CO2 SERPL-SCNC: 24 MMOL/L — SIGNIFICANT CHANGE UP (ref 17–32)
CREAT SERPL-MCNC: 0.7 MG/DL — SIGNIFICANT CHANGE UP (ref 0.7–1.5)
EGFR: 85 ML/MIN/1.73M2 — SIGNIFICANT CHANGE UP
GLUCOSE SERPL-MCNC: 109 MG/DL — HIGH (ref 70–99)
HCT VFR BLD CALC: 37.2 % — SIGNIFICANT CHANGE UP (ref 37–47)
HGB BLD-MCNC: 12.3 G/DL — SIGNIFICANT CHANGE UP (ref 12–16)
MAGNESIUM SERPL-MCNC: 2 MG/DL — SIGNIFICANT CHANGE UP (ref 1.8–2.4)
MCHC RBC-ENTMCNC: 31.4 PG — HIGH (ref 27–31)
MCHC RBC-ENTMCNC: 33.1 G/DL — SIGNIFICANT CHANGE UP (ref 32–37)
MCV RBC AUTO: 94.9 FL — SIGNIFICANT CHANGE UP (ref 81–99)
NRBC # BLD: 0 /100 WBCS — SIGNIFICANT CHANGE UP (ref 0–0)
PLATELET # BLD AUTO: 147 K/UL — SIGNIFICANT CHANGE UP (ref 130–400)
POTASSIUM SERPL-MCNC: 3.3 MMOL/L — LOW (ref 3.5–5)
POTASSIUM SERPL-SCNC: 3.3 MMOL/L — LOW (ref 3.5–5)
RBC # BLD: 3.92 M/UL — LOW (ref 4.2–5.4)
RBC # FLD: 12.4 % — SIGNIFICANT CHANGE UP (ref 11.5–14.5)
SODIUM SERPL-SCNC: 144 MMOL/L — SIGNIFICANT CHANGE UP (ref 135–146)
WBC # BLD: 8.04 K/UL — SIGNIFICANT CHANGE UP (ref 4.8–10.8)
WBC # FLD AUTO: 8.04 K/UL — SIGNIFICANT CHANGE UP (ref 4.8–10.8)

## 2023-01-24 PROCEDURE — 99233 SBSQ HOSP IP/OBS HIGH 50: CPT

## 2023-01-24 PROCEDURE — 99232 SBSQ HOSP IP/OBS MODERATE 35: CPT

## 2023-01-24 RX ORDER — POTASSIUM CHLORIDE 20 MEQ
20 PACKET (EA) ORAL
Refills: 0 | Status: COMPLETED | OUTPATIENT
Start: 2023-01-24 | End: 2023-01-24

## 2023-01-24 RX ORDER — METOCLOPRAMIDE HCL 10 MG
5 TABLET ORAL EVERY 8 HOURS
Refills: 0 | Status: DISCONTINUED | OUTPATIENT
Start: 2023-01-24 | End: 2023-01-26

## 2023-01-24 RX ORDER — ROBINUL 0.2 MG/ML
0.4 INJECTION INTRAMUSCULAR; INTRAVENOUS
Refills: 0 | Status: DISCONTINUED | OUTPATIENT
Start: 2023-01-24 | End: 2023-01-26

## 2023-01-24 RX ORDER — MORPHINE SULFATE 50 MG/1
2 CAPSULE, EXTENDED RELEASE ORAL
Refills: 0 | Status: DISCONTINUED | OUTPATIENT
Start: 2023-01-24 | End: 2023-01-26

## 2023-01-24 RX ADMIN — CEFTRIAXONE 100 MILLIGRAM(S): 500 INJECTION, POWDER, FOR SOLUTION INTRAMUSCULAR; INTRAVENOUS at 05:16

## 2023-01-24 RX ADMIN — Medication 52.5 MILLIGRAM(S): at 05:17

## 2023-01-24 RX ADMIN — Medication 75 MICROGRAM(S): at 22:59

## 2023-01-24 RX ADMIN — Medication 200 GRAM(S): at 05:52

## 2023-01-24 RX ADMIN — Medication 110.4 MILLIGRAM(S): at 03:32

## 2023-01-24 RX ADMIN — Medication 50 MILLIEQUIVALENT(S): at 10:20

## 2023-01-24 RX ADMIN — Medication 250 MILLIGRAM(S): at 06:00

## 2023-01-24 RX ADMIN — HEPARIN SODIUM 5000 UNIT(S): 5000 INJECTION INTRAVENOUS; SUBCUTANEOUS at 05:16

## 2023-01-24 RX ADMIN — Medication 52.5 MILLIGRAM(S): at 17:37

## 2023-01-24 NOTE — GOALS OF CARE CONVERSATION - ADVANCED CARE PLANNING - CONVERSATION DETAILS
Spoke to daughter regarding goal of care for her mother; daughter expressed that she would her mother to be kept comfortable and would eventually like to take her home on hospice. She would however like to continue IVF for now

## 2023-01-24 NOTE — SWALLOW BEDSIDE ASSESSMENT ADULT - SLP PERTINENT HISTORY OF CURRENT PROBLEM
Pt is an 81 y/o M w/ PMHx: advanced Parkinson's Disease, dementia, hypothyroidism and dyslipidemia, presents to the ED for persistent AMS, worsening increased tone to bilateral UE LE and fever. Pt recently admitted to the hospital for similar complaints, for which she was given empiric antibiotics and had a vEEG done showing intermittent sharp waves, and for which the patient was placed on valproic acid. Back then, there was a possibility of meningitis, but the daughter who is the health care proxy refused LP. Depakote decreased in the NH for drowsiness. Sinemet has been held due to NPO status, per note patient's HCP/family do not want NG Tube. Neurological exam notable for diffuse bilateral UE/LE hypertonicity and hyperreflexia consistent with known advanced Parkinson's Disease. Neuro recommending NGT placement and restarting all Parkinson's medications. Differentials include TME vs CNS encephalitis. Neuro also recommending VEEG and CSF study. Palliative following, fam open to hospice

## 2023-01-24 NOTE — HOSPICE CARE NOTE - CONVESATION DETAILS
Consult completed via phone call to patient's daughter Maritza who is a retired ICU nurse. Patient was diagnosed with Parkinson's Disease 15 years ago. Patient has been managed at home by her daughter was recently placed at [facility] for POLO. Patient re hospitalized for AMS and fever. Empirically treated for meningitis, daughter declined  lumbar puncture. Patient presently NPO. Maritza declines peg tube. Hospice plan in place. DME pending delivery Wed . Patient to be D/C Thursday afternoon to [address] [place] [id number]. Hospice to admit to services Friday.

## 2023-01-24 NOTE — PROGRESS NOTE ADULT - ASSESSMENT
82F with PMH of advanced Parkinson's disease with dementia, hypothyroidism, seizures, HLD here with AMS and fever, from sepsis, c/b electrolyte abnormalities. Unclear etiology, possibly aspiraiton, covered broadly with abx. Patient overall with decline per daughter, and she is opting for focus on comfort, but continuing IV abx and IV fluids for now. Open to hospice, referral to be made. DNR/DNI. No plans for NGT placement. Palliative care c/s for [facility].

## 2023-01-25 VITALS — WEIGHT: 119.93 LBS

## 2023-01-25 LAB
ANION GAP SERPL CALC-SCNC: 6 MMOL/L — LOW (ref 7–14)
BUN SERPL-MCNC: 7 MG/DL — LOW (ref 10–20)
CALCIUM SERPL-MCNC: 8.5 MG/DL — SIGNIFICANT CHANGE UP (ref 8.4–10.4)
CHLORIDE SERPL-SCNC: 106 MMOL/L — SIGNIFICANT CHANGE UP (ref 98–110)
CO2 SERPL-SCNC: 29 MMOL/L — SIGNIFICANT CHANGE UP (ref 17–32)
CREAT SERPL-MCNC: 0.6 MG/DL — LOW (ref 0.7–1.5)
CULTURE RESULTS: SIGNIFICANT CHANGE UP
CULTURE RESULTS: SIGNIFICANT CHANGE UP
EGFR: 88 ML/MIN/1.73M2 — SIGNIFICANT CHANGE UP
GLUCOSE SERPL-MCNC: 97 MG/DL — SIGNIFICANT CHANGE UP (ref 70–99)
HCT VFR BLD CALC: 40.1 % — SIGNIFICANT CHANGE UP (ref 37–47)
HGB BLD-MCNC: 13.3 G/DL — SIGNIFICANT CHANGE UP (ref 12–16)
MAGNESIUM SERPL-MCNC: 2 MG/DL — SIGNIFICANT CHANGE UP (ref 1.8–2.4)
MCHC RBC-ENTMCNC: 30.9 PG — SIGNIFICANT CHANGE UP (ref 27–31)
MCHC RBC-ENTMCNC: 33.2 G/DL — SIGNIFICANT CHANGE UP (ref 32–37)
MCV RBC AUTO: 93 FL — SIGNIFICANT CHANGE UP (ref 81–99)
NRBC # BLD: 0 /100 WBCS — SIGNIFICANT CHANGE UP (ref 0–0)
PLATELET # BLD AUTO: 181 K/UL — SIGNIFICANT CHANGE UP (ref 130–400)
POTASSIUM SERPL-MCNC: 3.7 MMOL/L — SIGNIFICANT CHANGE UP (ref 3.5–5)
POTASSIUM SERPL-SCNC: 3.7 MMOL/L — SIGNIFICANT CHANGE UP (ref 3.5–5)
RBC # BLD: 4.31 M/UL — SIGNIFICANT CHANGE UP (ref 4.2–5.4)
RBC # FLD: 12.4 % — SIGNIFICANT CHANGE UP (ref 11.5–14.5)
SODIUM SERPL-SCNC: 141 MMOL/L — SIGNIFICANT CHANGE UP (ref 135–146)
SPECIMEN SOURCE: SIGNIFICANT CHANGE UP
SPECIMEN SOURCE: SIGNIFICANT CHANGE UP
WBC # BLD: 7.01 K/UL — SIGNIFICANT CHANGE UP (ref 4.8–10.8)
WBC # FLD AUTO: 7.01 K/UL — SIGNIFICANT CHANGE UP (ref 4.8–10.8)

## 2023-01-25 PROCEDURE — 99233 SBSQ HOSP IP/OBS HIGH 50: CPT

## 2023-01-25 PROCEDURE — 99232 SBSQ HOSP IP/OBS MODERATE 35: CPT

## 2023-01-25 RX ADMIN — Medication 75 MICROGRAM(S): at 21:30

## 2023-01-25 RX ADMIN — Medication 52.5 MILLIGRAM(S): at 05:46

## 2023-01-25 RX ADMIN — Medication 52.5 MILLIGRAM(S): at 18:47

## 2023-01-25 NOTE — PROVIDER CONTACT NOTE (OTHER) - SITUATION
patient DNRI/DNI, comfort measures. patient's MOLST form is not properly filled out and not signed by an attending.

## 2023-01-25 NOTE — PROGRESS NOTE ADULT - PROVIDER SPECIALTY LIST ADULT
Hospitalist
Neurology
Hospitalist
Hospitalist
Internal Medicine
Hospitalist
Palliative Care
Palliative Care

## 2023-01-25 NOTE — PROGRESS NOTE ADULT - ASSESSMENT
82F NHR w/ PMH: Advanced Parkinson Disease, Parkinson's Dementia, Hypothyroidism, recent diagnosis of Seizure Disorder and dyslipidemia p/w AMS and Fever to the ER.     # Sepsis POA (fever, leukocytosis, tachycardia on admission)   # Metabolic Encephalopathy (POA)   # Advanced Parkinson and Advanced Dementia   # Severe Protein Calorie Malnutrition (Temporal Wasting / cachexia) likely FTT   # Hypernatremia due to dehydration / fevers / sepsis  # Hypokalemia - c/w correction f/u mg level   # Seizure disorder  # Hypothyroidism  # Parkinson disease    Palliative spoke with daughter on 1/24, decision made to make patient CMO and will go home with hospice tomorrow. C/w IV morphine, ativan, robinul.

## 2023-01-25 NOTE — PROGRESS NOTE ADULT - PROBLEM SELECTOR PLAN 6
- planned d/c home with hospice later this week  ______________  Francisco Dill MD  Palliative Medicine  [facility]   of Geriatric and Palliative Medicine  [phone]
- planned d/c home with hospice later this week  - will sign off  - reconsult PRN  ______________  Francisco Dill MD  Palliative Medicine  [facility]   of Geriatric and Palliative Medicine  [phone]

## 2023-01-25 NOTE — PROGRESS NOTE ADULT - PROBLEM SELECTOR PLAN 3
- supportive care  - c/w IVF, otherwise symptom-directed care  - c/w IV morphine, ativan, robinul
- supportive care  - c/w IV morphine, ativan, robinul  - for d/c, can use morphine concentrate (roxanol) 5mg Q4 SL

## 2023-01-25 NOTE — DIETITIAN INITIAL EVALUATION ADULT - PERTINENT LABORATORY DATA
01-25    141  |  106  |  7<L>  ----------------------------<  97  3.7   |  29  |  0.6<L>    Ca    8.5      25 Jan 2023 06:24  Mg     2.0     01-25

## 2023-01-25 NOTE — DIETITIAN INITIAL EVALUATION ADULT - OTHER INFO
Patient is 83 yo female with PMHx of Advanced Parkinson's disease, Parkinson's Dementia, Hypothyroidism, recent diagnosis of Seizure Disorder and dyslipidemia p/w AMS and Fever to the ER    #Sepsis POA   #Metabolic Encephalopathy (POA)  #Advanced Parkinson and Advanced Dementia  #Severe Protein Calorie Malnutrition (Temporal wasting / cachexia) likely FTT  #Hypernatremia due to dehydration / fevers/ sepsis  #Hypokalemia - c/w correction f/u mg level    SLP recommending to continue NPO w/ non-oral means of nutrition/hydration    Patient is Comfort Measures only since 1/24   Per Palliative Note 1/24 - comfort feeds as tolerated

## 2023-01-25 NOTE — PROGRESS NOTE ADULT - SUBJECTIVE AND OBJECTIVE BOX
HPI: 82F with PMH of advanced Parkinson's disease with dementia, hypothyroidism, seizures, HLD here with AMS and fever, from sepsis, c/b electrolyte abnormalities. Unclear etiology, possibly aspiraiton, covered broadly with abx. Patient overall with decline per daughter, and she is opting for focus on comfort, but continuing IV abx and IV fluids for now. Open to hospice, referral to be made. DNR/DNI. No plans for NGT placement. Palliative care c/s for GOC.    INTERVAL EVENTS:  1/24: patient comfortable  1/25: patient comfortable, family at bedside    ADVANCE DIRECTIVES:    [ ] Full Code [x ] DNR/DNI  MOLST  [ ]  Living Will  [ ]   DECISION MAKER(s):  [x ] Health Care Proxy(s)  [ ] Surrogate(s)  [ ] Guardian           Name(s): Phone Number(s):  Answers: Emergency Contact Name Maritza,  Answers: Emergency Contact Phone # [phone],  Answers: Emergency Contact Relationship daughter    BASELINE (I)ADL(s) (prior to admission):  [place]: [ ]Total  [ ] Moderate [ ]Dependent  Palliative Performance Status Version 2:         %    http://npcrc.org/files/news/palliative_performance_scale_ppsv2.pdf    Allergies    No Known Allergies    Intolerances    MEDICATIONS  (STANDING):  levothyroxine Injectable 75 MICROGram(s) IV Push at bedtime  valproate sodium  IVPB 250 milliGRAM(s) IV Intermittent every 12 hours    MEDICATIONS  (PRN):  artificial  tears Solution 2 Drop(s) Both EYES every 1 hour PRN Dry Eyes  glycopyrrolate Injectable 0.4 milliGRAM(s) IV Push four times a day PRN Secretions  LORazepam   Injectable 0.5 milliGRAM(s) IV Push every 4 hours PRN Anxiety  metoclopramide Injectable 5 milliGRAM(s) IV Push every 8 hours PRN Nausea and/or Vomiting  morphine  - Injectable 2 milliGRAM(s) IV Push every 2 hours PRN Moderate pain (4-6), Severe pain (7-10), Respiratory rate greater than 22    PRESENT SYMPTOMS: [X] Unable to obtain due to poor mentation   Source if other than patient:  [ ]Family   [ ]Team     Pain: [ ]yes [ ]no  QOL impact -   Location -                    Aggravating factors -  Quality -  Radiation -  Timing-  Severity (0-10 scale):  Minimal acceptable level (0-10 scale):     CPOT:    https://www.[email or web address]/getattachment/[email or web address]/Critical-Care-Pain-Observation-Tool-(CPOT)      PAIN AD Score:     [email or web address]/painad.pdf (press ctrl +  left click to view)      Dyspnea:                           [ ]Mild [ ]Moderate [ ]Severe [ ]None  Anxiety:                             [ ]Mild [ ]Moderate [ ]Severe [ ]None  Fatigue:                             [ ]Mild [ ]Moderate [ ]Severe [ ]None  Nausea:                             [ ]Mild [ ]Moderate [ ]Severe [ ]None  Loss of appetite:              [ ]Mild [ ]Moderate [ ]Severe [ ]None  Constipation:                    [ ]Mild [ ]Moderate [ ]Severe [ ]None    Other Symptoms:  [ ]All other review of systems negative     Palliative Performance Status Version 2:         %    http://[email or web address].org/files/news/palliative_performance_scale_ppsv2.pdf  PHYSICAL EXAM:  Vital Signs Last 24 Hrs  T(C): --  T(F): --  HR: --  BP: --  BP(mean): --  RR: --  SpO2: --    GENERAL:  [ X] No acute distress [ ]Lethargic  [ ]Unarousable  [ ]Verbal  [ ]Non-Verbal [ ]Cachexia    BEHAVIORAL/PSYCH:  [ ]Alert and Oriented x  [ ] Anxiety [ ] Delirium [ ] Agitation [X ] Calm   EYES: [ ] No scleral icterus [ ] Scleral icterus [ X] Closed  ENMT:  [ ]Dry mouth  [ ]No external oral lesions [X] No external ear or nose lesions  CARDIOVASCULAR:  [ ]Regular [ ]Irregular [ ]Tachy [ ]Not Tachy  [ ]Randell [ ] Edema [ ] No edema  PULMONARY:  [ ]Tachypnea  [ ]Audible excessive secretions [ X] No labored breathing [ ] labored breathing  GASTROINTESTINAL: [ ]Soft  [ ]Distended  [X ]Not distended [ ]Non tender [ ]Tender  MUSCULOSKELETAL: [ ]No clubbing [ ] clubbing  [ X] No cyanosis [ ] cyanosis  NEUROLOGIC: [ ]No focal deficits  [ ]Follows commands  [ ]Does not follow commands  [X ]Cognitive impairment  [ ]Dysphagia  [ ]Dysarthria  [ ]Paresis   SKIN: [ ] Jaundiced [ ] Non-jaundiced [ ]Rash [ ]No Rash [ ] Warm [ ] Dry  MISC/LINES: [ ] ET tube [ ] Trach [ ]NGT/OGT [ ]PEG [ ]Davies    CRITICAL CARE:  [ ] Shock Present  [ ]Septic [ ]Cardiogenic [ ]Neurologic [ ]Hypovolemic  [ ]  Vasopressors [ ]  Inotropes   [ ]Respiratory failure present [ ]Mechanical ventilation [ ]Non-invasive ventilatory support [ ]High flow  [ ]Acute  [ ]Chronic [ ]Hypoxic  [ ]Hypercarbic [ ]Other  [ ]Other organ failure     LABS: reviewed by me                                   13.3   7.01  )-----------( 181      ( 25 Jan 2023 06:24 )             40.1     01-25    141  |  106  |  7<L>  ----------------------------<  97  3.7   |  29  |  0.6<L>    Ca    8.5      25 Jan 2023 06:24  Mg     2.0     01-25        RADIOLOGY & ADDITIONAL STUDIES: reviewed by me    EKG: reviewed by me        REFERRALS:   [ ]Chaplaincy  [ ]Hospice  [ ]Child Life  [ ]Social Work  [ ]Case management [ ]Holistic Therapy     Goals of Care Document:   
Neurology Progress Note  RODGER REYNA  MRN-[id number]  [id number]    Vital Signs Last 24 Hrs  T(C): 37 (23 Jan 2023 04:49), Max: 37.7 (22 Jan 2023 16:13)  T(F): 98.6 (23 Jan 2023 04:49), Max: 99.8 (22 Jan 2023 16:13)  HR: 85 (23 Jan 2023 04:49) (77 - 85)  BP: 174/74 (23 Jan 2023 04:49) (157/77 - 174/74)  BP(mean): --  RR: 18 (23 Jan 2023 04:49) (18 - 18)  SpO2: 96% (23 Jan 2023 04:49) (95% - 96%)    Parameters below as of 22 Jan 2023 16:13  Patient On (Oxygen Delivery Method): room air        VIDEO/EEG MONITORING LAST 24HRS:     BACKGROUND:    INTERICTAL ACTIVITY:    SEIZURES:    EVENTS:    AED LEVELS:    PLAN:    Neurological Exam:   Mental status: Lethargic, non-verbal, moaning intermittently. Not following commands.   Cranial nerves: Pupils small  Motor: Diffusely hypertonic, UE flexed    Sensation: unable to assess, patient not following commands  Coordination: unable to assess, patient not following commands  Reflexes: 4+ in bilateral UE/LE  Gait: deferred    Medications:    MEDICATIONS  (STANDING):  acyclovir IVPB 520 milliGRAM(s) IV Intermittent every 8 hours  ampicillin  IVPB      ampicillin  IVPB 2 Gram(s) IV Intermittent every 6 hours  carbidopa/levodopa  25/100 1 Tablet(s) Oral four times a day  carbidopa/levodopa CR 50/200 1 Tablet(s) Oral <User Schedule>  cefTRIAXone   IVPB 2000 milliGRAM(s) IV Intermittent every 12 hours  chlorhexidine 2% Cloths 1 Application(s) Topical daily  dextrose 5% + sodium chloride 0.45%. 1000 milliLiter(s) (75 mL/Hr) IV Continuous <Continuous>  escitalopram 20 milliGRAM(s) Oral daily  heparin   Injectable 5000 Unit(s) SubCutaneous every 8 hours  levothyroxine Injectable 75 MICROGram(s) IV Push at bedtime  potassium chloride  20 mEq/100 mL IVPB 20 milliEquivalent(s) IV Intermittent every 2 hours  QUEtiapine 25 milliGRAM(s) Oral at bedtime  simvastatin 20 milliGRAM(s) Oral at bedtime  valproate sodium  IVPB 250 milliGRAM(s) IV Intermittent every 12 hours  vancomycin  IVPB 1000 milliGRAM(s) IV Intermittent every 12 hours    MEDICATIONS  (PRN):  acetaminophen  Suppository .. 650 milliGRAM(s) Rectal every 8 hours PRN Temp greater or equal to 38C (100.4F)        Labs:  01-23    148<H>  |  113<H>  |  13  ----------------------------<  120<H>  3.0<L>   |  28  |  0.7    Ca    8.1<L>      23 Jan 2023 06:50  Mg     2.0     01-23    TPro  5.9<L>  /  Alb  3.7  /  TBili  0.6  /  DBili  x   /  AST  24  /  ALT  24  /  AlkPhos  51  01-21    LIVER FUNCTIONS - ( 21 Jan 2023 15:53 )  Alb: 3.7 g/dL / Pro: 5.9 g/dL / ALK PHOS: 51 U/L / ALT: 24 U/L / AST: 24 U/L / GGT: x                       
  RODGER REYNA  [facility] [place] [address] [facility] ([facility] [place]      Patient is a 83y old  Female who presents with a chief complaint of FEVER;AMS;HYPERNATREMIA     (25 Jan 2023 09:42)        Interval events:  Patient seen and examined at bedside. No acute events overnight.     -PMHx: Parkinsonian syndrome    Hypothyroid    Parkinson disease    Back pain    Dyslipidemia    Dementia      -PSHx:No significant past surgical history            REVIEW OF SYSTEMS:  Unable to assess due to patient's mental status.     T(C): --  HR: --  BP: --  RR: --  SpO2: --  CAPILLARY BLOOD GLUCOSE          PHYSICAL EXAM:  GENERAL: NAD, lying in bed comfortably  HEAD:  Atraumatic, Normocephalic  EYES: EOMI, PERRLA, conjunctiva and sclera clear  ENT: dry mucous membranes  NECK: Supple, No JVD  CHEST/LUNG: Clear to auscultation bilaterally; No rales, rhonchi, wheezing, or rubs. Unlabored respirations  HEART: Regular rate and rhythm; No murmurs, rubs, or gallops  ABDOMEN: Bowel sounds present; Soft, Nontender, Nondistended. No hepatomegaly  EXTREMITIES:  2+ Peripheral Pulses, brisk capillary refill. No clubbing, cyanosis, or edema  NERVOUS SYSTEM:  Alert & Oriented X0, speech unintelligible   MSK: FROM all 4 extremities, full and equal strength  SKIN: No rashes or lesions    Spoke with consultants: Y[ ] N [ ]     LABS:          13.3  7.01  )-------(181          40.1  N=--  L=--  MCV=93.0    141|106|7<L>  ------------------<97  3.7|29|0.6<L>  eGFR:--  Ca:8.5            Microbiology:    Culture - Urine (collected 01-20-23 @ 17:56)  Source: Clean Catch Clean Catch (Midstream)  Final Report (01-23-23 @ 13:49):    10,000 - 49,000 CFU/mL Escherichia coli    <10,000 CFU/ml Normal Urogenital adam present  Organism: Escherichia coli (01-23-23 @ 13:49)  Organism: Escherichia coli (01-23-23 @ 13:49)      -  Amikacin: S <=16      -  Amoxicillin/Clavulanic Acid: I 16/8      -  Ampicillin: R >16 These ampicillin results predict results for amoxicillin      -  Ampicillin/Sulbactam: I 16/8 Enterobacter, Klebsiella aerogenes, Citrobacter, and Serratia may develop resistance during prolonged therapy (3-4 days)      -  Aztreonam: S <=4      -  Cefazolin: S <=2 For uncomplicated UTI with K. pneumoniae, E. coli, or P. mirablis: EULALIO <=16 is sensitive and EULALIO >=32 is resistant. This also predicts results for oral agents cefaclor, cefdinir, cefpodoxime, cefprozil, cefuroxime axetil, cephalexin and locarbef for uncomplicated UTI. Note that some isolates may be susceptible to these agents while testing resistant to cefazolin.      -  Cefepime: S <=2      -  Cefoxitin: S <=8      -  Ceftriaxone: S <=1 Enterobacter, Klebsiella aerogenes, Citrobacter, and Serratia may develop resistance during prolonged therapy      -  Cefuroxime: S <=4      -  Ciprofloxacin: S <=0.25      -  Ertapenem: S <=0.5      -  Gentamicin: S <=2      -  Imipenem: S <=1      -  Levofloxacin: S <=0.5      -  Meropenem: S <=1      -  Nitrofurantoin: S <=32 Should not be used to treat pyelonephritis      -  Piperacillin/Tazobactam: S <=8      -  Tobramycin: S <=2      -  Trimethoprim/Sulfamethoxazole: S <=0.5/9.5      Method Type: EULALIO    Culture - Blood (collected 01-20-23 @ 16:12)  Source: .Blood Blood-Peripheral  Preliminary Report (01-21-23 @ 23:01):    No growth to date.    Culture - Blood (collected 01-20-23 @ 16:12)  Source: .Blood Blood-Peripheral  Preliminary Report (01-21-23 @ 23:01):    No growth to date.        RADIOLOGY & ADDITIONAL TESTS:        Medications:  artificial  tears Solution 2 Drop(s) Both EYES every 1 hour PRN  glycopyrrolate Injectable 0.4 milliGRAM(s) IV Push four times a day PRN  levothyroxine Injectable 75 MICROGram(s) IV Push at bedtime  LORazepam   Injectable 0.5 milliGRAM(s) IV Push every 4 hours PRN  metoclopramide Injectable 5 milliGRAM(s) IV Push every 8 hours PRN  morphine  - Injectable 2 milliGRAM(s) IV Push every 2 hours PRN  valproate sodium  IVPB 250 milliGRAM(s) IV Intermittent every 12 hours      
RODGER REYNA  83y  Female    Patient is a 83y old  Female who presents with a chief complaint of Fever (2023 11:07)      INTERVAL HPI/OVERNIGHT EVENTS:    PAST MEDICAL & SURGICAL HISTORY:  Hypothyroid      Parkinson disease      Dyslipidemia      Dementia      No significant past surgical history          T(F): 97.6 (23 @ 12:20), Max: 98.7 (23 @ 04:41)  HR: 77 (23 @ 12:20) (77 - 81)  BP: 126/75 (23 @ 12:20) (117/83 - 169/76)  RR: 18 (23 @ 12:20) (18 - 18)  SpO2: 97% (23 @ 12:20) (96% - 100%)    PHYSICAL EXAM:  GENERAL: cachexia appearing female with temporal wasting / son at bedside doing oral care   HEAD:  Atraumatic, Normocephalic  EYES: EOMI, PERRLA, conjunctiva and sclera clear  NECK: Supple, No JVD  CHEST/LUNG: Clear to auscultation bilaterally; No wheeze  HEART: Regular rate and rhythm; No murmurs, rubs, or gallops  ABDOMEN: Soft, Nontender, Nondistended; Bowel sounds present  EXTREMITIES:  2+ Peripheral Pulses, No clubbing, cyanosis, or edema  PSYCH: Awake does not follow commands / not alert / does not speak   NEUROLOGY: non-focal  SKIN: No rashes or lesions    LABS:                        13.2   10.10 )-----------( 146      ( 2023 04:30 )             42.0           149<H>  |  114<H>  |  18  ----------------------------<  113<H>  3.3<L>   |  27  |  0.7    Ca    8.0<L>      2023 04:30  Mg     2.2         TPro  5.9<L>  /  Alb  3.7  /  TBili  0.6  /  DBili  x   /  AST  24  /  ALT  24  /  AlkPhos  51  01-21    PT/INR - ( 2023 16:12 )   PT: 13.00 sec;   INR: 1.14 ratio       PTT - ( 2023 16:12 )  PTT:25.6 sec    Urinalysis Basic - ( 2023 17:56 )    Color: Yellow / Appearance: Slightly Turbid / S.021 / pH: x  Gluc: x / Ketone: Negative  / Bili: Negative / Urobili: <2 mg/dL   Blood: x / Protein: 30 mg/dL / Nitrite: Negative   Leuk Esterase: Negative / RBC: 8 /HPF / WBC 1 /HPF   Sq Epi: x / Non Sq Epi: 7 /HPF / Bacteria: Negative    MICROBIOLOGY: none    RADIOLOGY & ADDITIONAL TESTS: reviewed     Home Medications:  carbidopa-levodopa 25 mg-100 mg oral tablet: 1 tab(s) orally 4 times a day (2023 15:30)  carbidopa-levodopa 50 mg-200 mg oral tablet, extended release: 1 tab(s) orally once a day (at bedtime) (2023 15:30)  divalproex sodium 250 mg oral delayed release tablet: 1 tab(s) orally 2 times a day (2023 15:30)  escitalopram 20 mg oral tablet: 1 tab(s) orally once a day (in the morning) (2023 15:30)  SEROquel 25 mg oral tablet: 0.5 tab(s) orally once a day (at bedtime) (2023 15:30)  simvastatin 20 mg oral tablet: 1 tab(s) orally once a day (at bedtime) (2023 15:30)  Synthroid 88 mcg (0.088 mg) oral tablet: 1 tab(s) orally once a day (2023 15:30)    MEDICATIONS  (STANDING):  acyclovir IVPB 520 milliGRAM(s) IV Intermittent every 8 hours    ampicillin  IVPB 2 Gram(s) IV Intermittent every 6 hours  carbidopa/levodopa  25/100 1 Tablet(s) Oral four times a day  carbidopa/levodopa CR 50/200 1 Tablet(s) Oral <User Schedule>  cefTRIAXone   IVPB 2000 milliGRAM(s) IV Intermittent every 12 hours  chlorhexidine 2% Cloths 1 Application(s) Topical daily  dextrose 5% + sodium chloride 0.45%. 1000 milliLiter(s) (75 mL/Hr) IV Continuous <Continuous>  escitalopram 20 milliGRAM(s) Oral daily  heparin   Injectable 5000 Unit(s) SubCutaneous every 8 hours  levothyroxine Injectable 75 MICROGram(s) IV Push at bedtime  QUEtiapine 25 milliGRAM(s) Oral at bedtime  simvastatin 20 milliGRAM(s) Oral at bedtime  valproate sodium  IVPB 250 milliGRAM(s) IV Intermittent every 12 hours  vancomycin  IVPB 1000 milliGRAM(s) IV Intermittent every 12 hours    MEDICATIONS  (PRN):  acetaminophen  Suppository .. 650 milliGRAM(s) Rectal every 8 hours PRN Temp greater or equal to 38C (100.4F)            
SUBJECTIVE:    Patient is a 83y old Female who presents with a chief complaint of Fever (23 Jan 2023 12:32)    Currently admitted to medicine with the primary diagnosis of Fever       Today is hospital day 3d. This morning she is resting comfortably in bed and reports no new issues or overnight events.     PAST MEDICAL & SURGICAL HISTORY  Hypothyroid    Parkinson disease    Dyslipidemia    Dementia    No significant past surgical history      SOCIAL HISTORY:  Negative for smoking/alcohol/drug use.     ALLERGIES:  No Known Allergies    MEDICATIONS:  STANDING MEDICATIONS  acyclovir IVPB 520 milliGRAM(s) IV Intermittent every 8 hours  ampicillin  IVPB      ampicillin  IVPB 2 Gram(s) IV Intermittent every 6 hours  carbidopa/levodopa  25/100 1 Tablet(s) Oral four times a day  carbidopa/levodopa CR 50/200 1 Tablet(s) Oral <User Schedule>  cefTRIAXone   IVPB 2000 milliGRAM(s) IV Intermittent every 12 hours  chlorhexidine 2% Cloths 1 Application(s) Topical daily  dextrose 5% + sodium chloride 0.45%. 1000 milliLiter(s) IV Continuous <Continuous>  escitalopram 20 milliGRAM(s) Oral daily  heparin   Injectable 5000 Unit(s) SubCutaneous every 8 hours  levothyroxine Injectable 75 MICROGram(s) IV Push at bedtime  potassium chloride  20 mEq/100 mL IVPB 20 milliEquivalent(s) IV Intermittent every 2 hours  QUEtiapine 25 milliGRAM(s) Oral at bedtime  simvastatin 20 milliGRAM(s) Oral at bedtime  valproate sodium  IVPB 250 milliGRAM(s) IV Intermittent every 12 hours  vancomycin  IVPB 1000 milliGRAM(s) IV Intermittent every 12 hours    PRN MEDICATIONS  acetaminophen  Suppository .. 650 milliGRAM(s) Rectal every 8 hours PRN    VITALS:   T(F): 97.1  HR: 81  BP: 174/74  RR: 18  SpO2: 97%    PHYSICAL EXAM:  GEN: No acute distress  LUNGS: Clear to auscultation bilaterally   HEART: S1/S2 present.    ABD: Soft, non-tender, non-distended. Bowel sounds present      LABS:                        13.0   9.62  )-----------( 153      ( 23 Jan 2023 06:50 )             40.3     01-23    148<H>  |  113<H>  |  13  ----------------------------<  120<H>  3.0<L>   |  28  |  0.7    Ca    8.1<L>      23 Jan 2023 06:50  Mg     2.0     01-23    TPro  5.9<L>  /  Alb  3.7  /  TBili  0.6  /  DBili  x   /  AST  24  /  ALT  24  /  AlkPhos  51  01-21                Culture - Urine (collected 20 Jan 2023 17:56)  Source: Clean Catch Clean Catch (Midstream)  Preliminary Report (22 Jan 2023 15:11):    10,000 - 49,000 CFU/mL Escherichia coli    <10,000 CFU/ml Normal Urogenital adam present  Organism: Escherichia coli (23 Jan 2023 13:49)  Organism: Escherichia coli (23 Jan 2023 13:49)    Culture - Blood (collected 20 Jan 2023 16:12)  Source: .Blood Blood-Peripheral  Preliminary Report (21 Jan 2023 23:01):    No growth to date.    Culture - Blood (collected 20 Jan 2023 16:12)  Source: .Blood Blood-Peripheral  Preliminary Report (21 Jan 2023 23:01):    No growth to date.      CARDIAC MARKERS ( 21 Jan 2023 16:57 )  x     / x     / 140 U/L / x     / x            RADIOLOGY:      
RODGER REYNA 83y Female  MRN#: [id number]   Hospital Day: 5d    SUBJECTIVE  Patient is a 83y old Female who presents with a chief complaint of Fever (25 Jan 2023 16:01)  Currently admitted to medicine with the primary diagnosis of Fever      INTERVAL HPI AND OVERNIGHT EVENTS:  Patient was examined and seen at bedside. This morning she is resting comfortably in bed and reports no issues or overnight events.      OBJECTIVE  PAST MEDICAL & SURGICAL HISTORY  Hypothyroid    Parkinson disease    Dyslipidemia    Dementia    No significant past surgical history      ALLERGIES:  No Known Allergies    MEDICATIONS:  STANDING MEDICATIONS  levothyroxine Injectable 75 MICROGram(s) IV Push at bedtime  valproate sodium  IVPB 250 milliGRAM(s) IV Intermittent every 12 hours    PRN MEDICATIONS  artificial  tears Solution 2 Drop(s) Both EYES every 1 hour PRN  glycopyrrolate Injectable 0.4 milliGRAM(s) IV Push four times a day PRN  LORazepam   Injectable 0.5 milliGRAM(s) IV Push every 4 hours PRN  metoclopramide Injectable 5 milliGRAM(s) IV Push every 8 hours PRN  morphine  - Injectable 2 milliGRAM(s) IV Push every 2 hours PRN      Vitals:  VITAL SIGNS: Last 24 Hours  T(C): --  T(F): --  HR: --  BP: --  BP(mean): --  RR: --  SpO2: --  Orthostatic Vitals:  Orthostatic VS    I's&O's:  I&O's Summary    24 Jan 2023 07:01  -  25 Jan 2023 07:00  --------------------------------------------------------  IN: 1775 mL / OUT: 1000 mL / NET: 775 mL    25 Jan 2023 07:01  -  25 Jan 2023 17:03  --------------------------------------------------------  IN: 0 mL / OUT: 500 mL / NET: -500 mL      PHYSICAL EXAM:  GENERAL: NAD, lying in bed comfortably  HEAD:  Atraumatic, Normocephalic  EYES: EOMI, PERRLA, conjunctiva and sclera clear  ENT: dry mucous membranes  NECK: Supple, No JVD  CHEST/LUNG: Clear to auscultation bilaterally; No rales, rhonchi, wheezing, or rubs. Unlabored respirations  HEART: Regular rate and rhythm; No murmurs, rubs, or gallops  ABDOMEN: Bowel sounds present; Soft, Nontender, Nondistended. No hepatomegaly  EXTREMITIES:  2+ Peripheral Pulses, brisk capillary refill. No clubbing, cyanosis, or edema  NERVOUS SYSTEM:  Alert & Oriented X0, speech unintelligible   MSK: FROM all 4 extremities, full and equal strength  SKIN: No rashes or lesions    CBC:                        13.3   7.01  )-----------( 181      ( 25 Jan 2023 06:24 )             40.1     CMP:  01-25    141  |  106  |  7<L>  ----------------------------<  97  3.7   |  29  |  0.6<L>    Ca    8.5      25 Jan 2023 06:24  Mg     2.0     01-25      POCS:  CAPILLARY BLOOD GLUCOSE                        
SUBJECTIVE:    Patient is a 83y old Female who presents with a chief complaint of Fever (23 Jan 2023 14:22)    Currently admitted to medicine with the primary diagnosis of Fever       Today is hospital day 4d. This morning she is resting comfortably in bed and reports no new issues or overnight events.     PAST MEDICAL & SURGICAL HISTORY  Hypothyroid    Parkinson disease    Dyslipidemia    Dementia    No significant past surgical history      SOCIAL HISTORY:  Negative for smoking/alcohol/drug use.     ALLERGIES:  No Known Allergies    MEDICATIONS:  STANDING MEDICATIONS  dextrose 5% + sodium chloride 0.45%. 1000 milliLiter(s) IV Continuous <Continuous>  levothyroxine Injectable 75 MICROGram(s) IV Push at bedtime  potassium chloride  20 mEq/100 mL IVPB 20 milliEquivalent(s) IV Intermittent every 2 hours  valproate sodium  IVPB 250 milliGRAM(s) IV Intermittent every 12 hours    PRN MEDICATIONS  artificial  tears Solution 2 Drop(s) Both EYES every 1 hour PRN  glycopyrrolate Injectable 0.4 milliGRAM(s) IV Push four times a day PRN  LORazepam   Injectable 0.5 milliGRAM(s) IV Push every 4 hours PRN  metoclopramide Injectable 5 milliGRAM(s) IV Push every 8 hours PRN  morphine  - Injectable 2 milliGRAM(s) IV Push every 2 hours PRN    VITALS:   T(F): 97.9  HR: 70  BP: 132/63  RR: 18  SpO2: 97%    PHYSICAL EXAM:  GEN: No acute distress  LUNGS: Clear to auscultation bilaterally   HEART: S1/S2 present.  .   ABD: Soft, non-tender, non-distended. Bowel sounds present       LABS:                        12.3   8.04  )-----------( 147      ( 24 Jan 2023 06:36 )             37.2     01-24    144  |  108  |  10  ----------------------------<  109<H>  3.3<L>   |  24  |  0.7    Ca    8.2<L>      24 Jan 2023 06:36  Mg     2.0     01-24                        RADIOLOGY:      
HPI: 82F with PMH of advanced Parkinson's disease with dementia, hypothyroidism, seizures, HLD here with AMS and fever, from sepsis, c/b electrolyte abnormalities. Unclear etiology, possibly aspiraiton, covered broadly with abx. Patient overall with decline per daughter, and she is opting for focus on comfort, but continuing IV abx and IV fluids for now. Open to hospice, referral to be made. DNR/DNI. No plans for NGT placement. Palliative care c/s for GOC.    INTERVAL EVENTS:  1/24: patient comfortable    ADVANCE DIRECTIVES:    [ ] Full Code [x ] DNR/DNI  MOLST  [ ]  Living Will  [ ]   DECISION MAKER(s):  [x ] Health Care Proxy(s)  [ ] Surrogate(s)  [ ] Guardian           Name(s): Phone Number(s):  Answers: Emergency Contact Name Maritza,  Answers: Emergency Contact Phone # [phone],  Answers: Emergency Contact Relationship daughter    BASELINE (I)ADL(s) (prior to admission):  [place]: [ ]Total  [ ] Moderate [ ]Dependent  Palliative Performance Status Version 2:         %    http://npcrc.org/files/news/palliative_performance_scale_[email or web address].pdf    Allergies    No Known Allergies    Intolerances    MEDICATIONS  (STANDING):  dextrose 5% + sodium chloride 0.45%. 1000 milliLiter(s) (75 mL/Hr) IV Continuous <Continuous>  levothyroxine Injectable 75 MICROGram(s) IV Push at bedtime  potassium chloride  20 mEq/100 mL IVPB 20 milliEquivalent(s) IV Intermittent every 2 hours  valproate sodium  IVPB 250 milliGRAM(s) IV Intermittent every 12 hours    MEDICATIONS  (PRN):  artificial  tears Solution 2 Drop(s) Both EYES every 1 hour PRN Dry Eyes  glycopyrrolate Injectable 0.4 milliGRAM(s) IV Push four times a day PRN Secretions  LORazepam   Injectable 0.5 milliGRAM(s) IV Push every 4 hours PRN Anxiety  metoclopramide Injectable 5 milliGRAM(s) IV Push every 8 hours PRN Nausea and/or Vomiting  morphine  - Injectable 2 milliGRAM(s) IV Push every 2 hours PRN Moderate pain (4-6), Severe pain (7-10), Respiratory rate greater than 22    PRESENT SYMPTOMS: [X ]Unable to obtain due to poor mentation   Source if other than patient:  [ ]Family   [ ]Team     Pain: [ ]yes [ ]no  QOL impact -   Location -                    Aggravating factors -  Quality -  Radiation -  Timing-  Severity (0-10 scale):  Minimal acceptable level (0-10 scale):     CPOT:    https://www.[facility].org/getattachment/[email or web address]/Critical-Care-Pain-Observation-Tool-(CPOT)      PAIN AD Score:     [email or web address] (press ctrl +  left click to view)      Dyspnea:                           [ ]Mild [ ]Moderate [ ]Severe [ ]None  Anxiety:                             [ ]Mild [ ]Moderate [ ]Severe [ ]None  Fatigue:                             [ ]Mild [ ]Moderate [ ]Severe [ ]None  Nausea:                             [ ]Mild [ ]Moderate [ ]Severe [ ]None  Loss of appetite:              [ ]Mild [ ]Moderate [ ]Severe [ ]None  Constipation:                    [ ]Mild [ ]Moderate [ ]Severe [ ]None    Other Symptoms:  [ ]All other review of systems negative     Palliative Performance Status Version 2:         %    http://npcrc.org/files/news/palliative_performance_scale_ppsv2.pdf  PHYSICAL EXAM:  Vital Signs Last 24 Hrs  T(C): 36.6 (24 Jan 2023 05:03), Max: 36.6 (24 Jan 2023 05:03)  T(F): 97.9 (24 Jan 2023 05:03), Max: 97.9 (24 Jan 2023 05:03)  HR: 70 (24 Jan 2023 05:03) (70 - 73)  BP: 132/63 (24 Jan 2023 05:03) (132/63 - 169/77)  BP(mean): --  RR: 18 (24 Jan 2023 05:03) (18 - 18)  SpO2: 97% (24 Jan 2023 05:03) (97% - 97%)        GENERAL:  [ X] No acute distress [ ]Lethargic  [ ]Unarousable  [ ]Verbal  [ ]Non-Verbal [ ]Cachexia    BEHAVIORAL/PSYCH:  [ ]Alert and Oriented x  [ ] Anxiety [ ] Delirium [ ] Agitation [X ] Calm   EYES: [ ] No scleral icterus [ ] Scleral icterus [ X] Closed  ENMT:  [ ]Dry mouth  [ ]No external oral lesions [X] No external ear or nose lesions  CARDIOVASCULAR:  [ ]Regular [ ]Irregular [ ]Tachy [ ]Not Tachy  [ ]Randell [ ] Edema [ ] No edema  PULMONARY:  [ ]Tachypnea  [ ]Audible excessive secretions [ X] No labored breathing [ ] labored breathing  GASTROINTESTINAL: [ ]Soft  [ ]Distended  [X ]Not distended [ ]Non tender [ ]Tender  MUSCULOSKELETAL: [ ]No clubbing [ ] clubbing  [ X] No cyanosis [ ] cyanosis  NEUROLOGIC: [ ]No focal deficits  [ ]Follows commands  [ ]Does not follow commands  [X ]Cognitive impairment  [ ]Dysphagia  [ ]Dysarthria  [ ]Paresis   SKIN: [ ] Jaundiced [ ] Non-jaundiced [ ]Rash [ ]No Rash [ ] Warm [ ] Dry  MISC/LINES: [ ] ET tube [ ] Trach [ ]NGT/OGT [ ]PEG [ ]Davies    CRITICAL CARE:  [ ] Shock Present  [ ]Septic [ ]Cardiogenic [ ]Neurologic [ ]Hypovolemic  [ ]  Vasopressors [ ]  Inotropes   [ ]Respiratory failure present [ ]Mechanical ventilation [ ]Non-invasive ventilatory support [ ]High flow  [ ]Acute  [ ]Chronic [ ]Hypoxic  [ ]Hypercarbic [ ]Other  [ ]Other organ failure     LABS: reviewed by me                                   12.3   8.04  )-----------( 147      ( 24 Jan 2023 06:36 )             37.2     01-24    144  |  108  |  10  ----------------------------<  109<H>  3.3<L>   |  24  |  0.7    Ca    8.2<L>      24 Jan 2023 06:36  Mg     2.0     01-24        RADIOLOGY & ADDITIONAL STUDIES: reviewed by me    EKG: reviewed by me        REFERRALS:   [ ]Chaplaincy  [ ]Hospice  [ ]Child Life  [ ]Social Work  [ ]Case management [ ]Holistic Therapy     Goals of Care Document:

## 2023-01-25 NOTE — DIETITIAN INITIAL EVALUATION ADULT - ADD RECOMMEND
Patient CMO since 1/24 ---currently NPO, but per Palliative care - comfort feeds as tolerated    RD to f/u in 5-7 days or PRN

## 2023-01-25 NOTE — PROGRESS NOTE ADULT - ASSESSMENT
82F with PMH of advanced Parkinson's disease with dementia, hypothyroidism, seizures, HLD here with AMS and fever, from sepsis, c/b electrolyte abnormalities. Unclear etiology, possibly aspiraiton, covered broadly with abx. Patient overall with decline per daughter, and she is opting for focus on comfort, but continuing IV abx and IV fluids for now. Open to hospice, referral to be made. DNR/DNI. No plans for NGT placement. Palliative care c/s for [facility].

## 2023-01-25 NOTE — PROGRESS NOTE ADULT - TIME BILLING
Total time spent to complete patient's bedside assessment, physical examination, review medical chart including labs & imaging, discuss medical plan of care with housestaff was more than 25 minutes
direct pt care and interdisciplinary rounds / discussion with family and all chart and imaging / lab reviwed

## 2023-01-25 NOTE — DIETITIAN INITIAL EVALUATION ADULT - PERTINENT MEDS FT
MEDICATIONS  (STANDING):  levothyroxine Injectable 75 MICROGram(s) IV Push at bedtime  valproate sodium  IVPB 250 milliGRAM(s) IV Intermittent every 12 hours    MEDICATIONS  (PRN):  artificial  tears Solution 2 Drop(s) Both EYES every 1 hour PRN Dry Eyes  glycopyrrolate Injectable 0.4 milliGRAM(s) IV Push four times a day PRN Secretions  LORazepam   Injectable 0.5 milliGRAM(s) IV Push every 4 hours PRN Anxiety  metoclopramide Injectable 5 milliGRAM(s) IV Push every 8 hours PRN Nausea and/or Vomiting  morphine  - Injectable 2 milliGRAM(s) IV Push every 2 hours PRN Moderate pain (4-6), Severe pain (7-10), Respiratory rate greater than 22

## 2023-01-26 ENCOUNTER — TRANSCRIPTION ENCOUNTER (OUTPATIENT)
Age: 83
End: 2023-01-26

## 2023-01-26 PROCEDURE — 99239 HOSP IP/OBS DSCHRG MGMT >30: CPT

## 2023-01-26 RX ORDER — QUETIAPINE FUMARATE 200 MG/1
0.5 TABLET, FILM COATED ORAL
Qty: 0 | Refills: 0 | DISCHARGE

## 2023-01-26 RX ORDER — MORPHINE SULFATE 50 MG/1
2.5 CAPSULE, EXTENDED RELEASE ORAL
Qty: 45 | Refills: 0
Start: 2023-01-26 | End: 2023-01-28

## 2023-01-26 RX ADMIN — Medication 52.5 MILLIGRAM(S): at 05:20

## 2023-01-26 NOTE — DISCHARGE NOTE NURSING/CASE MANAGEMENT/SOCIAL WORK - PATIENT PORTAL LINK FT
You can access the FollowMyHealth Patient Portal offered by [facility] by registering at the following website: [email or web address]. By joining [facility]’s FollowMyHealth portal, you will also be able to view your health information using other applications (apps) compatible with our system.

## 2023-01-26 NOTE — DISCHARGE NOTE NURSING/CASE MANAGEMENT/SOCIAL WORK - NSDCPEFALRISK_GEN_ALL_CORE
For information on Fall & Injury Prevention, visit: [email or web address] OR  [email or web address] OR  https://www.cdc.gov/steadi/patient.html

## 2023-01-26 NOTE — DISCHARGE NOTE PROVIDER - NSDCMRMEDTOKEN_GEN_ALL_CORE_FT
carbidopa-levodopa 25 mg-100 mg oral tablet: 1 tab(s) orally 4 times a day  carbidopa-levodopa 50 mg-200 mg oral tablet, extended release: 1 tab(s) orally once a day (at bedtime)  divalproex sodium 250 mg oral delayed release tablet: 1 tab(s) orally 2 times a day  escitalopram 20 mg oral tablet: 1 tab(s) orally once a day (in the morning)  SEROquel 25 mg oral tablet: 0.5 tab(s) orally once a day (at bedtime)  simvastatin 20 mg oral tablet: 1 tab(s) orally once a day (at bedtime)  Synthroid 88 mcg (0.088 mg) oral tablet: 1 tab(s) orally once a day   carbidopa-levodopa 25 mg-100 mg oral tablet: 1 tab(s) orally 4 times a day  carbidopa-levodopa 50 mg-200 mg oral tablet, extended release: 1 tab(s) orally once a day (at bedtime)  divalproex sodium 250 mg oral delayed release tablet: 1 tab(s) orally 2 times a day  escitalopram 20 mg oral tablet: 1 tab(s) orally once a day (in the morning)  LORazepam 2 mg/mL oral concentrate: 0.5 milliliter(s) orally every 4 hours, As Needed -for agitation - for anxiety MDD:3mL daily  morphine 10 mg/5 mL oral solution: 2.5 milliliter(s) orally every 4 hours, As Needed -for severe pain MDD:15mL daily  simvastatin 20 mg oral tablet: 1 tab(s) orally once a day (at bedtime)  Synthroid 88 mcg (0.088 mg) oral tablet: 1 tab(s) orally once a day

## 2023-01-26 NOTE — DISCHARGE NOTE PROVIDER - HOSPITAL COURSE
Pt is a 82yF w/ PMH of Advanced Parkinson Disease, Parkinson's Dementia, Hypothyroidism, recent diagnosis of Seizure Disorder and dyslipidemia p/w AMS and Fever to the ER.     Pt was evaluated and treated for the following problems:    # Sepsis POA (fever, leukocytosis, tachycardia on admission) - Pt was also septic in early January. Treated empirically with antibiotics, then CMO went into effect.   # Metabolic Encephalopathy (POA) - Treated empirically with antibiotics, then CMO went into effect.  # Advanced Parkinson and Advanced Dementia   # Severe Protein Calorie Malnutrition (Temporal Wasting / cachexia) likely FTT   # Seizure disorder - Treated with valproate, then CMO went into effect    Palliative spoke with daughter on 1/24, decision made to make patient CMO and will go home with hospice tomorrow. Continuing IV morphine, Ativan, Robinul for comfort

## 2023-01-26 NOTE — DISCHARGE NOTE PROVIDER - NSDCCPCAREPLAN_GEN_ALL_CORE_FT
PRINCIPAL DISCHARGE DIAGNOSIS  Diagnosis: Comfort measures only status  Assessment and Plan of Treatment: You have been admitted for the evluation and treatment of sepsis. Your HCP has decided to opt for comfort measures only, where your comfort will be prioritized over curative measures such an antibiotics.      SECONDARY DISCHARGE DIAGNOSES  Diagnosis: AMS (altered mental status)  Assessment and Plan of Treatment:     Diagnosis: Hypernatremia  Assessment and Plan of Treatment:

## 2023-01-26 NOTE — DISCHARGE NOTE NURSING/CASE MANAGEMENT/SOCIAL WORK - NSDCVIVACCINE_GEN_ALL_CORE_FT
influenza, injectable, quadrivalent, preservative free; 12-Oct-2019 11:19; Sheila Costa (RN); [employer]; 3bs44 (Exp. Date: 30-Jun-2020); IntraMuscular; Deltoid Right.; 0.5 milliLiter(s); VIS (VIS Published: 15-Aug-2019, VIS Presented: 12-Oct-2019);   Tdap; 07-Oct-2019 01:19; Rosa Poole (ROSALES); [employer]; [id number] (Exp. Date: 22-Oct-2021); IntraMuscular; Deltoid Right.; 0.5 milliLiter(s); VIS (VIS Published: 09-May-2013, VIS Presented: 07-Oct-2019);

## 2023-01-26 NOTE — DISCHARGE NOTE PROVIDER - ATTENDING DISCHARGE PHYSICAL EXAMINATION:
Patient will be discharged home with home hospice. Sent 3 day supply of ativan and morphine, will get rest at home hospice. Ready for d/c.     PHYSICAL EXAM:  GENERAL: NAD, lying in bed comfortably  HEAD:  Atraumatic, Normocephalic  EYES: EOMI, PERRLA, conjunctiva and sclera clear  ENT: dry mucous membranes  NECK: Supple, No JVD  CHEST/LUNG: Clear to auscultation bilaterally; No rales, rhonchi, wheezing, or rubs. Unlabored respirations  HEART: Regular rate and rhythm; No murmurs, rubs, or gallops  ABDOMEN: Bowel sounds present; Soft, Nontender, Nondistended. No hepatomegaly  EXTREMITIES:  2+ Peripheral Pulses, brisk capillary refill. No clubbing, cyanosis, or edema  NERVOUS SYSTEM:  Alert & Oriented X0, speech unintelligible   MSK: FROM all 4 extremities, full and equal strength  SKIN: No rashes or lesions    Spoke with consultants: Y[x ] N [ ]

## 2023-01-30 NOTE — CDI QUERY NOTE - NSCDIOTHERTXTBX_GEN_ALL_CORE_HH
CLINICAL INDICATORS    1/21 H&P Adult: Fever, Leukocytosis, tachycardic on admission ? meets 3 criteria for sepsis. No focal symptoms for an infectious etiology. There was concern of meningitis on prior admission … will cover empirically for meningitis. Vital Signs: (1/21 0817) Temp 100.5,     1/22 Consult Note Adult-Infectious Disease Attending: Rule out sepsis on admission T100.8 P>90 , Hypertensive with fever. Recently AED dose decreased- suspect fevers related to breakthrough seizures. Doubt meningitis, suspect fevers secondary to seizure … monitor off antimicrobials    1/26 Discharge Note Provider: Sepsis POA (fever, leukocytosis, tachycardia on admission) … treated empirically with antibiotics, then CMO went into effect. … Seizure disorder – treated with valproate, then CMO went into effect    EEG: (1/21) Abnormal due to the presence of: focal slowing as above, generalized slowing as above, interictal activity as above. Consistent with diffuse cerebral electrophysiological dysfunction. Secondary to none specific cause., Consistent with focal electrophysiological dysfunction.  Secondary to structural/metabolic cause., Consistent with potential for partial seizure from right hemisphere.    Labs/Microbiology: (1/20) WBC 11.40, Neut 8.05; (1/20) Blood culture x 2 = no growth; Urine culture = E coli; Respiratory viral panel = not detected; UA = no bacteria; leuk esterase = negative; (1/21) Procal 0.05    Radiology: (1/20) CXR: No evidence of acute cardiopulmonary disease    Antimicrobial orders:   •	1/20 Rocephin 2G IV x 5 doses   •	1/21 Vancomycin 1G IV x 5 doses  •	1/21 Ampicillin 2G IV x 9 doses  •	1/21 Acyclovir 520mg IV x 7 doses     1/21 Medication order: Valproate 250mg IV x 6 days    1/24 Orders: Comfort measures only; antibiotic and antiviral orders were discontinued        Based on your professional judgment and the clinical indicators, please clarify if fever, tachycardia and leukocytosis can be further specified as:    • Fever, tachycardia and leukocytosis were evaluated and determined to be noninfectious SIRS secondary to Seizures    • Fever, tachycardia and leukocytosis were evaluated and determined to be sepsis    • Other (please specify):    • Unable to determine      Thank you,  Bailey DICK, RN, BSN  [phone]

## 2023-02-03 DIAGNOSIS — Z79.899 OTHER LONG TERM (CURRENT) DRUG THERAPY: ICD-10-CM

## 2023-02-03 DIAGNOSIS — E87.6 HYPOKALEMIA: ICD-10-CM

## 2023-02-03 DIAGNOSIS — R65.10 SYSTEMIC INFLAMMATORY RESPONSE SYNDROME (SIRS) OF NON-INFECTIOUS ORIGIN WITHOUT ACUTE ORGAN DYSFUNCTION: ICD-10-CM

## 2023-02-03 DIAGNOSIS — J18.9 PNEUMONIA, UNSPECIFIED ORGANISM: ICD-10-CM

## 2023-02-03 DIAGNOSIS — Z79.890 HORMONE REPLACEMENT THERAPY: ICD-10-CM

## 2023-02-03 DIAGNOSIS — Z51.5 ENCOUNTER FOR PALLIATIVE CARE: ICD-10-CM

## 2023-02-03 DIAGNOSIS — E86.0 DEHYDRATION: ICD-10-CM

## 2023-02-03 DIAGNOSIS — R13.10 DYSPHAGIA, UNSPECIFIED: ICD-10-CM

## 2023-02-03 DIAGNOSIS — R53.81 OTHER MALAISE: ICD-10-CM

## 2023-02-03 DIAGNOSIS — E43 UNSPECIFIED SEVERE PROTEIN-CALORIE MALNUTRITION: ICD-10-CM

## 2023-02-03 DIAGNOSIS — G40.909 EPILEPSY, UNSPECIFIED, NOT INTRACTABLE, WITHOUT STATUS EPILEPTICUS: ICD-10-CM

## 2023-02-03 DIAGNOSIS — Z86.19 PERSONAL HISTORY OF OTHER INFECTIOUS AND PARASITIC DISEASES: ICD-10-CM

## 2023-02-03 DIAGNOSIS — F02.80 DEMENTIA IN OTHER DISEASES CLASSIFIED ELSEWHERE, UNSPECIFIED SEVERITY, WITHOUT BEHAVIORAL DISTURBANCE, PSYCHOTIC DISTURBANCE, MOOD DISTURBANCE, AND ANXIETY: ICD-10-CM

## 2023-02-03 DIAGNOSIS — E78.5 HYPERLIPIDEMIA, UNSPECIFIED: ICD-10-CM

## 2023-02-03 DIAGNOSIS — G93.41 METABOLIC ENCEPHALOPATHY: ICD-10-CM

## 2023-02-03 DIAGNOSIS — E87.0 HYPEROSMOLALITY AND HYPERNATREMIA: ICD-10-CM

## 2023-02-03 DIAGNOSIS — Z66 DO NOT RESUSCITATE: ICD-10-CM

## 2023-02-03 DIAGNOSIS — R64 CACHEXIA: ICD-10-CM

## 2023-02-03 DIAGNOSIS — E03.9 HYPOTHYROIDISM, UNSPECIFIED: ICD-10-CM

## 2023-02-03 DIAGNOSIS — G20 PARKINSON'S DISEASE: ICD-10-CM

## 2023-02-03 DIAGNOSIS — Z20.822 CONTACT WITH AND (SUSPECTED) EXPOSURE TO COVID-19: ICD-10-CM

## 2023-02-19 ENCOUNTER — RX RENEWAL (OUTPATIENT)
Age: 83
End: 2023-02-19

## 2023-02-19 RX ORDER — CARBIDOPA AND LEVODOPA 25; 100 MG/1; MG/1
25-100 TABLET ORAL
Qty: 360 | Refills: 0 | Status: ACTIVE | COMMUNITY
Start: 2021-02-01 | End: 1900-01-01

## 2023-06-05 ENCOUNTER — APPOINTMENT (OUTPATIENT)
Dept: NEUROLOGY | Facility: CLINIC | Age: 83
End: 2023-06-05

## 2023-09-21 NOTE — REASON FOR VISIT
[Hyperlipidemia] : hyperlipidemia [Follow-Up - Clinic] : a clinic follow-up of [Hypertension] : hypertension Adult

## 2023-11-22 NOTE — HISTORY OF PRESENT ILLNESS
[FreeTextEntry1] : Janine is here for the F/U , accompanied by her daughter. She is  describing herself as being bored and blue. not doing anything<watching TV and again watching TV>.\par her sleep pattern is also not great . goes to bed late and wakes up late.\par She has been having UTI . in fact couple of weeks ago was admitted to the hospital for pyelonephritis and now again having infection she is back on antibiotics. The daughter believes she has prolapse and planning to take her to GYN.\par She did not have a fall. occasionally her back  bothers her. After the Laminectomy she did somehow better . now reporting more pain.\par She says she is good with her hands as far as doing things however is more scared of walking or going up and down the steps because of her balance.\par No dizziness. her hearing is at the baseline and not the best.\par Occasionally finds herself to be forgetfulll O-Z Flap Text: Because of the full-thickness nature of the defect and location adjacent to important structure(s), a bilateral rotation flap (O to T) was planned. After prep and anesthesia, arcuate incisions were extended from two opposite side of the wound.  Two flaps were created by undermining in the subcutaneous plane. After hemostasis was obtained, the flaps were advanced/carried over and sutured in a layered fashion.

## 2024-01-12 NOTE — ED PROVIDER NOTE - MDM PATIENT STATEMENT FOR ADDL TREATMENT
[HPV Vaccine] : HPV Vaccine
Patient with one or more new problems requiring additional work-up/treatment.

## 2024-01-31 NOTE — PATIENT PROFILE ADULT - NAME OF PERSON WHO ASSISTED
Quality 226: Preventive Care And Screening: Tobacco Use: Screening And Cessation Intervention: Patient screened for tobacco use and is an ex/non-smoker
Quality 130: Documentation Of Current Medications In The Medical Record: Current Medications Documented
MARTÍNEZ daughter
Quality 431: Preventive Care And Screening: Unhealthy Alcohol Use - Screening: Patient not identified as an unhealthy alcohol user when screened for unhealthy alcohol use using a systematic screening method
Quality 47: Advance Care Plan: Advance Care Planning discussed and documented; advance care plan or surrogate decision maker documented in the medical record.
Detail Level: Detailed

## 2024-05-17 NOTE — PHYSICAL EXAM
Please sign pending order.    [General Appearance - Well Developed] : well developed [General Appearance - Well Nourished] : well nourished [Normal Appearance] : normal appearance [Well Groomed] : well groomed [General Appearance - In No Acute Distress] : no acute distress [Edema] : no peripheral edema [Respiration, Rhythm And Depth] : normal respiratory rhythm and effort [Exaggerated Use Of Accessory Muscles For Inspiration] : no accessory muscle use [Abdomen Soft] : soft [Abdomen Tenderness] : non-tender [Costovertebral Angle Tenderness] : no ~M costovertebral angle tenderness [Urinary Bladder Findings] : the bladder was normal on palpation [Normal Station and Gait] : the gait and station were normal for the patient's age [] : no rash [No Focal Deficits] : no focal deficits [Oriented To Time, Place, And Person] : oriented to person, place, and time [Affect] : the affect was normal [Mood] : the mood was normal [Not Anxious] : not anxious [No Palpable Adenopathy] : no palpable adenopathy [FreeTextEntry1] : atrophic vaginitis, urethral prolapse, [place] tree shaped bladder